# Patient Record
Sex: FEMALE | Race: WHITE
[De-identification: names, ages, dates, MRNs, and addresses within clinical notes are randomized per-mention and may not be internally consistent; named-entity substitution may affect disease eponyms.]

---

## 2018-04-12 ENCOUNTER — HOSPITAL ENCOUNTER (OUTPATIENT)
Age: 43
End: 2018-04-12
Payer: COMMERCIAL

## 2018-04-12 DIAGNOSIS — S93.491D: Primary | ICD-10-CM

## 2018-04-12 DIAGNOSIS — M25.371: ICD-10-CM

## 2018-04-12 PROCEDURE — 73721 MRI JNT OF LWR EXTRE W/O DYE: CPT

## 2018-05-21 ENCOUNTER — HOSPITAL ENCOUNTER (OUTPATIENT)
Age: 43
End: 2018-05-21
Payer: COMMERCIAL

## 2018-05-21 DIAGNOSIS — Z01.818: Primary | ICD-10-CM

## 2018-05-21 PROCEDURE — 93005 ELECTROCARDIOGRAM TRACING: CPT

## 2018-05-23 ENCOUNTER — HOSPITAL ENCOUNTER (OUTPATIENT)
Age: 43
End: 2018-05-23
Payer: COMMERCIAL

## 2018-05-23 DIAGNOSIS — Z01.818: Primary | ICD-10-CM

## 2018-05-23 LAB
ANION GAP: 3 (ref 5–15)
BUN SERPL-MCNC: 16 MG/DL (ref 7–18)
BUN/CREAT RATIO: 20.2 RATIO (ref 10–20)
CALCIUM SERPL-MCNC: 8.3 MG/DL (ref 8.5–10.1)
CARBON DIOXIDE: 30 MMOL/L (ref 21–32)
CHLORIDE: 106 MMOL/L (ref 98–107)
EST GLOM FILT RATE - AFR AMER: 103 ML/MIN (ref 60–?)
GLUCOSE: 85 MG/DL (ref 74–106)
POTASSIUM: 4 MMOL/L (ref 3.5–5.1)

## 2018-05-23 PROCEDURE — 80048 BASIC METABOLIC PNL TOTAL CA: CPT

## 2018-05-23 PROCEDURE — 36415 COLL VENOUS BLD VENIPUNCTURE: CPT

## 2018-09-20 ENCOUNTER — HOSPITAL ENCOUNTER (OUTPATIENT)
Age: 43
End: 2018-09-20
Payer: COMMERCIAL

## 2018-09-20 DIAGNOSIS — Z12.4: Primary | ICD-10-CM

## 2018-09-20 PROCEDURE — G0145 SCR C/V CYTO,THINLAYER,RESCR: HCPCS

## 2018-09-20 PROCEDURE — 88175 CYTOPATH C/V AUTO FLUID REDO: CPT

## 2018-09-24 ENCOUNTER — HOSPITAL ENCOUNTER (OUTPATIENT)
Age: 43
End: 2018-09-24
Payer: COMMERCIAL

## 2018-09-24 DIAGNOSIS — Z12.31: Primary | ICD-10-CM

## 2018-09-24 PROCEDURE — 77067 SCR MAMMO BI INCL CAD: CPT

## 2018-09-24 PROCEDURE — 77063 BREAST TOMOSYNTHESIS BI: CPT

## 2022-12-08 ENCOUNTER — HOSPITAL ENCOUNTER (OUTPATIENT)
Dept: HOSPITAL 100 - LABSPEC | Age: 47
Discharge: HOME | End: 2022-12-08
Payer: COMMERCIAL

## 2022-12-08 DIAGNOSIS — L05.01: Primary | ICD-10-CM

## 2022-12-08 PROCEDURE — 87070 CULTURE OTHR SPECIMN AEROBIC: CPT

## 2022-12-08 PROCEDURE — 87075 CULTR BACTERIA EXCEPT BLOOD: CPT

## 2022-12-08 PROCEDURE — 87205 SMEAR GRAM STAIN: CPT

## 2022-12-08 PROCEDURE — 87077 CULTURE AEROBIC IDENTIFY: CPT

## 2022-12-27 ENCOUNTER — HOSPITAL ENCOUNTER (OUTPATIENT)
Dept: HOSPITAL 100 - EN | Age: 47
Discharge: HOME | End: 2022-12-27
Payer: COMMERCIAL

## 2022-12-27 VITALS — BODY MASS INDEX: 30.3 KG/M2

## 2022-12-27 VITALS
SYSTOLIC BLOOD PRESSURE: 121 MMHG | OXYGEN SATURATION: 100 % | DIASTOLIC BLOOD PRESSURE: 76 MMHG | RESPIRATION RATE: 16 BRPM | HEART RATE: 67 BPM

## 2022-12-27 VITALS
HEART RATE: 71 BPM | DIASTOLIC BLOOD PRESSURE: 80 MMHG | RESPIRATION RATE: 16 BRPM | SYSTOLIC BLOOD PRESSURE: 121 MMHG | TEMPERATURE: 97.8 F | OXYGEN SATURATION: 100 %

## 2022-12-27 VITALS
DIASTOLIC BLOOD PRESSURE: 80 MMHG | OXYGEN SATURATION: 100 % | TEMPERATURE: 97.8 F | SYSTOLIC BLOOD PRESSURE: 121 MMHG | RESPIRATION RATE: 16 BRPM | HEART RATE: 62 BPM

## 2022-12-27 VITALS — SYSTOLIC BLOOD PRESSURE: 121 MMHG | DIASTOLIC BLOOD PRESSURE: 80 MMHG

## 2022-12-27 VITALS
RESPIRATION RATE: 16 BRPM | SYSTOLIC BLOOD PRESSURE: 104 MMHG | DIASTOLIC BLOOD PRESSURE: 67 MMHG | HEART RATE: 65 BPM | OXYGEN SATURATION: 99 %

## 2022-12-27 VITALS
DIASTOLIC BLOOD PRESSURE: 80 MMHG | RESPIRATION RATE: 16 BRPM | SYSTOLIC BLOOD PRESSURE: 121 MMHG | OXYGEN SATURATION: 100 % | TEMPERATURE: 98.3 F | HEART RATE: 69 BPM

## 2022-12-27 DIAGNOSIS — Z12.11: Primary | ICD-10-CM

## 2022-12-27 DIAGNOSIS — K63.89: ICD-10-CM

## 2022-12-27 LAB — INTERNAL QC VALIDATED?: (no result)

## 2022-12-27 PROCEDURE — 88305 TISSUE EXAM BY PATHOLOGIST: CPT

## 2022-12-27 PROCEDURE — 0DJD8ZZ INSPECTION OF LOWER INTESTINAL TRACT, VIA NATURAL OR ARTIFICIAL OPENING ENDOSCOPIC: ICD-10-PCS | Performed by: SURGERY

## 2022-12-27 PROCEDURE — 45380 COLONOSCOPY AND BIOPSY: CPT

## 2022-12-27 PROCEDURE — 81025 URINE PREGNANCY TEST: CPT

## 2022-12-27 RX ADMIN — SILVER NITRATE 1 EACH: 38.21; 12.74 STICK TOPICAL at 07:40

## 2023-01-10 ENCOUNTER — APPOINTMENT (OUTPATIENT)
Dept: URBAN - METROPOLITAN AREA CLINIC 204 | Age: 48
Setting detail: DERMATOLOGY
End: 2023-01-10

## 2023-01-10 DIAGNOSIS — L57.8 OTHER SKIN CHANGES DUE TO CHRONIC EXPOSURE TO NONIONIZING RADIATION: ICD-10-CM

## 2023-01-10 DIAGNOSIS — D18.0 HEMANGIOMA: ICD-10-CM

## 2023-01-10 DIAGNOSIS — D22 MELANOCYTIC NEVI: ICD-10-CM

## 2023-01-10 DIAGNOSIS — L21.8 OTHER SEBORRHEIC DERMATITIS: ICD-10-CM

## 2023-01-10 DIAGNOSIS — L82.1 OTHER SEBORRHEIC KERATOSIS: ICD-10-CM

## 2023-01-10 PROBLEM — D22.71 MELANOCYTIC NEVI OF RIGHT LOWER LIMB, INCLUDING HIP: Status: ACTIVE | Noted: 2023-01-10

## 2023-01-10 PROBLEM — D22.62 MELANOCYTIC NEVI OF LEFT UPPER LIMB, INCLUDING SHOULDER: Status: ACTIVE | Noted: 2023-01-10

## 2023-01-10 PROBLEM — D22.72 MELANOCYTIC NEVI OF LEFT LOWER LIMB, INCLUDING HIP: Status: ACTIVE | Noted: 2023-01-10

## 2023-01-10 PROBLEM — D18.01 HEMANGIOMA OF SKIN AND SUBCUTANEOUS TISSUE: Status: ACTIVE | Noted: 2023-01-10

## 2023-01-10 PROBLEM — D22.61 MELANOCYTIC NEVI OF RIGHT UPPER LIMB, INCLUDING SHOULDER: Status: ACTIVE | Noted: 2023-01-10

## 2023-01-10 PROBLEM — D22.39 MELANOCYTIC NEVI OF OTHER PARTS OF FACE: Status: ACTIVE | Noted: 2023-01-10

## 2023-01-10 PROBLEM — D22.5 MELANOCYTIC NEVI OF TRUNK: Status: ACTIVE | Noted: 2023-01-10

## 2023-01-10 PROCEDURE — 99203 OFFICE O/P NEW LOW 30 MIN: CPT

## 2023-01-10 PROCEDURE — OTHER PRESCRIPTION MEDICATION MANAGEMENT: OTHER

## 2023-01-10 PROCEDURE — OTHER PRESCRIPTION: OTHER

## 2023-01-10 PROCEDURE — OTHER COUNSELING: OTHER

## 2023-01-10 PROCEDURE — OTHER MIPS QUALITY: OTHER

## 2023-01-10 RX ORDER — KETOCONAZOLE 20 MG/ML
SHAMPOO, SUSPENSION TOPICAL TIW
Qty: 120 | Refills: 3 | Status: ERX | COMMUNITY
Start: 2023-01-10

## 2023-01-10 ASSESSMENT — LOCATION DETAILED DESCRIPTION DERM
LOCATION DETAILED: LEFT LATERAL EYEBROW
LOCATION DETAILED: RIGHT LATERAL EYEBROW
LOCATION DETAILED: INFERIOR THORACIC SPINE
LOCATION DETAILED: LEFT DISTAL POSTERIOR UPPER ARM
LOCATION DETAILED: SUPERIOR LUMBAR SPINE
LOCATION DETAILED: RIGHT MEDIAL FRONTAL SCALP
LOCATION DETAILED: EPIGASTRIC SKIN
LOCATION DETAILED: RIGHT DISTAL POSTERIOR UPPER ARM
LOCATION DETAILED: RIGHT ANTERIOR DISTAL THIGH
LOCATION DETAILED: LEFT RIB CAGE
LOCATION DETAILED: RIGHT LATERAL BREAST 7-8:00 REGION
LOCATION DETAILED: LEFT ANTERIOR PROXIMAL THIGH
LOCATION DETAILED: RIGHT SUPERIOR UPPER BACK

## 2023-01-10 ASSESSMENT — LOCATION SIMPLE DESCRIPTION DERM
LOCATION SIMPLE: LEFT EYEBROW
LOCATION SIMPLE: RIGHT THIGH
LOCATION SIMPLE: RIGHT UPPER BACK
LOCATION SIMPLE: RIGHT BREAST
LOCATION SIMPLE: RIGHT POSTERIOR UPPER ARM
LOCATION SIMPLE: UPPER BACK
LOCATION SIMPLE: LEFT THIGH
LOCATION SIMPLE: ABDOMEN
LOCATION SIMPLE: RIGHT SCALP
LOCATION SIMPLE: LEFT POSTERIOR UPPER ARM
LOCATION SIMPLE: LOWER BACK
LOCATION SIMPLE: RIGHT EYEBROW

## 2023-01-10 ASSESSMENT — LOCATION ZONE DERM
LOCATION ZONE: FACE
LOCATION ZONE: TRUNK
LOCATION ZONE: ARM
LOCATION ZONE: SCALP
LOCATION ZONE: LEG

## 2023-01-10 NOTE — PROCEDURE: PRESCRIPTION MEDICATION MANAGEMENT
Plan: Will f/u in 6 weeks. If doing well pt can cancel appt. Can taper down from there to prn
Render In Strict Bullet Format?: No
Detail Level: Zone
Initiate Treatment: ketoconazole 2 % shampoo TIW\\nQuantity: 120.0 ml  Days Supply: 30\\nSig: Shampoo 2-3 times weekly.

## 2023-01-10 NOTE — PROCEDURE: COUNSELING
Detail Level: Generalized
Detail Level: Simple
Detail Level: Zone
Detail Level: Detailed
Shampoo Recommendations: Alternate T/Gel with T/Sal to help soothe the scalp and gently lift the scale away.

## 2024-05-09 ENCOUNTER — HOSPITAL ENCOUNTER (OUTPATIENT)
Dept: HOSPITAL 100 - MFPLAB | Age: 49
Discharge: HOME | End: 2024-05-09
Payer: COMMERCIAL

## 2024-05-09 DIAGNOSIS — Z00.00: Primary | ICD-10-CM

## 2024-05-09 DIAGNOSIS — Z13.29: ICD-10-CM

## 2024-05-09 DIAGNOSIS — Z13.220: ICD-10-CM

## 2024-05-09 DIAGNOSIS — Z13.21: ICD-10-CM

## 2024-05-09 DIAGNOSIS — R53.83: ICD-10-CM

## 2024-05-09 DIAGNOSIS — Z13.1: ICD-10-CM

## 2024-05-09 LAB
ALANINE AMINOTRANSFER ALT/SGPT: 34 U/L (ref 13–56)
ALBUMIN SERPL-MCNC: 3.9 G/DL (ref 3.2–5)
ALKALINE PHOSPHATASE: 96 U/L (ref 45–117)
ANION GAP: 9 (ref 5–15)
AST(SGOT): 19 U/L (ref 15–37)
BUN SERPL-MCNC: 15 MG/DL (ref 7–18)
BUN/CREAT RATIO: 18.8 RATIO (ref 10–20)
CALCIUM SERPL-MCNC: 8.9 MG/DL (ref 8.5–10.1)
CARBON DIOXIDE: 23 MMOL/L (ref 21–32)
CHLORIDE: 107 MMOL/L (ref 98–107)
CHOLEST SERPL-MCNC: 271 MG/DL
CRP SERPL-MCNC: 5.66 MG/L (ref 0–3)
DEPRECATED RDW RBC: 41.2 FL (ref 35.1–43.9)
ERYTHROCYTE [DISTWIDTH] IN BLOOD: 11.9 % (ref 11.6–14.6)
EST GLOM FILT RATE - AFR AMER: 99 ML/MIN (ref 60–?)
FREE T3: 2.9 PG/ML (ref 2.18–3.98)
GLOBULIN: 3.5 G/DL (ref 2.2–4.2)
GLUCOSE: 87 MG/DL (ref 74–106)
HCT VFR BLD AUTO: 43.5 % (ref 37–47)
HGB BLD-MCNC: 14.5 G/DL (ref 12–15)
MCV RBC: 93.5 FL (ref 81–99)
MEAN CORP HGB CONC: 33.3 G/DL (ref 32–36)
MEAN PLATELET VOL.: 10.4 FL (ref 6.2–12)
PLATELET # BLD: 232 K/MM3 (ref 150–450)
POTASSIUM: 4.3 MMOL/L (ref 3.5–5.1)
RBC # BLD AUTO: 4.65 M/MM3 (ref 4.2–5.4)
TRIGLYCERIDES: 138 MG/DL
VITAMIN D,25 HYDROXY: 48.8 NG/ML
VLDLC SERPL-MCNC: 28 MG/DL (ref 5–40)
WBC # BLD AUTO: 4.3 K/MM3 (ref 4.4–11)

## 2024-05-09 PROCEDURE — 84439 ASSAY OF FREE THYROXINE: CPT

## 2024-05-09 PROCEDURE — 36415 COLL VENOUS BLD VENIPUNCTURE: CPT

## 2024-05-09 PROCEDURE — 80061 LIPID PANEL: CPT

## 2024-05-09 PROCEDURE — 86140 C-REACTIVE PROTEIN: CPT

## 2024-05-09 PROCEDURE — 80053 COMPREHEN METABOLIC PANEL: CPT

## 2024-05-09 PROCEDURE — 85027 COMPLETE CBC AUTOMATED: CPT

## 2024-05-09 PROCEDURE — 84481 FREE ASSAY (FT-3): CPT

## 2024-05-09 PROCEDURE — 83090 ASSAY OF HOMOCYSTEINE: CPT

## 2024-05-09 PROCEDURE — 82306 VITAMIN D 25 HYDROXY: CPT

## 2024-05-09 PROCEDURE — 83525 ASSAY OF INSULIN: CPT

## 2024-05-09 PROCEDURE — 84443 ASSAY THYROID STIM HORMONE: CPT

## 2024-05-11 LAB — HOMOCYSTEINE: 10.1 UMOL/L (ref 0–14.5)

## 2024-05-17 ENCOUNTER — HOSPITAL ENCOUNTER (OUTPATIENT)
Dept: HOSPITAL 100 - SDC | Age: 49
Discharge: HOME | End: 2024-05-17
Payer: COMMERCIAL

## 2024-05-17 VITALS
SYSTOLIC BLOOD PRESSURE: 111 MMHG | DIASTOLIC BLOOD PRESSURE: 78 MMHG | RESPIRATION RATE: 16 BRPM | HEART RATE: 68 BPM | TEMPERATURE: 97.1 F | OXYGEN SATURATION: 95 %

## 2024-05-17 VITALS
SYSTOLIC BLOOD PRESSURE: 122 MMHG | OXYGEN SATURATION: 99 % | RESPIRATION RATE: 16 BRPM | HEART RATE: 57 BPM | DIASTOLIC BLOOD PRESSURE: 77 MMHG | TEMPERATURE: 97.34 F

## 2024-05-17 VITALS
DIASTOLIC BLOOD PRESSURE: 78 MMHG | RESPIRATION RATE: 16 BRPM | HEART RATE: 78 BPM | OXYGEN SATURATION: 95 % | SYSTOLIC BLOOD PRESSURE: 123 MMHG

## 2024-05-17 VITALS
DIASTOLIC BLOOD PRESSURE: 78 MMHG | SYSTOLIC BLOOD PRESSURE: 108 MMHG | RESPIRATION RATE: 16 BRPM | OXYGEN SATURATION: 98 % | HEART RATE: 54 BPM

## 2024-05-17 VITALS
OXYGEN SATURATION: 97 % | HEART RATE: 49 BPM | DIASTOLIC BLOOD PRESSURE: 78 MMHG | RESPIRATION RATE: 16 BRPM | TEMPERATURE: 96.98 F | SYSTOLIC BLOOD PRESSURE: 123 MMHG

## 2024-05-17 VITALS
DIASTOLIC BLOOD PRESSURE: 78 MMHG | SYSTOLIC BLOOD PRESSURE: 123 MMHG | OXYGEN SATURATION: 100 % | TEMPERATURE: 97.6 F | RESPIRATION RATE: 18 BRPM | HEART RATE: 65 BPM

## 2024-05-17 VITALS — BODY MASS INDEX: 34 KG/M2

## 2024-05-17 DIAGNOSIS — K40.20: Primary | ICD-10-CM

## 2024-05-17 DIAGNOSIS — Z90.49: ICD-10-CM

## 2024-05-17 LAB
INTERNAL QC VALIDATED?: (no result)
RECORD KIT LOT#,URINE PREG: (no result)

## 2024-05-17 PROCEDURE — C1781 MESH (IMPLANTABLE): HCPCS

## 2024-05-17 PROCEDURE — 81025 URINE PREGNANCY TEST: CPT

## 2024-05-17 PROCEDURE — 49505 PRP I/HERN INIT REDUC >5 YR: CPT

## 2024-05-17 PROCEDURE — 00830 ANES HERNIA RPR LWR ABD NOS: CPT

## 2024-05-17 RX ADMIN — HYDROCODONE BITARTRATE AND ACETAMINOPHEN 0 TABLET: 5; 325 TABLET ORAL at 10:19

## 2024-05-17 RX ADMIN — CEFAZOLIN 150 GM: 10 INJECTION, POWDER, FOR SOLUTION INTRAVENOUS at 07:22

## 2024-08-19 ENCOUNTER — HOSPITAL ENCOUNTER (OUTPATIENT)
Dept: RADIOLOGY | Facility: HOSPITAL | Age: 49
Discharge: HOME | End: 2024-08-19
Payer: COMMERCIAL

## 2024-08-19 DIAGNOSIS — Z13.21 ENCOUNTER FOR SCREENING FOR NUTRITIONAL DISORDER: ICD-10-CM

## 2024-08-19 PROCEDURE — 75571 CT HRT W/O DYE W/CA TEST: CPT

## 2024-11-19 ENCOUNTER — HOSPITAL ENCOUNTER (OUTPATIENT)
Dept: HOSPITAL 100 - LAB | Age: 49
Discharge: HOME | End: 2024-11-19
Payer: COMMERCIAL

## 2024-11-19 DIAGNOSIS — E78.5: ICD-10-CM

## 2024-11-19 DIAGNOSIS — E55.9: ICD-10-CM

## 2024-11-19 DIAGNOSIS — E61.1: ICD-10-CM

## 2024-11-19 DIAGNOSIS — R63.5: Primary | ICD-10-CM

## 2024-11-19 DIAGNOSIS — M25.9: ICD-10-CM

## 2024-11-19 LAB
ALANINE AMINOTRANSFER ALT/SGPT: 37 U/L (ref 13–56)
ALBUMIN SERPL-MCNC: 3.7 G/DL (ref 3.2–5)
ALKALINE PHOSPHATASE: 127 U/L (ref 45–117)
ANION GAP: 7 (ref 5–15)
AST(SGOT): 15 U/L (ref 15–37)
BUN SERPL-MCNC: 18 MG/DL (ref 7–18)
BUN/CREAT RATIO: 22.3 RATIO (ref 10–20)
CALCIUM SERPL-MCNC: 8.9 MG/DL (ref 8.5–10.1)
CARBON DIOXIDE: 26 MMOL/L (ref 21–32)
CHLORIDE: 102 MMOL/L (ref 98–107)
CHOLEST SERPL-MCNC: 245 MG/DL
DEPRECATED RDW RBC: 40.4 FL (ref 35.1–43.9)
ERYTHROCYTE [DISTWIDTH] IN BLOOD: 12.3 % (ref 11.6–14.6)
EST GLOM FILT RATE - AFR AMER: 98 ML/MIN (ref 60–?)
FERRITIN SERPL-MCNC: 51 NG/ML (ref 8–252)
FOLATES, (FOLIC ACID): 7.3 NG/ML (ref 3.1–55.4)
GLOBULIN: 3.5 G/DL (ref 2.2–4.2)
GLUCOSE: 92 MG/DL (ref 74–106)
HCT VFR BLD AUTO: 42.7 % (ref 37–47)
HEMOGLOBIN: 14.6 G/DL (ref 12–15)
HGB BLD-MCNC: 14.6 G/DL (ref 12–15)
IRON BINDING CAPACITY,TOTAL: 381 UG/DL (ref 250–450)
IRON SATN MFR SERPL: 32.8 % (ref 15–55)
IRON SPEC-MCNT: 125 UG/DL (ref 50–170)
LIPASE: 24 U/L (ref 13–75)
MCV RBC: 91 FL (ref 81–99)
MEAN CORP HGB CONC: 34.2 G/DL (ref 32–36)
MEAN PLATELET VOL.: 9.5 FL (ref 6.2–12)
PLATELET # BLD: 271 K/MM3 (ref 150–450)
PLATELET COUNT: 271 K/MM3 (ref 150–450)
POTASSIUM: 4.5 MMOL/L (ref 3.5–5.1)
RBC # BLD AUTO: 4.69 M/MM3 (ref 4.2–5.4)
RBC DISTRIBUTION WIDTH CV: 12.3 % (ref 11.6–14.6)
RBC DISTRIBUTION WIDTH SD: 40.4 FL (ref 35.1–43.9)
TRIGLYCERIDES: 184 MG/DL
VITAMIN B12: 536 PG/ML (ref 211–911)
VITAMIN D,25 HYDROXY: 20.8 NG/ML
VLDLC SERPL-MCNC: 37 MG/DL (ref 5–40)
WBC # BLD AUTO: 6.7 K/MM3 (ref 4.4–11)
WHITE BLOOD COUNT: 6.7 K/MM3 (ref 4.4–11)

## 2024-11-19 PROCEDURE — 82728 ASSAY OF FERRITIN: CPT

## 2024-11-19 PROCEDURE — 83525 ASSAY OF INSULIN: CPT

## 2024-11-19 PROCEDURE — 84443 ASSAY THYROID STIM HORMONE: CPT

## 2024-11-19 PROCEDURE — 83690 ASSAY OF LIPASE: CPT

## 2024-11-19 PROCEDURE — 36415 COLL VENOUS BLD VENIPUNCTURE: CPT

## 2024-11-19 PROCEDURE — 82306 VITAMIN D 25 HYDROXY: CPT

## 2024-11-19 PROCEDURE — 80061 LIPID PANEL: CPT

## 2024-11-19 PROCEDURE — 85027 COMPLETE CBC AUTOMATED: CPT

## 2024-11-19 PROCEDURE — 80053 COMPREHEN METABOLIC PANEL: CPT

## 2024-11-19 PROCEDURE — 83540 ASSAY OF IRON: CPT

## 2024-11-19 PROCEDURE — 82746 ASSAY OF FOLIC ACID SERUM: CPT

## 2024-11-19 PROCEDURE — 82607 VITAMIN B-12: CPT

## 2024-11-19 PROCEDURE — 83036 HEMOGLOBIN GLYCOSYLATED A1C: CPT

## 2024-11-19 PROCEDURE — 83550 IRON BINDING TEST: CPT

## 2024-11-19 PROCEDURE — 84439 ASSAY OF FREE THYROXINE: CPT

## 2025-01-21 ENCOUNTER — HOSPITAL ENCOUNTER
Dept: HOSPITAL 101 - RAD | Age: 50
Discharge: HOME | End: 2025-01-21
Payer: SELF-PAY

## 2025-01-21 DIAGNOSIS — M77.32: ICD-10-CM

## 2025-01-21 DIAGNOSIS — M25.572: Primary | ICD-10-CM

## 2025-01-21 PROCEDURE — 73630 X-RAY EXAM OF FOOT: CPT

## 2025-01-21 PROCEDURE — 73610 X-RAY EXAM OF ANKLE: CPT

## 2025-01-21 NOTE — XR_ITS
[address] [address] 
     [phone] 
  
  
Patient Name: 
MAGGY CARPIO 
  
MRN: [id number]    YOB: 1975    Sex: F 
Assigned Patient Location: [facility] 
Current Patient Location:   
Accession/Order Number: [id number] 
Exam Date: 1/21/2025  15:38    Report Date: 1/22/2025  23:09 
  
At the request of: 
ANH MCLOED   
  
Procedure:  XR foot LT min 3V 
  
EXAM: XR foot LT min 3V  
  
HISTORY: Left Foot And Ankle Pain 
  
COMPARISON: None.  
  
FINDINGS/IMPRESSION:  
  
1. No acute fracture or dislocation. 
2. Mild degeneration of the interphalangeal joints. 
3. Small calcaneal Achilles and plantar enthesophytes. 
4. No ankle joint effusion. 
  
  
Electronically authenticated by: CALI CHOW   Date: 1/22/2025  23:09

## 2025-01-21 NOTE — XR_ITS
The [address] [address] 
     [phone] 
  
  
Patient Name: 
MAGGY CARPIO 
  
MRN: [id number]    YOB: 1975    Sex: F 
Assigned Patient Location: [facility] 
Current Patient Location:   
Accession/Order Number: [id number] 
Exam Date: 1/21/2025  15:38    Report Date: 1/22/2025  23:09 
  
At the request of: 
ANH MCLEOD   
  
Procedure:  XR ankle LT min 3V 
  
EXAM: XR ankle LT min 3V  
  
HISTORY: Left Foot And Ankle Pain 
  
COMPARISON: None.  
  
FINDINGS/IMPRESSION:  
  
1. No acute fracture or dislocation. 
2. Ankle mortise is maintained. 
3. Mild degeneration of the mid foot. 
4. No ankle joint effusion. 
5. Small calcaneal Achilles and plantar enthesophytes. 
  
  
Electronically authenticated by: CALI CHOW   Date: 1/22/2025  23:09

## 2025-02-03 ENCOUNTER — HOSPITAL ENCOUNTER
Dept: HOSPITAL 101 - PST | Age: 50
Discharge: HOME | End: 2025-02-03
Payer: COMMERCIAL

## 2025-02-03 VITALS
OXYGEN SATURATION: 96 % | TEMPERATURE: 97.1 F | SYSTOLIC BLOOD PRESSURE: 137 MMHG | HEART RATE: 76 BPM | DIASTOLIC BLOOD PRESSURE: 91 MMHG

## 2025-02-03 DIAGNOSIS — Z01.810: Primary | ICD-10-CM

## 2025-02-03 DIAGNOSIS — M72.2: ICD-10-CM

## 2025-02-03 DIAGNOSIS — Z01.818: ICD-10-CM

## 2025-02-03 PROCEDURE — G0463 HOSPITAL OUTPT CLINIC VISIT: HCPCS

## 2025-02-03 PROCEDURE — 93005 ELECTROCARDIOGRAM TRACING: CPT

## 2025-02-03 NOTE — XMS_ITS
Comprehensive CCD (C-CDA v2.1)  
  
                          Created on: February 3, 2025  
  
  
MAGGY CARPIO  
External Reference #: CDR,PersonID:[id number]  
: 1975  
Sex: Female  
  
Demographics  
  
  
                                        Address             [address]  [address]  
   
                                        Home Phone          [phone]  
   
                                        Work Phone          [phone]  
   
                                        Home Phone          [phone]  
   
                                        Work Phone          [phone]  
   
                                        Home Phone          [phone]  
   
                                        Preferred Language  en  
   
                                        Marital Status        
   
                                        Holiness Affiliation Unknown  
   
                                        Race                White  
   
                                        Ethnic Group        Not  or Lati  
no  
  
  
Author  
  
  
                                        Organization        [facility]  
  
  
Care Team Providers  
  
  
                                Care Team Member Name Role            Phone  
   
                                Jb Apodaca Unavailable     [phone]  
   
                                Rachelle Alexander N  Unavailable     [phone]  
   
                                Alexander, Rachelle N  Unavailable     [phone]  
   
                                Alexander Rachelle N  Unavailable     [phone]  
   
                                Alexander Rachelle N  Unavailable     [phone]  
   
                                Alexander Rachelle N  Unavailable     [phone]  
   
                                Alexander, Rachelle N  Unavailable     [phone]  
   
                                ANH MCLEOD Admitting       Unavailable  
   
                                ANH MCLEOD Attending       Unavailable  
   
                                DR SARWAT CARRILLO Consulting      Unavailable  
   
                                ANH MCLEOD Consulting      Unavailable  
   
                                Jose Sloan MD Primary Care Provider 1  
[phone]  
   
                                Jose Sloan MD Primary Care Provider 1  
[phone]  
   
                                Dr. Krystian Sloan Primary Care Provider [phone]  
[phone]  
   
                                Carolina Sunshine     Attending Provider Unavailable  
   
                                Dr. Krystian Sloan Referring Provider [phone]  
[phone]  
   
                                Dr. Faye Valero Attending Provider [phone]  
   
                                Yusra Maria Attending Provider Unavailable  
   
                                Dr. Faye Valero Other Provider  [phone]  
   
                                JOSE SLOAN MD Consulting      Unavailab  
le  
   
                                JANIE LAUREN-URGENT/OCC MED Primary Care    Unav  
ailable  
   
                                JANIE, LAUREN-URGENT/OCC MED Attending       Unav  
ailable  
   
                                JANIE LAUREN-URGENT/OCC MED Admitting       Unav  
ailable  
   
                                PROVIDER, UNKNOWN Consulting      Unavailable  
   
                                JOSE SLOAN MD Consulting      Unavailab  
SHANI Waldrop MD Primary Care    Unavailable  
   
                                SHANI SANCHEZ MD Attending       Unavailable  
   
                                SHANI SANCHEZ MD Admitting       Unavailable  
   
                                PROVIDER, UNKNOWN Consulting      Unavailable  
   
                                JOSE SLOAN MD Consulting      Unavailab  
le  
   
                                POMPEY, KAYLYN PAC Primary Care    Unavailable  
   
                                POMPEY, KAYLYN PAC Attending       Unavailable  
   
                                POMPEY, KAYLYN PAC Admitting       Unavailable  
   
                                PROVIDER, UNKNOWN Consulting      Unavailable  
   
                                JOSE SLOAN MD Consulting      Unavailab  
PRABHU Urbano DR Primary Care    Unavailable  
   
                                PRABHU SCHMIDT DR Attending       Unavailable  
   
                                PRABHU SCHMIDT DR Admitting       Unavailable  
   
                                PROVIDER, UNKNOWN Consulting      Unavailable  
   
                                JOSE SLOAN MD Consulting      Unavailab  
PRABHU [address] Primary Care    Unavailable  
   
                                PRABHU SCHMIDT DR Attending       Unavailable  
   
                                PRABHU SCHMIDT DR Admitting       Unavailable  
   
                                PROVIDER, UNKNOWN Consulting      Unavailable  
   
                                JOSE SLOAN MD Consulting      Unavailab  
PRABHU [address] Primary Care    Unavailable  
   
                                PRABHU SCHMIDT DR Attending       Unavailable  
   
                                PRABHU SCHMIDT DR Admitting       Unavailable  
   
                                PROVIDER, UNKNOWN Consulting      Unavailable  
   
                                JOSE SLOAN MD Consulting      Unavailab  
le  
   
                                POMPEY, KAYLYN PAC Primary Care    Unavailable  
   
                                POMPEY, KAYLYN PAC Attending       Unavailable  
   
                                POMPEY, KAYLYN PAC Admitting       Unavailable  
   
                                PROVIDER, UNKNOWN Consulting      Unavailable  
   
                                JOSE SLOAN MD Consulting      Unavailab  
SHANI Waldrop MD Primary Care    Unavailable  
   
                                SHANI SANCHEZ MD Attending       Unavailable  
   
                                SHANI SANCHEZ MD Admitting       Unavailable  
   
                                PROVIDER, UNKNOWN Consulting      Unavailable  
   
                                JOSE SLOAN MD Consulting      Unavailab  
SHANI Waldrop MD Primary Care    Unavailable  
   
                                JOSE SLOAN MD Referring       Unavailab  
SHANI Waldrop MD Attending       Unavailable  
   
                                SHANI SANCHEZ MD Admitting       Unavailable  
   
                                PROVIDER, UNKNOWN Consulting      Unavailable  
   
                                JOSE SLOAN MD Consulting      Unavailab  
SHANI Waldrop MD Primary Care    Unavailable  
   
                                SHANI SANCHEZ MD Attending       Unavailable  
   
                                SHANI SANCHEZ MD Admitting       Unavailable  
   
                                PROVIDER, UNKNOWN Consulting      Unavailable  
   
                                JOSE SLOAN MD Consulting      Unavailab  
SHANI Waldrop MD Primary Care    Unavailable  
   
                                SHANI SANCHEZ MD Attending       Unavailable  
   
                                SHANI SANCHEZ MD Admitting       Unavailable  
   
                                PROVIDER, UNKNOWN Consulting      Unavailable  
   
                                MARIYA Lopes Attending Provider [phone]  
[phone]  
   
                                Dr. Krystian Sloan Primary Care Provider [phone]  
[phone]  
   
                                Dr. Krystian Sloan Referring Provider [phone]  
   
                                Dr. Faye Valero Attending Provider [phone]  
   
                                Yusra Maria Attending Provider Unavailable  
   
                                Dr. Faye Valero Other Provider  [phone]  
[phone]  
   
                                MARIYA Lopes Attending Provider [phone]  
[phone]  
   
                                MARIYA Lopes Referring Provider [phone]  
[phone]  
   
                                Jose Sloan MD Primary Care Provider 1  
[phone]  
   
                                Dr. Krystian Sloan Primary Care Provider [phone]  
[phone]  
   
                                Dr. Krystian Sloan Referring Provider [phone]  
[phone]  
   
                                Dr. Faye Valero Attending Provider [phone]  
[phone]  
   
                                Dr. Krystian Sloan Primary Care Provider [phone]  
[phone]  
   
                                Dr. Krystian Sloan Referring Provider [phone]  
[phone]  
   
                                Dr. Faye Valero Attending Provider [phone]  
   
                                SHANI HOUSE Referring       Unavail  
able  
   
                                JOSE SLOAN Primary Care    Unavailabl  
e  
   
                                CEBUFAYE OMER   Referring       Unavailable  
   
                                JOSE SLOAN Primary Care    Unavailabl  
e  
   
                                CEBUFAYE OMER   Referring       Unavailable  
   
                                JOSE SLOAN Primary Care    Unavailabl  
e  
   
                                SHANI HOUSE Attending       Unavail  
able  
   
                                JOSE SLOAN Primary Care    Unavailabl  
e  
   
                                Dr. Jose Sloan Primary Care Provider [phone]  
[phone]  
   
                                Dr. Jose Sloan Referring Provider [phone]  
[phone]  
   
                                MARIYA Lopes Attending Provider [phone]  
[phone]  
   
                                RANNEY, CHRISTOPHER BEHR Referring       Unavail  
able  
   
                                RANNEY, CHRISTOPHER BEHR Primary Care    Unavail  
able  
   
                                Ranney MD, Christopher Behr Primary Care Provide  
r [phone]  
   
                                Faye Valero Attending       Unavailable  
   
                                Faye Valero Referring       Unavailable  
   
                                Jose Sloan Primary Care    Unavailable  
   
                                Jose Sloan Referring       Unavailable  
   
                                Luther, Jose Primary Care    Unavailable  
   
                                Doreen Lopes Attending       Unavailable  
   
                                Jose Sloan Referring       Unavailable  
   
                                Luther, Jose Primary Care    Unavailable  
   
                                Doreen Lopes Attending       Unavailable  
   
                                Jose Sloan Referring       Unavailable  
   
                                Luther, Jose Primary Care    Unavailable  
   
                                Faye Valero Attending       Unavailable  
   
                                Faye Valero Attending       Unavailable  
   
                                Jose Sloan Referring       Unavailable  
   
                                [facility] Primary [facility]    Unavailable  
   
                                [facility]    Unavailable  
   
                                Faye Valero Attending       Unavailable  
   
                                Jose Sloan Referring       Unavailable  
   
                                Jose Sloan Referring       Unavailable  
   
                                [facility]    Unavailable  
   
                                Faye Valero Attending       Unavailable  
   
                                SHEYLA BEAUCHAMP   Attending       Unavailable  
   
                                QUYNH JUNNA   Referring       Unavailable  
   
                                Jose Sloan Primary Care    Unavailable  
   
                                Jose Sloan Primary Care    Unavailable  
   
                                Jose Sloan Attending       Unavailable  
  
  
  
Allergies  
  
  
                                                    Allergy   
Classification                          Reported   
Allergen(s)               Allergy Type              Date of   
Onset                     Reaction(s)               Facility  
   
                                                      
(1 source)                              ALLERGIES NOT ON   
FILE;   
Translations:   
[ALLERGIES NOT   
ON FILE]                                Propensity to   
adverse   
reactions   
(disorder)                                                  [facility]   
Repository  
  
  
  
Medications  
Current Medications  
  
  
  
                      Medication Drug Class(es) Dates      Sig (Normalized) Sig   
(Original)  
   
                                                    B-Complex With   
Vitamin C  
(10 sources)                                        Start:   
2017                                          B-Complex With   
Vitamin C Active 1   
EACH PO DAILY May   
5th, 2017 11:02am  
  
  
  
                                                    Start: 2017  
End: 2024                                     B-Complex With Vitamin C Dis  
continued 1 EACH PO DAILY May 5th,   
2017 12:00am 2024 4:15pm  
   
                                                    Start: 2017  
End: 2024                                     B-Complex With Vitamin C Dis  
continued 1 EACH PO DAILY May 4th,   
2017 11:00pm 2024 3:15pm  
   
                                Start: 2017                 B-Complex With  
 Vitamin C Active 1 EACH PO DAILY May 5th, 2017  
  
12:00am  
   
                                Start: 2017                 B-Complex With  
 Vitamin C Active 1 EACH PO DAILY May 4th, 2017  
  
11:00pm  
  
  
  
                                                    cholecalciferol 0.125   
mg oral capsule  
(20 sources)        Vitamin D           Start: 2022   take 125 ug   
by mouth   
once daily                              Cholecalciferol   
(Vitamin D3) Active   
125 MCG PO DAILY   
2022   
1:00am  
  
  
  
                                                take 1 tablet by mouth once tomy  
y cholecalciferol (VITAMIN D-3) 5,000 unit tab   
Take   
5,000 Units by mouth once daily. 0 Active  
  
  
  
                                        Comment on above:   Take 5,000 Units by   
mouth once daily.   
   
                                                    docusate sodium 100   
mg oral capsule  
(3 sources)                                         Start:   
2017                              take 1 capsule by   
mouth twice daily   
as needed                               Docusate Sodium   
(Colace) 100 MG   
capsule Active 100   
MG PO TWICE DAILY   
AS NEEDED 10 May   
8th, 2017 11:00pm  
   
                                                    magnesium glycinate   
100 mg oral tablet  
(3 sources)                                         Start:   
2024                              take 100 mg by   
mouth once daily                        Magnesium Glycinate   
Active 100 MG PO   
DAILY 2024 1:00am  
   
                                                    melatonin 5 mg /   
vitamin b6 1 mg oral   
tablet  
(3 sources)                                         Start:   
2017                                          Melatonin-Pyridoxin  
e (Vit B6)   
(Melatonin 5 Mg   
Tablet) 1 EACH   
tablet Active 1   
EACH PO AS NEEDED   
May 4th, 2017   
11:00pm  
   
                                                    Mthf  
(10 sources)                                        Start:   
2017                              take 5 mg by mouth   
once daily                              Mthf Active 5 MG PO   
DAILY May 5th, 2017   
11:02am  
  
  
  
                                                    Start: 2017  
End: 2022           take 5 mg by mouth once daily Mthf Discontinued 5 MG P  
O DAILY   
May   
5th, 2017 12:00am 2022   
9:57am  
   
                                                    Start: 2017  
End: 2022           take 5 mg by mouth once daily Mthf Discontinued 5 MG P  
O DAILY   
May   
4th, 2017 11:00pm 2022   
8:57am  
  
  
  
                                                    Omega-3 Fatty Acids-Fish Oil  
   
(Fish Oil 1,000 Mg Capsule) 1   
EACH capsule  
(10 sources)                    Start: 2017                 Omega-3 Fatty   
Acids-Fish Oil   
(Fish Oil 1,000 Mg Capsule) 1   
EACH capsule Active 4 EACH PO   
DAILY May 5th, 2017 11:02am  
  
  
  
                                                    Start: 2017  
End: 2024                                     Omega-3 Fatty Acids-Fish Oil  
 (Fish Oil 1,000 Mg Capsule) 1 EACH  
  
capsule Discontinued 2 EACH PO DAILY May 5th, 2017 12:00am   
2024 4:15pm  
   
                                                    Start: 2017  
End: 2024                                     Omega-3 Fatty Acids-Fish Oil  
 (Fish Oil 1,000 Mg Capsule) 1 EACH  
  
capsule Discontinued 2 EACH PO DAILY May 4th, 2017 11:00pm   
2024 3:15pm  
   
                                Start: 2017                 Omega-3 Fatty   
Acids-Fish Oil (Fish Oil 1,000 Mg Capsule) 1   
EACH   
capsule Active 2 EACH PO DAILY May 5th, 2017 12:00am  
   
                                Start: 2017                 Omega-3 Fatty   
Acids-Fish Oil (Fish Oil 1,000 Mg Capsule) 1   
EACH   
capsule Active 2 EACH PO DAILY May 4th, 2017 11:00pm  
   
                                Start: 2017                 Omega-3 Fatty   
Acids-Fish Oil (Fish Oil 1,000 Mg Capsule) 1   
EACH   
capsule Active 4 EACH PO DAILY May 4th, 2017 11:00pm  
  
  
  
                                                    promethazine   
hydrochloride 25 mg   
oral tablet  
(3 sources)               Phenothiazine             Start:   
2017                              take 25 mg   
by mouth   
every four   
hours as   
needed                                  Promethazine   
Active 25 MG PO   
EVERY 4 HOURS AS   
NEEDED 10 May 8th,   
2017 11:00pm  
   
                                                    rizatriptan 5 mg   
oral tablet  
(20 sources)                            Serotonin-1b and   
Serotonin-1d   
Receptor Agonist                        Start:   
2017                                          Rizatriptan   
(Maxalt) 5 MG   
tablet Active 5 MG   
PO AS NEEDED May   
4th, 2017 11:00pm  
  
  
  
                                                            take 1 tablet by bert  
th every two hours   
as needed                               rizatriptan (MAXALT MLT) 5 mg disintegra  
ting   
tablet Take 5 mg by mouth as needed. May   
repeat in 2 hours if needed 0 Active  
   
                                                                MAXALT 10 MG TAB  
S as needed for headaches   
RIZATRIPTAN BENZOATE [id number] Josefina Narayanan LPN  
  
  
  
                                        Comment on above:   Take 5 mg by mouth a  
s needed. May repeat in 2 hours if needed   
  
  
  
Completed/Discontinued Medications  
  
  
  
                      Medication Drug Class(es) Dates      Sig (Normalized) Sig   
(Original)  
   
                                                    acetaminophen 325   
mg / HYDROcodone   
bitartrate 5 mg   
oral tablet  
(10 sources)              Opioid Agonist            Start:   
2017  
End:   
2021                              take 1 tablet by   
mouth every six   
hours as needed                         Hydrocodone-Acetam  
inophen   
Discontinued 1 - 2   
TABLET PO EVERY 6   
HOURS AS NEEDED 60   
May 9th, 2017   
12:00am 2021 12:39pm  
   
                                                    amoxicillin 500 mg   
oral tablet  
(13 sources)                            Penicillin-class   
Antibacterial                           Start:   
2023  
End:   
2023                              take 1000 mg by   
mouth twice daily                       Amoxicillin   
Discontinued 1000   
MG PO TWICE A DAY   
 1:00am 2023 3:46pm  
  
  
  
                                                    Start: 2022  
End: 2022                         take 1000 mg by mouth twice   
daily                                   Amoxicillin Discontinued 1000 MG PO   
TWICE A DAY    
1:00am 2022 1:04am  
  
  
  
                                                    amoxicillin 875   
mg / clavulanate   
125 mg oral   
tablet  
(9 sources)                             Penicillin-class   
Antibacterial                           Start:   
2022  
End: 2022                         take 1   
tablet by   
mouth twice   
daily                                   Amoxicillin-Pot   
Clavulanate   
Discontinued 1   
TABLET PO TWICE A   
DAY 10 5 2022 1:00am   
2022   
1:03am  
   
                                                    ascorbic acid 500   
mg oral tablet  
(13 sources)    Vitamin C                                       ascorbic acid,   
vitamin C, (VITAMIN   
C) 500 mg tablet   
Take 600 mg by mouth   
three times daily. 0   
Active  
   
                                        Comment on above:   Take 600 mg by mouth  
 three times daily.   
   
                                                    Ashwagandha Root   
Extract  
(9 sources)                                         Start:   
2022  
End: 2024                         take 300 mg   
by mouth   
once daily                              Ashwagandha Root   
Extract Discontinued   
300 MG PO DAILY   
2022   
1:00am 2024 4:14pm  
  
  
  
                                                    Start: 2022  
End: 2024                         take 300 mg by mouth once   
daily                                   Ashwagandha Root Extract Discontinued   
300 MG PO DAILY 2022   
12:00am 2024 3:14pm  
   
                                        Start: 2022   take 300 mg by mouth  
 once   
daily                                   Ashwagandha Root Extract Active 300   
MG PO DAILY 2022   
1:00am  
   
                                        Start: 2022   take 300 mg by mouth  
 once   
daily                                   Ashwagandha Root Extract Active 300   
MG PO DAILY 2022   
12:00am  
   
                                Start: 2022                 Ashwagandha Ro  
ot Extract Active MG PO   
2022 12:00am  
  
  
  
                                                    ASHWAGANDHA ROOT   
EXTRACT ORAL  
(13 sources)                                                    ASHWAGANDHA ROOT  
   
EXTRACT ORAL  
   
                                                    calcium ascorbate   
500 mg oral tablet  
(9 sources)                                         Start:   
  
End:   
                                       take 500 mg by   
mouth once   
daily                                   Ascorbate Calcium   
(Vitamin C)   
Discontinued 500 MG   
PO DAILY 2022 1:00am   
2024   
4:15pm  
   
                                                    cephalexin 500 mg   
oral tablet  
(11 sources)                            Cephalosporin   
Antibacterial                           Start:   
  
5                                       take 1 tablet   
by mouth three   
times daily                             KEFLEX 500 MG CAPS   
One tablet by mouth   
three times daily   
   
CEPHALEXIN   
[id number] Crow Godfrey MD  
  
  
  
                                        Start: 2015   take 1 tablet by bert  
 three   
times daily                             KEFLEX 500 MG CAPS One tablet by   
mouth three times daily    
CEPHALEXIN [id number] Crow Godfrey MD  
  
  
  
                                                    desogestrel /   
ethinyl estradiol  
(20 sources)                            Progestin,   
Estrogen                                Start:   
2012                              take 1   
tablet by   
mouth once   
daily                                   MIRCETTE TABS One tablet   
by mouth daily   
   
DESOGESTREL-ETHINYL   
ESTRADIOL TABS   
[id number] Irsi Goncalves  
  
  
  
                                                    Start: 2012  
End: 2015                         take 1 tablet by mouth once   
daily                                   MIRCETTE TABS One tablet by mouth daily   
 DESOGESTREL-ETHINYL ESTRADIOL   
TABS [id number] Josefina AAMIR Dcon LPN  
  
  
  
                                                    fish oil  
(20 sources)                            Start: 2012   take 1 tablet by bert  
th   
once daily                              FISH OIL CAPS One tablet by   
mouth daily    
OMEGA-3 FATTY ACIDS CAPS   
[id number] Iris Goncalves  
  
  
  
                                                    Start: 2012  
End: 2015                         take 1 tablet by mouth once   
daily                                   FISH OIL CAPS One tablet by mouth   
daily  OMEGA-3 FATTY ACIDS   
CAPS [id number] Josefina AAMIR Narayanan LPN  
  
  
  
                                                    magnesium oxide 400 mg   
oral capsule  
(20 sources)                                        Start: 2022  
End: 2024                         take 400 mg by   
mouth once daily                        Magnesium Oxide   
Discontinued 400 MG PO   
DAILY 2022   
1:00am 2024 4:15pm  
  
  
  
                                                                Magnesium Oxide   
250 mg magnesium tab Take 240 mg by mouth. 0 Active  
  
  
  
                                        Comment on above:   Take 240 mg by mouth  
.   
   
                                                    mecobalamin  
(13 sources)                                                    mecobalamin (B12  
   
ACTIVE ORAL)  
   
                                                    MEDICATION,   
NON-DATABASE  
(13 sources)                                                    MEDICATION,   
NON-DATABASE once each   
week. Allergy   
Injections 0 Active  
   
                                        Comment on above:   once each week. Javad  
rgy Injections   
   
                                                    MULTIPLE VITAMIN  
(10 sources)                                        Start:   
20  
12                                      take 1 tablet   
by mouth once   
daily                                   MULTIVITAMINS TABS One   
tablet by mouth daily   
 MULTIPLE   
VITAMIN [id number]   
Iris Goncalves  
   
                                                    MULTIPLE VITAMIN  
(1 source)                                          Start:   
20  
12                                      take 1 tablet   
by mouth once   
daily                                   MULTIVITAMINS TABS One   
tablet by mouth daily   
 MULTIPLE   
VITAMIN [id number]   
Iris Goncalves  
   
                                                    mupirocin 0.02 mg/mg   
topical ointment  
(10 sources)                            RNA Synthetase   
Inhibitor   
Antibacterial                           Start:   
20  
18  
End:   
20  
24                                                  Mupirocin Discontinued   
1 APPLIC TOPICAL   
.QDAILY 2 2018 12:00am 2024 4:15pm  
   
                                                    Ubidecar/Fish   
Oil/Omega-3/Bryon   
(OMEGA-3-DHA-EPA-FIS  
H OIL-COQ10 ORAL)  
(13 sources)                                                    Ubidecar/Fish   
Oil/Omega-3/Bryon   
(OMEGA-3-DHA-EPA-FISH   
OIL-COQ10 ORAL) Take   
by mouth. 0 Active  
   
                                        Comment on above:   Take by mouth.   
   
                                                    Vitamin B Complex  
(13 sources)                                                    vitamin B comple  
x (B   
COMPLEX 1 ORAL)  
   
                                                    vitamin b12 0.5 mg   
oral tablet  
(10 sources)              Vitamin B12               Start:   
20  
17  
End:   
20  
24                                                  Cyanocobalamin   
(Vitamin B-12)   
(Vitamin B-12) 500 MCG   
tablet Discontinued   
800 MCG PO DAILY May   
5th, 2017 12:00am   
2024   
4:15pm  
   
                                                    zinc carnosine   
(ZINLORI) 75 mg   
tablet  
(13 sources)                                                    zinc carnosine   
(ZINLORI) 75 mg tablet  
   
                                                    zinc gluconate 30 mg   
oral tablet  
(9 sources)                                         Start:   
20  
End:   
20  
24                                      take 30 mg by   
mouth once   
daily                                   Zinc Gluconate   
Discontinued 30 MG PO   
DAILY 2022 1:00am 2024 4:15pm  
   
                                                    Zinc Sulfate  
(13 sources)                                                take 16 mg by   
mouth three   
times daily                             zinc sulfate (ZINC-15   
ORAL) Take 16 mg by   
mouth three times   
daily. 0 Active  
   
                                        Comment on above:   Take 16 mg by mouth   
three times daily.   
   
                                                    ZOLMitriptan  
(13 sources)                            Serotonin-1b and   
Serotonin-1d Receptor   
Agonist                                                     zolmitriptan (ZOMIG   
ORAL) Take by mouth. 0   
Active  
   
                                        Comment on above:   Take by mouth.   
  
  
  
Problems  
Active Problems  
  
  
                      Problem Classification Problem    Date       Documented Da  
te Episodic/Chronic  
   
                                                    Abdominal pain  
(17 sources)                            Abdominal pain;   
Translations:   
[Unspecified   
abdominal pain]                         Onset:   
2013                Episodic  
   
                                                    Other circulatory   
disease  
(13 sources)                            Acrocyanosis;   
Translations:   
[Other specified   
peripheral vascular   
diseases]                               Onset:   
2010                Chronic  
   
                                                    Other circulatory   
disease  
(13 sources)                            Raynaud's disease;   
Translations:   
[Raynaud's syndrome   
without gangrene]                       Onset:   
2016                Chronic  
   
                                                    Other connective tissue   
disease  
(4 sources)                             Pain in right foot;   
Translations: [PAIN   
IN RIGHT FOOT]                          Onset:   
2022                                          Episodic  
   
                                                    Other female genital   
disorders  
(3 sources)                             Abnormal uterine   
bleeding;   
Translations:   
[Abnormal uterine   
and vaginal   
bleeding,   
unspecified]                                                Chronic  
   
                                                    Other female genital   
disorders  
(13 sources)                            Premenstrual   
tension syndrome;   
Translations:   
[Premenstrual   
tension syndrome]                       Onset:   
2016                Chronic  
   
                                                    Other gastrointestinal   
disorders  
(13 sources)                            Irritable bowel   
syndrome with   
diarrhea;   
Translations:   
[Irritable bowel   
syndrome with   
diarrhea]                               Onset:   
2016                Chronic  
   
                                                    Other non-traumatic   
joint disorders  
(1 source)                              Pain in right ankle   
and joints of right   
foot; Translations:   
[PAIN IN RIGHT   
ANKLE]                                  Onset:   
2022                                          Episodic  
   
                                                    Other nutritional;   
endocrine; and   
metabolic disorders  
(13 sources)                            Intolerance to   
lactose;   
Translations:   
[Lactose   
intolerance,   
unspecified]                            2013          Chronic  
   
                                                    Other nutritional;   
endocrine; and   
metabolic disorders  
(13 sources)                            Iron overload;   
Translations:   
[Other disorders of   
iron metabolism]                        Onset:   
2016                Chronic  
   
                                                    Other nutritional;   
endocrine; and   
metabolic disorders  
(1 source)                              Abnormal weight   
gain; Translations:   
[Abnormal weight   
gain]                                   Onset:   
2024                                          Episodic  
   
                                                    Other screening for   
suspected conditions   
(not mental disorders   
or infectious disease)  
(20 sources)                            Cancer cervix   
screening status;   
Translations:   
[Encounter for   
screening for   
malignant neoplasm   
of cervix]                              Onset:   
10-                                          Episodic  
   
                                                    Other upper respiratory   
disease  
(13 sources)                            Allergic   
disposition;   
Translations:   
[Other allergic   
rhinitis]                               Onset:   
2016                Chronic  
   
                                                    Unclassified  
(7 sources)                             Aftercare ;   
Translations:   
[Encounter for   
other orthopedic   
aftercare]                              Onset:   
2017                  
   
                                                    Viral infection  
(9 sources)                             Enteroviral   
vesicular   
stomatitis with   
exanthem;   
Translations:   
[Enteroviral   
vesicular   
stomatitis with   
exanthem]                               2022          Episodic  
  
  
Past or Other Problems  
  
  
                                                    Problem   
Classification      Problem             Date                Documented   
Date                                    Episodic/Chronic  
   
                                                    Abdominal hernia  
(8 sources)                             Left inguinal hernia ;   
Translations: [Unilateral   
inguinal hernia, without   
obstruction or gangrene, not   
specified as recurrent]                 Onset:   
  
024                       2024                Episodic  
   
                                                    Immunizations and   
screening for   
infectious disease  
(20 sources)                            Patient encounter status;   
Translations: [Encounter for   
screening for human   
papillomavirus (HPV)]                                         Episodic  
   
                                                    Joint disorders and   
dislocations;   
trauma-related  
(20 sources)                            Acute meniscal tear, medial;   
Translations: [Other tear of   
medial meniscus, current   
injury, left knee, initial   
encounter]                              Onset:   
  
017                       2017                Episodic  
   
                                                    Malaise and fatigue  
(13 sources)                            Fatigue; Translations:   
[Other fatigue]                         Onset:   
  
016                       2016                Episodic  
   
                                                    Neoplasms of   
unspecified nature   
or uncertain   
behavior  
(20 sources)                            Neoplasm of unspecified   
behavior of bone, soft   
tissue, and skin;   
Translations: [Neoplasm of   
unspecified behavior of   
bone, soft tissue, and skin]            Onset:   
  
015                       2015                Episodic  
   
                                                    Nutritional   
deficiencies  
(20 sources)                            Folic acid deficiency;   
Translations: [Deficiency of   
other specified B group   
vitamins]                               Onset:   
  
016                       2016                Episodic  
   
                                                    Other aftercare  
(20 sources)                            Encounter for other   
specified surgical   
aftercare; Translations:   
[Other tear of medial   
meniscus, current injury,   
left knee, subsequent   
encounter]                              Onset:   
  
015                       2015                Episodic  
   
                                                    Other connective   
tissue disease  
(20 sources)                            Pain in calf; Translations:   
[Patellar tendonitis]                   Onset:   
  
017                       2017                Episodic  
   
                                                    Other connective   
tissue disease  
(1 source)                              Patellar tendonitis;   
Translations: [Patellar   
tendinitis, unspecified   
knee]                                   2014          Episodic  
   
                                                    Other connective   
tissue disease  
(1 source)                              Iliotibial band friction   
syndrome; Translations:   
[Iliotibial band syndrome,   
left leg]                               2012          Episodic  
   
                                                    Other   
gastrointestinal   
disorders  
(13 sources)                            Abdominal bloating;   
Translations: [Abdominal   
distension (gaseous)]                   Onset:   
  
016                       2016                Episodic  
   
                                                    Other   
gastrointestinal   
disorders  
(13 sources)                            Small bowel bacterial   
overgrowth syndrome;   
Translations: [Other   
specified diseases of   
intestine]                              Onset:   
  
016                       2016                Episodic  
   
                                                    Other non-traumatic   
joint disorders  
(20 sources)                            Pain in left knee;   
Translations:   
[Patellofemoral stress   
syndrome]                               Onset:   
  
017                       2017                Episodic  
   
                                                    Other non-traumatic   
joint disorders  
(1 source)                              Effusion, left knee;   
Translations: [Effusion,   
left knee]                              Onset:   
  
017                       2017                Episodic  
   
                                                    Other skin disorders  
(13 sources)                            Skin lesion; Translations:   
[Disorder of the skin and   
subcutaneous tissue,   
unspecified]                            Onset:   
  
014                       2014                Episodic  
   
                                                    Residual codes;   
unclassified  
(13 sources)                            Heterozygous   
methylenetetrahydrofolate   
reductase mutation;   
Translations: [Genetic   
susceptibility to other   
disease]                                Onset:   
  
016                       2016                Episodic  
   
                                                    Skin and   
subcutaneous tissue   
infections  
(20 sources)                            Pilonidal abscess of    
cleft; Translations:   
[Pilonidal cyst with   
abscess]                                Onset:   
  
024                                                 Episodic  
  
  
  
Results  
  
  
                          Test Name    Value        Interpretation Reference   
Range                                   Facility  
   
                                                    Insulin Levelon 2024   
   
                      INSULIN,FASTING 12.4 uIU/mL Normal     2.6-24.9   [facility]  
   
                                        Comment on above:   Result Comment: Perf  
ormed at: [facility] [place] [id number]  
: Jerry Bobby PhD, Phone: [phone]   
   
                                                            Performed By: #### L  
[id number] ####  
[facility]  
[address] [place], [address] [phone]   
   
                                                    CBC-Complete Blood Cnt No Di  
ffon 2024   
   
                                                    Erythrocyte   
distribution width   
(RBC) [Ratio]   12.3 %          Normal          11.6-14.6       [facility]  
   
                                        Comment on above:   Performed By: #### L  
500.4050, L503.6030, L500.4100,   
.3500,   
L503.0105, L506.0400, L503.6550, L501.9985, L506.1000, L506.0250,   
L100.0500, L501.2450, L501.9520 ####  
[facility]  
[address] [place], [address] [phone]   
   
                                                    Hematocrit (Bld)   
[Volume fraction] 42.7 %          Normal          37-47           [facility]  
   
                                        Comment on above:   Performed By: #### L  
500.4050, L503.6030, L500.4100,   
.3500,   
L503.0105, L506.0400, L503.6550, L501.9985, L506.1000, L506.0250,   
L100.0500, L501.2450, L501.9520 ####  
[facility]  
[address] [place], [address]  
[phone]   
   
                                                    Hemoglobin (Bld)   
[Mass/Vol]      14.6 g/dL       Normal          12.0-15.0       [facility]  
   
                                        Comment on above:   Performed By: #### L  
500.4050, L503.6030, L500.4100,   
.3500,   
L503.0105, L506.0400, L503.6550, L501.9985, L506.1000, L506.0250,   
L100.0500, L501.2450, L501.9520 ####  
[facility]  
[address] [place], [address]  
[phone]   
   
                                                    MCH (RBC) [Entitic   
mass]           31.1 pg         Normal          27.0-32.0       [facility]  
   
                                        Comment on above:   Performed By: #### L  
500.4050, L503.6030, L500.4100,   
.3500,   
L503.0105, L506.0400, L503.6550, L501.9985, L506.1000, L506.0250,   
L100.0500, L501.2450, L501.9520 ####  
[facility]  
[address] [place], [address]  
[phone]   
   
                                                    MCHC (RBC)   
[Mass/Vol]      34.2 g/dL       Normal          32-36           [facility]  
   
                                        Comment on above:   Performed By: #### L  
500.4050, L503.6030, L500.4100,   
.3500,   
L503.0105, L506.0400, L503.6550, L501.9985, L506.1000, L506.0250,   
L100.0500, L501.2450, L501.9520 ####  
[facility]  
[address] [place], [address]  
[phone]   
   
                                                    MCV (RBC) [Entitic   
vol]            91.0 fL         Normal          81-99           [facility]  
   
                                        Comment on above:   Performed By: #### L  
500.4050, L503.6030, L500.4100,   
.3500,   
L503.0105, L506.0400, L503.6550, L501.9985, L506.1000, L506.0250,   
L100.0500, L501.2450, L501.9520 ####  
[facility]  
[address] [place], [address]  
[phone]   
   
                                                    Platelet mean   
volume (Bld)   
[Entitic vol]   9.5 fL          Normal          6.2-12.0        [facility]  
   
                                        Comment on above:   Performed By: #### L  
500.4050, L503.6030, L500.4100,   
.3500,   
L503.0105, L506.0400, L503.6550, L501.9985, L506.1000, L506.0250,   
L100.0500, L501.2450, L501.9520 ####  
[facility]  
[address] [place], [address]  
[phone]   
   
                                                    Platelets (Bld)   
[#/Vol]         271 10*3/uL     Normal          150-450         [facility]  
   
                                        Comment on above:   Performed By: #### L  
500.4050, L503.6030, L500.4100,   
.3500,   
L503.0105, L506.0400, L503.6550, L501.9985, L506.1000, L506.0250,   
L100.0500, L501.2450, L501.9520 ####  
[facility]  
[address] [place], [address]  
[phone]   
   
                      RBC (Bld) [#/Vol] 4.69 10*6/uL Normal     4.2-5.4    [facility]  
   
                                        Comment on above:   Performed By: #### L  
500.4050, L503.6030, L500.4100,   
.3500,   
L503.0105, L506.0400, L503.6550, L501.9985, L506.1000, L506.0250,   
L100.0500, L501.2450, L501.9520 ####  
[facility]  
[address] [place], [place], [address] [phone]   
   
                      RDW SD     40.4 fl    Normal     35.1-43.9  [facility]  
   
                                        Comment on above:   Performed By: #### L  
500.4050, L503.6030, L500.4100,   
.3500,   
L503.0105, L506.0400, L503.6550, L501.9985, L506.1000, L506.0250,   
L100.0500, L501.2450, L501.9520 ####  
[facility]  
[address] [place], [place], [address] [phone]   
   
                      WBC (Bld) [#/Vol] 6.7 10*3/uL Normal     4.4-11.0   [facility]  
   
                                        Comment on above:   Performed By: #### L  
500.4050, L503.6030, L500.4100,   
.3500,   
L503.0105, L506.0400, L503.6550, L501.9985, L506.1000, L506.0250,   
L100.0500, L501.2450, L501.9520 ####  
[facility]  
[address] [place], [place], [address] [phone]   
   
                                                    Comprehensive Metabolic Prof  
[facility] 2024   
   
                      Albumin [Mass/Vol] 3.7 g/dL   Normal     3.2-5.0    [facility]  
   
                                        Comment on above:   Order Comment: UNK  
N   
   
                                                            Performed By: #### L  
500.4050, L503.6030, L500.4100, .3500,   
L503.0105, L506.0400, L503.6550, L501.9985, L506.1000, L506.0250,   
L100.0500, L501.2450, L501.9520 ####  
[facility]  
[address] [place], [place], [address] [phone]   
   
                                                    Albumin/Globulin   
[Mass ratio]    1.1 {ratio}     Normal          0.9-2.4         [facility]  
   
                                        Comment on above:   Order Comment: UNK  
N   
   
                                                            Performed By: #### L  
500.4050, L503.6030, L500.4100, .3500,   
L503.0105, L506.0400, L503.6550, L501.9985, L506.1000, L506.0250,   
L100.0500, L501.2450, L501.9520 ####  
[facility]  
[address] [place], [address]  
[phone]   
   
                      ALK P      127 U/L    High            [facility]  
   
                                        Comment on above:   Order Comment: UNK  
N   
   
                                                            Performed By: #### L  
500.4050, L503.6030, L500.4100, .3500,   
L503.0105, L506.0400, L503.6550, L501.9985, L506.1000, L506.0250,   
L100.0500, L501.2450, L501.9520 ####  
[facility]  
[address] [place], [place], [address] [phone]   
   
                                                    ALT [Catalytic   
activity/Vol]   37 U/L          Normal          13-56           [facility]  
   
                                        Comment on above:   Order Comment: UNK  
N   
   
                                                            Performed By: #### L  
500.4050, L503.6030, L500.4100, .3500,   
L503.0105, L506.0400, L503.6550, L501.9985, L506.1000, L506.0250,   
L100.0500, L501.2450, L501.9520 ####  
[facility]  
[address] [place], [address]  
[phone]   
   
                                                    AST [Catalytic   
activity/Vol]   15 U/L          Normal          15-37           [facility]  
   
                                        Comment on above:   Order Comment: UNK  
N   
   
                                                            Performed By: #### L  
500.4050, L503.6030, L500.4100, .3500,   
L503.0105, L506.0400, L503.6550, L501.9985, L506.1000, L506.0250,   
L100.0500, L501.2450, L501.9520 ####  
[facility]  
[address] [place], [place], [address] [phone]   
   
                                                    Bilirubin   
[Mass/Vol]      0.40 mg/dL      Normal          0.20-1.00       [facility]  
   
                                        Comment on above:   Order Comment: UNK  
N   
   
                                                            Result Comment: For   
patients on eltrombopag therapy, use of  
[employer] [place] TBIL is not recommended.   
   
                                                            Performed By: #### L  
500.4050, L503.6030, L500.4100, .3500,   
L503.0105, L506.0400, L503.6550, L501.9985, L506.1000, L506.0250,   
L100.0500, L501.2450, L501.9520 ####  
[facility]  
[address] [place], [address] [phone]   
   
                      BUN/CRE    22.3 RATIO High       10-20      [facility]  
   
                                        Comment on above:   Order Comment: UNK  
N   
   
                                                            Performed By: #### L  
500.4050, L503.6030, L500.4100, .3500,   
L503.0105, L506.0400, L503.6550, L501.9985, L506.1000, L506.0250,   
L100.0500, L501.2450, L501.9520 ####  
[facility]  
[address] [place], [place], [address] [phone]   
   
                      CA,Total   8.9 mg/dL  Normal     8.5-10.1   [facility]  
   
                                        Comment on above:   Order Comment: UNK  
N   
   
                                                            Performed By: #### L  
500.4050, L503.6030, L500.4100, .3500,   
L503.0105, L506.0400, L503.6550, L501.9985, L506.1000, L506.0250,   
L100.0500, L501.2450, L501.9520 ####  
[facility]  
[address] [place], [address]  
[phone]   
   
                                                    Chloride   
[Moles/Vol]     102 mmol/L      Normal                    [facility]  
   
                                        Comment on above:   Order Comment: UNK  
N   
   
                                                            Performed By: #### L  
500.4050, L503.6030, L500.4100, .3500,   
L503.0105, L506.0400, L503.6550, L501.9985, L506.1000, L506.0250,   
L100.0500, L501.2450, L501.9520 ####  
[facility]  
[address] [place], [address]  
[phone]   
   
                      CO2 [Moles/Vol] 26.0 mmol/L Normal     21.0-32.0  [facility]  
   
                                        Comment on above:   Order Comment: UNK  
N   
   
                                                            Performed By: #### L  
500.4050, L503.6030, L500.4100, .3500,   
L503.0105, L506.0400, L503.6550, L501.9985, L506.1000, L506.0250,   
L100.0500, L501.2450, L501.9520 ####  
[facility]  
[address] [place], [address]  
[phone]   
   
                                                    Creatinine   
[Mass/Vol]      0.81 mg/dL      Normal          0.55-1.02       [facility]  
   
                                        Comment on above:   Order Comment: UNK  
N   
   
                                                            Result Comment: The   
validity of the calculated GFR GFRAA in   
patients over  
70 years has not been determined. Clinical correlation is  
essential.   
   
                                                            Performed By: #### L  
500.4050, L503.6030, L500.4100, .3500,   
L503.0105, L506.0400, L503.6550, L501.9985, L506.1000, L506.0250,   
L100.0500, L501.2450, L501.9520 ####  
[facility]  
[address] [place], [address]  
[phone]   
   
                      EST GFR - AA 98 mL/min  Normal     >60        [facility]  
   
                                        Comment on above:   Order Comment: UNK  
N   
   
                                                            Result Comment: Afri  
can American GFR Calc   
   
                                                            Performed By: #### L  
500.4050, L503.6030, L500.4100, .3500,   
L503.0105, L506.0400, L503.6550, L501.9985, L506.1000, L506.0250,   
L100.0500, L501.2450, L501.9520 ####  
[facility]  
[address] [place], [place], [address] [phone]   
   
                      GAP        7          Normal     5-15       [facility]  
   
                                        Comment on above:   Order Comment: UNK  
N   
   
                                                            Performed By: #### L  
500.4050, L503.6030, L500.4100, .3500,   
L503.0105, L506.0400, L503.6550, L501.9985, L506.1000, L506.0250,   
L100.0500, L501.2450, L501.9520 ####  
[facility]  
[address] [place], [address] [phone]   
   
                                                    GFR/1.73 sq   
M.predicted among   
non-blacks MDRD   
(S/P/Bld) [Vol   
rate/Area]      81 mL/min/{1.73_m2} Normal          >60             [facility]  
   
                                        Comment on above:   Order Comment: UNK  
N   
   
                                                            Result Comment: Non-  
 GFR Calc   
   
                                                            Performed By: #### L  
500.4050, L503.6030, L500.4100, .3500,   
L503.0105, L506.0400, L503.6550, L501.9985, L506.1000, L506.0250,   
L100.0500, L501.2450, L501.9520 ####  
[facility]  
[address] [place], [place], [address] [phone]   
   
                                                    Globulin (S)   
[Mass/Vol]      3.5 g/dL        Normal          2.2-4.2         [facility]  
   
                                        Comment on above:   Order Comment: UNK  
N   
   
                                                            Performed By: #### L  
500.4050, L503.6030, L500.4100, .3500,   
L503.0105, L506.0400, L503.6550, L501.9985, L506.1000, L506.0250,   
L100.0500, L501.2450, L501.9520 ####  
[facility]  
[address] [place], [address]  
[phone]   
   
                      Glucose [Mass/Vol] 92 mg/dL   Normal          [facility]  
   
                                        Comment on above:   Order Comment: UNK  
N   
   
                                                            Performed By: #### L  
500.4050, L503.6030, L500.4100, .3500,   
L503.0105, L506.0400, L503.6550, L501.9985, L506.1000, L506.0250,   
L100.0500, L501.2450, L501.9520 ####  
[facility]  
[address] [place], [address]  
[phone]   
   
                                                    Potassium   
[Moles/Vol]     4.5 mmol/L      Normal          3.5-5.1         [facility]  
   
                                        Comment on above:   Order Comment: UNK  
N   
   
                                                            Performed By: #### L  
500.4050, L503.6030, L500.4100, .3500,   
L503.0105, L506.0400, L503.6550, L501.9985, L506.1000, L506.0250,   
L100.0500, L501.2450, L501.9520 ####  
[facility]  
[address] [place], [address]  
[phone]   
   
                      Sodium [Moles/Vol] 135 mmol/L Low        136-145    [facility]  
   
                                        Comment on above:   Order Comment: UNK  
N   
   
                                                            Performed By: #### L  
500.4050, L503.6030, L500.4100, .3500,   
L503.0105, L506.0400, L503.6550, L501.9985, L506.1000, L506.0250,   
L100.0500, L501.2450, L501.9520 ####  
[facility]  
[address] [place], [place], [address] [phone]   
   
                      T PROT     7.2 g/dL   Normal     6.4-8.2    [facility]  
   
                                        Comment on above:   Order Comment: UNK  
N   
   
                                                            Performed By: #### L  
500.4050, L503.6030, L500.4100, .3500,   
L503.0105, L506.0400, L503.6550, L501.9985, L506.1000, L506.0250,   
L100.0500, L501.2450, L501.9520 ####  
[facility]  
[address] [place], [place], [address] [phone]   
   
                                                    Urea nitrogen   
[Mass/Vol]      18 mg/dL        Normal          7-18            [facility]  
   
                                        Comment on above:   Order Comment: UNK  
N   
   
                                                            Performed By: #### L  
500.4050, L503.6030, L500.4100, .3500,   
L503.0105, L506.0400, L503.6550, L501.9985, L506.1000, L506.0250,   
L100.0500, L501.2450, L501.9520 ####  
[facility]  
[address] [place], [place], [address] [phone]   
   
                                                    Ferritinon 2024   
   
                                                    Ferritin   
[Mass/Vol]      51 ng/mL        Normal          8-252           [facility]  
   
                                        Comment on above:   Order Comment: UNK  
N   
   
                                                            Performed By: #### L  
500.4050, L503.6030, L500.4100, .3500,   
L503.0105, L506.0400, L503.6550, L501.9985, L506.1000, L506.0250,   
L100.0500, L501.2450, L501.9520 ####  
[facility]  
[address] [place], [place], [address] [phone]   
   
                                                    Folates, (Folic Acid)on    
   
                      FOLATES    7.30 ng/mL Normal     3.1-55.4   [facility]  
   
                                        Comment on above:   Order Comment: UNK  
N   
   
                                                            Performed By: #### L  
500.4050, L503.6030, L500.4100, .3500,   
L503.0105, L506.0400, L503.6550, L501.9985, L506.1000, L506.0250,   
L100.0500, L501.2450, L501.9520 ####  
[facility]  
[address] [place], [address]  
[phone]   
   
                                                    Hemoglobin A1con 2024   
   
                                                    HbA1c (Bld) [Mass   
fraction]       5.7 %           High            3.8-5.6         [facility]  
   
                                        Comment on above:   Result Comment: Norm  
al < 5.7 %  
Prediabetic 5.7 - 6.4 %  
Diabetic >or= 6.5 %  
Please note range changes.   
   
                                                            Performed By: #### L  
3300.3500 ####  
[facility]  
[address] [place], [place], [address] [phone]   
   
                                                    Iron+Iron Binding Capacityon  
 2024   
   
                      Iron [Mass/Vol] 125 ug/dL  Normal          [facility]  
   
                                        Comment on above:   Order Comment: UNK  
N   
   
                                                            Performed By: #### L  
500.4050, L503.6030, L500.4100, .3500,   
L503.0105, L506.0400, L503.6550, L501.9985, L506.1000, L506.0250,   
L100.0500, L501.2450, L501.9520 ####  
[facility]  
[address] [place], [address]  
[phone]   
   
                      IRON SATURATION 32.8       Normal     15.0-55.0  [facility]  
   
                                        Comment on above:   Order Comment: UNK  
N   
   
                                                            Performed By: #### L  
500.4050, L503.6030, L500.4100, .3500,   
L503.0105, L506.0400, L503.6550, L501.9985, L506.1000, L506.0250,   
L100.0500, L501.2450, L501.9520 ####  
[facility]  
[address] [place], [place], [address] [phone]   
   
                      TIBC       381 ug/dL  Normal     250-450    [facility]  
   
                                        Comment on above:   Order Comment: UNK  
N   
   
                                                            Performed By: #### L  
500.4050, L503.6030, L500.4100, .3500,   
L503.0105, L506.0400, L503.6550, L501.9985, L506.1000, L506.0250,   
L100.0500, L501.2450, L501.9520 ####  
[facility]  
[address] [place], [address] [phone]   
   
                                                    Lipaseon 2024   
   
                                                    Lipase [Catalytic   
activity/Vol]   24 U/L          Normal          13-75           [facility]  
   
                                        Comment on above:   Order Comment: UNK  
N   
   
                                                            Result Comment: Harrison kahn note:  
LIPASE revised reference range effective 23.  
New Lipase methodology. Expected to produce lower values  
than the previous assay method.  
NEW Reference Range: 13 - 75 U/L   
   
                                                            Performed By: #### L  
500.4050, L503.6030, L500.4100, .3500,   
L503.0105, L506.0400, L503.6550, L501.9985, L506.1000, L506.0250,   
L100.0500, L501.2450, L501.9520 ####  
[facility]  
[address] [place], [place], [address] [phone]   
   
                                                    Lipid Profileon 2024   
   
                                                    Cholesterol   
[Mass/Vol]      245 mg/dL       High            200             [facility]  
   
                                        Comment on above:   Order Comment: UNK  
N   
   
                                                            Result Comment: <200  
 mg/dL Desirable  
200-240 mg/dL Borderline  
>240 mg/dL High Risk   
   
                                                            Performed By: #### L  
500.4050, L503.6030, L500.4100, .3500,   
L503.0105, L506.0400, L503.6550, L501.9985, L506.1000, L506.0250,   
L100.0500, L501.2450, L501.9520 ####  
[facility]  
[address] [place], [address]  
[phone]   
   
                                                    Cholesterol in HDL   
[Mass/Vol]      60 mg/dL        Normal                          [facility]  
   
                                        Comment on above:   Order Comment: UNK  
N   
   
                                                            Result Comment: The   
drugs N-Acetylcysteine and Metamizole may   
falsely  
depress this assay.  
Reference Range  
HDL <40 mg/dL Low HDL Cholesterol  
HDL >or= 60 mg/dL High HDL Cholesterol   
   
                                                            Performed By: #### L  
500.4050, L503.6030, L500.4100, .3500,   
L503.0105, L506.0400, L503.6550, L501.9985, L506.1000, L506.0250,   
L100.0500, L501.2450, L501.9520 ####  
[facility]  
[address] [place], [address]  
[phone]   
   
                                                    Cholesterol in LDL   
[Mass/Vol]      148 mg/dL       High            0-130           [facility]  
   
                                        Comment on above:   Order Comment: UNK  
N   
   
                                                            Performed By: #### L  
500.4050, L503.6030, L500.4100, .3500,   
L503.0105, L506.0400, L503.6550, L501.9985, L506.1000, L506.0250,   
L100.0500, L501.2450, L501.9520 ####  
[facility]  
[address] [place], [address]  
[phone]   
   
                                                    Cholesterol in   
VLDL [Mass/Vol] 37 mg/dL        Normal          5-40            [facility]  
   
                                        Comment on above:   Order Comment: UNK  
N   
   
                                                            Performed By: #### L  
500.4050, L503.6030, L500.4100, .3500,   
L503.0105, L506.0400, L503.6550, L501.9985, L506.1000, L506.0250,   
L100.0500, L501.2450, L501.9520 ####  
[facility]  
[address] [place], [address]  
[phone]   
   
                                                    Triglyceride   
[Mass/Vol]      184 mg/dL       Normal                          [facility]  
   
                                        Comment on above:   Order Comment: UNK  
N   
   
                                                            Result Comment: The   
drugs N-Acetylcysteine and Metamizole may   
falsely  
depress this assay.  
Serum Triglycerides Reference Interval  
Normal <150 mg/dL  
Borderline high 150 - 199 mg/dL  
High 200 - 499 mg/dL  
Very High > or = 500 mg/dL   
   
                                                            Performed By: #### L  
500.4050, L503.6030, L500.4100, .3500,   
L503.0105, L506.0400, L503.6550, L501.9985, L506.1000, L506.0250,   
L100.0500, L501.2450, L501.9520 ####  
[facility]  
[address] [place], [address]  
[phone]   
   
                                                    T4 Free Directon 2024   
   
                      T4 FREE DIRECT 0.82 ng/dL Normal     0.76-1.46  [facility]  
   
                                        Comment on above:   Order Comment: GURMEET MORENO ADD ON TO BLOOD IN LAB, THANKS   
   
                                                            Performed By: #### L  
3300.3500 ####  
[facility]  
[address] [place], [address]  
[phone]   
   
                                                    Thyroid Stim Hormone (TSH)on  
 2024   
   
                          TSH          2.020 uIU/mL Normal       0.358-3.74  
0                                       [facility]  
   
                                        Comment on above:   Order Comment: UNK  
N   
   
                                                            Performed By: #### L  
500.4050, L503.6030, L500.4100, .3500,   
L503.0105, L506.0400, L503.6550, L501.9985, L506.1000, L506.0250,   
L100.0500, L501.2450, L501.9520 ####  
[facility]  
[address] [place], [address]  
[phone]   
   
                                                    Vitamin B12on 2024   
   
                                                    Cobalamin (Vitamin   
B12) [Mass/Vol] 536 pg/mL       Normal          211-911         [facility]  
   
                                        Comment on above:   Performed By: #### L  
3300.3500 ####  
[facility]  
[address] [place], [place], [address] [phone]   
   
                                                    Vitamin D,25 Hydroxyon    
   
                      Vitamin D 25-OH 20.8 ng/mL Normal                [facility]  
   
                                        Comment on above:   Result Comment: Krystal  
min D 25(OH) Status Range  
Deficiency <20 ng/mL (50nmol/L)  
Insufficiency 20 - 30 ng/mL (50 - 75 nmol/L)  
Sufficiency 30 - 100 ng/mL (75 - 250 nmol/L)  
Toxicity >100 ng/mL (>250 nmol/L)   
   
                                                            Performed By: #### L  
3300.3500 ####  
[facility]  
[address] [place], [place], [address] [phone]   
   
                                                    CT CARDIAC SCORING WO IV CON  
TRASTon 2024   
   
                                                    CT CARDIAC SCORING   
WO IV CONTRAST                          Interpreted By: Jaron Banks,  
STUDY:  
CT CARDIAC SCORING WO IV   
CONTRAST; 2024 9:12 am  
  
INDICATION:  
Signs/Symptoms:SCREENING.  
  
COMPARISON:  
None.  
  
ACCESSION NUMBER(S):  
[id number]  
  
ORDERING CLINICIAN:  
JOSE SLOAN  
  
TECHNIQUE:  
Using prospective ECG   
gating, limited CT scan of   
the chest for  
evaluation of coronary   
arteries was performed   
without intravenous  
contrast. Coronary calcium   
scoring was performed   
according to the  
method of Agatston.  
  
FINDINGS:  
The score and distribution   
of calcium in the coronary   
arteries is as  
follows:  
  
LM: 0.  
LAD: 0.  
LCx: 0.  
RCA: 0.  
  
Total: 0.  
  
The visualized segments of   
the lungs are normally   
expanded. Mild  
patchy   
infiltrates/atelectasis   
posteromedial right lower   
lobe.  
  
The visualized mid/lower   
ascending thoracic aorta   
measures 3 cm in  
diameter.  
  
The heart is normal in size.   
No significant pericardial   
effusion is  
present.  
  
Mildly prominent nonspecific   
distal paraesophageal nodes   
up to 6 mm  
short axis.  
  
Small hiatal hernia. Some   
fluid in the distal   
esophagus may be  
sequela reflux. Visualized   
stomach distended with   
ingested contents.  
8 mm low-density left   
hepatic lobe lesion probably   
representing a  
cyst although incompletely   
characterized.  
  
IMPRESSION:  
1. Coronary artery calcium   
score of 0*.  
2. Mild patchy   
infiltrate/atelectasis   
posteromedial right lower   
lobe.  
3. Additional findings as   
above.  
  
*Coronary artery calcium   
scoring may be helpful in   
predicting the  
risk for future coronary   
heart disease events.   
According to the  
American College of   
Cardiology Foundation   
Clinical Expert Consensus  
Task Force, such testing   
provides important   
prognostic information in  
patients with more than one   
coronary heart disease risk   
factor. The  
coronary artery calcium   
score correlates with the   
annual risk of a  
non-fatal myocardial   
infarction or coronary heart   
disease death.  
  
Coronary artery score Annual   
Risk  
  
0-99 0.4%  
100-399 1.3%  
>400 2.4%  
  
These three  breakpoints    
correspond to lower,   
intermediate and high  
risk states for future   
coronary events. Such   
information should be  
used, along with appropriate   
clinical judgment, to make   
decisions  
regarding the intensity of   
risk factor management   
strategies to treat  
blood lipids and to modify   
other non-lipid coronary   
risk factors.  
  
Reference: Moses P et   
al. Circulation. 2007;   
115:402-426  
  
MACRO:  
None  
  
Signed by: Jaron Banks   
2024 6:32 PM  
Dictation workstation:   
[id number]        [facility]  
   
                                                    Surgery Visit Reporton    
   
                                                    Surgery Visit   
Report                                  [facility]  
[address] [place] [address]. [address]  
[place] [address]  
[phone]  
  
OFFICE VISIT  
Date of Service: 24  
  
MR#: [id number] Acct:   
[id number]  
  
Name: MAGGY CARPIO Rep   
#: [id number]  
8  
: 1975 Provider:   
Dr. Faye lindsey MD  
Age/Sex: 48/F Location:   
[facility]  
  
Status: Signed  
  
Intake  
Vital Signs  
  
  
24  
06:33 24  
07:38  
  
Height 5 ft 10 in 5 ft 10 in  
  
  
Intake  
Visit Reasons: check   
incision/red  
Chief Complaint: check   
inscision/red  
Is patient in pain?: No  
Allergies  
  
No Known Allergies Allergy   
(Verified 24 12:00)  
  
  
  
Medications  
  
  
  
???Medication   
???Instructions ???Recorded   
???Confirmed ???Type  
  
cholecalciferol (vitamin D3)   
125 125 mcg PO DAILY   
22 History  
  
mcg (5,000 unit) capsule  
  
magnesium glycinate 100 mg   
(as 100 mg PO DAILY 24 History  
  
glycinate) tablet  
  
acetaminophen 500 mg capsule   
500 mg PO Q6H PRN pain   
24 History  
  
  
  
  
PFSH  
Medical History (Reviewed   
24 @ 13:01 by Kate Potts)  
  
Wears glasses  
History of steroid therapy  
History of pain when walking  
Anxiety  
Migraine headache  
Heartburn  
History of IBS  
Non-smoker  
History of drainage of   
abscess ( 2022)  
Pilonidal abscess of    
cleft  
Hand, foot and mouth disease  
  
  
Surgical History (Reviewed   
24 @ 13:01 by Kate Potts)  
  
S/P inguinal hernia repair  
History of incision and   
drainage  
History of   
esophagogastroduodenoscopy   
(EGD)  
History of arthroscopy of   
left knee  
History of surgical removal   
of ganglion cyst  
History of ankle surgery  
History of umbilical hernia   
repair  
History of laparoscopic   
cholecystectomy  
History of colonoscopy  
  
  
Family History (Reviewed   
24 @ 13:01 by Kate Potts)  
Father Colon polyps  
Diabetes  
AAA (abdominal aortic   
aneurysm)  
Hypertension  
Mother Colon polyps  
Neuropathy  
  
  
Social History (Reviewed   
24 @ 13:01 by Kate Potts)  
Smoking Status: Never smoker  
  
  
  
HPI  
HPI  
HPI:  
48-year-old female status   
post bilateral laparoscopic   
Hernia pair with mesh May   
17,024.  
  
ROS  
General  
General: No weight change,   
appetite, fatigue, colon   
cancer, breast cancer or   
weakness  
HEENT  
HEENT: No difficulty   
swallowing, eye injury, eye   
surgery, swollen glands or   
hoarseness  
Endo  
Endocrine: No thyroid   
disease, diabetes mellitus,   
thyroid cancer, Hair loss,   
heat intolerance or  
cold intolerance  
Skin  
Skin: No rash or changing   
moles  
Breast  
Breast: No left breast lump,   
right breast lump, nipple   
discharge, breast pain,   
abnormal mammogram,  
abnormal US or breast   
enlargement  
Musc  
Musculoskeletal: No back   
problems, arthritis,   
rheumatoid arthritis, gout   
or joint pain  
Cardio  
Cardiovascular: No murmur,   
pacemaker, heart disease,   
atrial fibrillation, high   
blood pressure, heart  
attack, heart stent,   
palpitations, shortness of   
breat with exertion or chest   
pain  
Psych  
Psychiatric: No depression,   
anxiety or hearing voices  
Resp  
Respiratory: No shortness of   
breath, No sleep apnea, No   
cough, No COPD, No asthma,   
No emphysema and  
No wheezing  
Gastro  
Gastrointestinal: No   
abdominal pain, No nausea or   
vomiting, No diarrhea, No   
constipation, No blood  
in stool, No acid reflux, No   
hemorrhoids, No ulcers, No   
gallbladder problem and No   
black,tarry  
stools  
Hema  
Hematologic: No blood   
thinners, No blood   
disorders, No bleeding, No   
anemia and No blood clots  
Neuro  
Neurologic: No system   
reviewed and no additional   
complaints, except as   
documented, No as per HPI,   
No  
abnormal gait, No abnormal   
hearing, No abnormal   
movements, No abnormal   
speech, No behavioral  
changes, No burning   
sensations, No confusion, No   
convulsions, No   
disequilibrium, No   
dizziness, No  
localized weakness, No   
frequent falls, No   
headache(s), No lack of   
coordination, No loss of   
vision,  
No memory loss, No numbness,   
No other visual   
disturbances, No radicular   
pain, No restless legs, No  
sensory deficit, No syncope,   
No tingling, No tremor(s),   
No weakness and No other  
  
Exam  
GI  
Other:  
Right lateral transverse   
infraumbilical incision has   
a 3 x 2 mm area of erythema.  
  
Assessment and Plan  
Assessment and Plan  
(1) Suture reaction:  
Status: Acute  
Qualifiers:  
Encounter type: initial   
encounter Qualified Code(s):   
T81.89XA - Other   
complications of  
procedures, not elsewhere   
classified, initial   
encounter  
  
Plan  
I explored the right lateral   
aspect of her infraumbilical   
incision. Clearly a small   
suture abscess.  
Simply use forceps to help   
debride the area. Using   
silver nitrate. Applied a   
Band-Aid. I anti  
cipate spontaneous   
resolution. The patient's   
had an opportunity ask and   
have questions answered.  
Further office appointment   
can be as needed.  
  
Copy: Dr. Jose Valero M.D.,   
F.A.C.S.  
  
Coding  
Level of Care Code  
Global Post Op  
  
Diagnoses  
Suture reaction, initial   
encounter T81.89XA  
Encounter type: initial   
encounter  
  
  
(more content not   
included)...        Normal                                  [facility]  
   
                                                    Surgery Visit Reporton    
   
                                                    Surgery Visit   
Report                                  [facility]  
[address] [place] [address]. [address]  
[place] [address]  
[phone]  
  
OFFICE VISIT  
Date of Service: 24  
  
MR#: [id number] Acct:   
[id number]  
  
Name: MAGGY CARPIO Rep   
#: [id number]  
2  
: 1975 Provider:   
MARIYA bueno  
Age/Sex: 48/F Location:   
[facility]  
  
Status: Signed  
  
Intake  
Vital Signs  
  
  
24  
15:34 24  
06:33  
  
Height 5 ft 10 in 5 ft 10 in  
  
  
Intake  
Visit Reasons: HERNIA DOS   
  
Chief Complaint: hernia f/u  
 Required: No  
Is patient in pain?: No  
Allergies  
  
No Known Allergies Allergy   
(Verified 24 13:00)  
  
  
  
Medications  
  
  
  
???Medication   
???Instructions ???Recorded   
???Confirmed ???Type  
  
cholecalciferol (vitamin D3)   
125 125 mcg PO DAILY   
22 History  
  
mcg (5,000 unit) capsule  
  
magnesium glycinate 100 mg   
tablet 100 mg PO DAILY   
24 History  
  
acetaminophen 500 mg capsule   
500 mg PO Q6H PRN pain   
24 History  
  
  
  
  
Subjective  
Details:  
Patient is a 47 y/o F I am   
following s/p laparoscopic   
bilateral inguinal hernia   
repairs with mesh by  
Dr. Valero on 24.   
Patient tolerated the   
procedure well. Patient   
notes intermittent sharp   
pain in  
the right groin which lasts   
1-2 seconds and then   
resolves. She notes   
otherwise her recovery has   
been  
well. She denies having   
nausea, vomiting, fever   
since the procedure. She   
notes appetite and bowel  
habits have returned to her   
normal.  
  
Objective  
Details:  
Abdomen- incisions c/d/i. No   
erythema or infection noted.   
No recurrent hernia noted.  
  
Coding  
Level of Care Code  
Global Post Op  
  
Diagnoses  
S/P bilateral inguinal   
hernia repair Z98.890;   
Z87.19  
  
[facility]  
Medical History (Reviewed   
24 @ 13:01 by Kate Potts)  
  
Wears glasses  
History of steroid therapy  
History of pain when walking  
Anxiety  
Migraine headache  
Heartburn  
History of IBS  
Non-smoker  
History of drainage of   
abscess ( 2022)  
Pilonidal abscess of    
cleft  
Hand, foot and mouth disease  
  
  
Surgical History (Reviewed   
24 @ 13:01 by Kate Potts)  
  
S/P inguinal hernia repair  
History of incision and   
drainage  
History of   
esophagogastroduodenoscopy   
(EGD)  
History of arthroscopy of   
left knee  
History of surgical removal   
of ganglion cyst  
History of ankle surgery  
History of umbilical hernia   
repair  
History of laparoscopic   
cholecystectomy  
History of colonoscopy  
  
  
Family History (Reviewed   
24 @ 13:01 by Kate Potts)  
Father Colon polyps  
Diabetes  
AAA (abdominal aortic   
aneurysm)  
Hypertension  
Mother Colon polyps  
Neuropathy  
  
  
Social History (Reviewed   
24 @ 13:01 by Kate Potts)  
Smoking Status: Never smoker  
  
  
  
Assessment and Plan (No   
Qualifiers)  
Assessment and Plan  
(1) S/P bilateral inguinal   
hernia repair:  
Status: Acute  
Plan:  
Recommend no lifting greater   
than 20 pounds for 6   
additional weeks  
RTW letter was provided  
Follow-up as needed  
  
  
24 0728  
  
Date ___________   
____________________________  
_______  
  
Doreen Robison Signature: Date   
___________   
____________________________  
_______  
  
(if applicable)  
  
CC:                 Normal                                  [facility]  
   
                                                    Discharge Instructionon    
   
                                                    Discharge   
Instruction                             [facility]  
Medical Records Department  
[address] [place] [address]  
  
Instructions for   
Home/Discharge Instructions  
24  
MR#: [id number] Acct:   
[id number]  
Name: MAGGY CARPIO Rep   
#: [id number]  
: 1975 48 From:   
Faye Valero MD  
PCP: Dr. Jose Sloan MD Status:REG [facility]  
  
  
Discharge Instructions  
Procedure  
General Surgery  
Diet  
Discharge Diet: Light diet -   
advance as tolerated (if you   
have questions about your   
diet  
instructions, please talk to   
you doctor.)  
Activity  
Discharge Activity: May Not   
Drive (for 3-5 days or while   
taking narcotic pain   
medicine.)  
May shower in (days): 1  
Lifting Restrictions: 10   
pounds  
Dressing / Incision  
Call your doctor if your   
incision/area has:   
Continuous Slow Oozing,   
Sudden Increased Bleeding,  
Increased Pain/ Swelling,   
Increased Redness and Foul   
Smelling Discharge  
Call your doctor if you   
observe: Fever of 101 or   
Higher  
Suture Line Care: Avoid   
Pulling/Pushing and Avoid   
Pinching/Bending  
Additional Dressing/Incision   
Instructions:: Change or   
remove dressing in 4 days.   
Leave steri-strips  
in place for 1 week.  
Follow Up Care  
Please Follow Up With:   
Faye Valero MD  
When: Call [phone] to   
make an appointment to be   
seen in about 10 days.  
Test Results:  
Test results from this visit   
will be discussed in further   
detail at your follow-up   
appointment, if  
applicable.  
  
  
Discharge Plan  
Admission  
Attending Provider:   
Faye Valero  
  
Primary Care Provider:   
Jose Sloan  
  
Instructions  
Print Language: English  
  
Discharge   
Orders/Prescriptions  
Prescriptions:  
No Action  
cholecalciferol (vitamin D3)   
125 mcg (5,000 unit) capsule  
125 mcg PO DAILY  
magnesium glycinate 100 mg   
tablet  
100 mg PO DAILY  
  
Referrals / Follow Up:  
Jose Sloan MD   
[Primary Care Provider] -  
  
Disposition  
Disposition (needs filled in   
before D/C Order can be   
placed): Home, Self Care  
  
  
  
  
  
24 0944 Faye Valero MD  
  
  
  
CC: Dr. Jose Sloan MD  
Signed              Normal                                  [facility]  
   
                                                    Operative Reporton   
4   
   
                                        Operative Report    [facility]  
Medical Records Department  
[address] [place] [address]  
  
Operative Report  
24 0903  
MR#: [id number] Acct:   
[id number]  
Name: MAGGY CARPIO Rep   
#: [id number]  
: 1975 48 From:   
Faye Valero MD  
PCP: Dr. Jose Sloan MD Status:REG [facility]  
  
Location: [place] [address]  
  
  
  
  
Report of Operation  
Date of Procedure: 24  
Pre-Operative Diagnosis:   
Indirect right inguinal   
hernia possible left   
inguinal hernia  
Post-Operative Diagnosis:   
Bilateral indirect inguinal   
hernias with moderately   
large cord lipoma on  
the right  
Surgery/Procedure   
Performed:: Laparoscopic   
bilateral inguinal   
herniorrhaphy  
Extra-large Bard 3D max mesh   
right Lot number [id number],   
reference [id number], expiry   
date 2027  
Large Bard 3D max left mesh   
lot number [id number],   
reference [id number], expiry   
date 2028  
Secure strap Lot [id number],   
expiry date 2025  
Description of Surgical   
Findings::  
Timeout informed consent was   
obtained. 48-year-old female   
was taken to the op room   
placed on the  
table underwent general   
tracheal ovation esthesia   
Ancef 2 g given   
intravenously the abdomen  
sterilely prepped and draped   
patient has a previous   
curvilinear incision in the   
inferior portion of  
the umbilicus the skin scar   
was elliptically excised   
sharp dissection carried   
down through subcu  
tissue and fascia was   
identified holding sutures   
of 0 Vicryl placed the   
fascia was elevated small  
incision was created became   
evident that there was a   
piece of mesh slipped just   
beneath the lower  
edge use that couple more 0   
Vicryl sutures to elevate   
the posterior fascia   
directly made an opening  
directly was able to   
visualize and placed a   
trocar. No evidence of any   
trocar injuries the abdomen  
was insufflated with CO2 to   
a pressure of 10 mmHg   
pressure. Under direct   
visualization bilateral  
ilioinguinal nerve blocks   
were performed with 0.5%   
Marcaine. Throughout the   
procedure a total of 30  
cc was used. 5 mm trocars   
were placed on the right and   
left lower quadrant. There   
was evidence of  
an indirect inguinal hernia   
on the right and smaller   
defect on the left. The   
peritoneum superior  
lateral to the internal ring   
on the right was incised   
carried medially the   
peritoneum was completely  
dissected free of marginal   
large cord lipoma on the   
right was identified this   
had to be dissected  
free and throughout that   
hemostasis attained with   
hemoclips and   
electrocautery. Because of   
dense  
adherence of the peritoneum   
to the right round ligament   
I put Hem-o-matt clips on the   
round ligament  
and transected it. This   
allowed for full exposure to   
the direct indirect and   
femoral area on the  
right. I then incised the   
peritoneum superior lateral   
to the internal ring on the   
left. It  
medially dissected the   
peritoneum completely free a   
much smaller defect was   
noted on the left  
indirect. The peritoneum   
could be to completely   
dissected free this time   
from the round ligament  
leaving the round ligament   
intact. The dissection from   
the right was visualized   
pubic tubercle  
identified.  
  
Extra-large Bard 3D max was   
placed on the right it was   
secured laterally superiorly   
and medially  
there was secure strap the   
edge of the mesh was secured   
to the pubic tubercle and   
nicely folded  
beneath the tubercle   
tubercle to keep the direct   
indirect and femoral areas   
nicely covered. I  
placed a large mesh on the   
left the to overlap slightly   
in the middle secured   
laterally superiorly  
and medially with secure   
strap and also secured it to   
the pubic tubercle. I felt   
that I had  
excellent coverage   
bilaterally. The cord lipoma   
on the right was placed back   
underneath the  
peritoneum and the   
peritoneum was approximated   
to itself using combination   
of secure strap and  
Hem-o-matt clips. Complete   
obliteration to the mesh was   
achieved.  
  
The abdomen is allowed to   
deflate of the CO2. The   
fascial defect was   
meticulously approximated   
with  
simple sutures of 0 Nurolon.   
Good closure was felt to   
have been achieved. Wound   
edges were  
approximated with   
interrupted a running   
subicular 4-0 Monocryl.   
Steri-Strips Telfa OpSite   
dressings  
applied. Sponge and   
instrument and needle counts   
were reported to the surgeon   
to be correct.  
  
Specimens none. Drains none.   
Blood loss 20 cc.  
  
The patient was taken to the   
recovery room in satisfied   
condition without apparent   
complication  
  
aFye Valero M.D.,   
F.A.C.S.  
Surgeon: Faye Valero  
Type of Anesthesia: General   
and Local  
Anesthesiologist:   
Meghan Menezes  
  
  
  
24 0944  
  
  
Cosigner Signature (if   
applicable):  
  
  
  
CC: Dr. Jose Sloan MD; Dr. Faye Valero MD  
  
Signed              Normal                                  [facility]  
   
                                                    Pregnancy,Urineon 2024  
   
   
                                                    Beta HCG   
(pregnancy test)   
Ql (U)          Negative        Normal                          [facility]  
   
                                        Comment on above:   Result Comment: Very  
 dilute urine specimens, as indicated by a  
low   
specific  
gravity, may not contain representative levels of hCG.  
If pregnancy is still suspected, a first morning urine  
specimen should be collected 48 hours later and tested.   
   
                                                            Performed By: #### L  
500.4050, L503.6030, L500.4100, .3500,   
L503.0105, L506.0400, L503.6550, L501.9985, L506.1000, L506.0250,   
L100.0500, L501.2450, L501.9520 ####  
[facility]  
[address] [facility] [place], [address] [phone]   
   
                                                    Insulin Levelon 2024   
   
                      INSULIN,FASTING 8.5 uIU/mL Normal     2.6-24.9   [facility]  
   
                                        Comment on above:   Order Comment: AARONAS  
E ADD ON TO BLOOD IN LAB, THANKS   
   
                                                            Result Comment: Perf  
ormed at: [facility] [employer] [id number]  
: Jerry Bobby PhD, Phone: [phone]   
   
                                                            Performed By: #### L  
3300.3500 ####  
[facility]  
[address] [place], [address] [phone]   
   
                                                    L803.0600on 2024   
   
                      HOMOCYSTEINE 10.1 umol/L Normal     0.0-14.5   [facility]  
   
                                        Comment on above:   Result Comment: Perf  
ormed at: [facility] [employer] [id number]  
: Jerry Bobby PhD, Phone: [phone]   
   
                                                            Performed By: #### L  
500.4050, L503.6030, L500.4100, .3500,   
L503.0105, L506.0400, L503.6550, L501.9985, L506.1000, L506.0250,   
L100.0500, L501.2450, L501.9520 ####  
[facility]  
[address] [place], [address]  
[phone]   
   
                                                    CBC-Complete Blood Cnt No Di  
ffon 2024   
   
                                                    Erythrocyte   
distribution width   
(RBC) [Ratio]   11.9 %          Normal          11.6-14.6       [facility]  
   
                                        Comment on above:   Order Comment: PLEAS  
E ADD ON TO BLOOD IN LAB, THANKS   
   
                                                            Performed By: #### L  
3300.3500 ####  
[facility]  
[address] [place], [address]  
[phone]   
   
                                                    Hematocrit (Bld)   
[Volume fraction] 43.5 %          Normal          37-47           [facility]  
   
                                        Comment on above:   Order Comment: PLEAS  
E ADD ON TO BLOOD IN LAB, THANKS   
   
                                                            Performed By: #### L  
3300.3500 ####  
[facility]  
[address] [place], [address]  
[phone]   
   
                                                    Hemoglobin (Bld)   
[Mass/Vol]      14.5 g/dL       Normal          12.0-15.0       [facility]  
   
                                        Comment on above:   Order Comment: PLEAS  
E ADD ON TO BLOOD IN LAB, THANKS   
   
                                                            Performed By: #### L  
3300.3500 ####  
[facility]  
[address] [place], [address]  
[phone]   
   
                                                    MCH (RBC) [Entitic   
mass]           31.2 pg         Normal          27.0-32.0       [facility]  
   
                                        Comment on above:   Order Comment: PLEAS  
E ADD ON TO BLOOD IN LAB, THANKS   
   
                                                            Performed By: #### L  
3300.3500 ####  
[facility]  
[address] [place], [address]  
[phone]   
   
                                                    MCHC (RBC)   
[Mass/Vol]      33.3 g/dL       Normal          32-36           [facility]  
   
                                        Comment on above:   Order Comment: PLEAS  
E ADD ON TO BLOOD IN LAB, THANKS   
   
                                                            Performed By: #### L  
3300.3500 ####  
[facility]  
[address] [place], [address]  
[phone]   
   
                                                    MCV (RBC) [Entitic   
vol]            93.5 fL         Normal          81-99           [facility]  
   
                                        Comment on above:   Order Comment: PLEAS  
E ADD ON TO BLOOD IN LAB, THANKS   
   
                                                            Performed By: #### L  
3300.3500 ####  
[facility]  
[address] [place], [address]  
[phone]   
   
                                                    Platelet mean   
volume (Bld)   
[Entitic vol]   10.4 fL         Normal          6.2-12.0        [facility]  
   
                                        Comment on above:   Order Comment: PLEAS  
E ADD ON TO BLOOD IN LAB, THANKS   
   
                                                            Performed By: #### L  
3300.3500 ####  
[facility]  
[address] [place], [address]  
[phone]   
   
                                                    Platelets (Bld)   
[#/Vol]         232 10*3/uL     Normal          150-450         [facility]  
   
                                        Comment on above:   Order Comment: PLEAS  
E ADD ON TO BLOOD IN LAB, THANKS   
   
                                                            Performed By: #### L  
3300.3500 ####  
[facility]  
[address] [place], [address]  
[phone]   
   
                      RBC (Bld) [#/Vol] 4.65 10*6/uL Normal     4.2-5.4    [facility]  
   
                                        Comment on above:   Order Comment: PLEAS  
E ADD ON TO BLOOD IN LAB, THANKS   
   
                                                            Performed By: #### L  
3300.3500 ####  
[facility]  
[address] [place], [address]  
[phone]   
   
                      RDW SD     41.2 fl    Normal     35.1-43.9  [facility]  
   
                                        Comment on above:   Order Comment: PLEAS  
E ADD ON TO BLOOD IN LAB, THANKS   
   
                                                            Performed By: #### L  
3300.3500 ####  
[facility]  
[address] [place], [address]  
[phone]   
   
                      WBC (Bld) [#/Vol] 4.3 10*3/uL Low        4.4-11.0   [facility]  
   
                                        Comment on above:   Order Comment: PLEAS  
E ADD ON TO BLOOD IN LAB, THANKS   
   
                                                            Performed By: #### L  
3300.3500 ####  
[facility]  
[address] [place], [address]  
[phone]   
   
                                                    CRPon 2024   
   
                      C-REACTIVE PROT 5.66 mg/L  High       0.0-3.0    [facility]  
   
                                        Comment on above:   Order Comment: PLEAS  
E ADD ON TO BLOOD IN LAB, THANKS   
   
                                                            Result Comment: C-Re  
active Protein (CRP) provides useful   
information for the  
diagnosis, therapy and monitoring of inflammatory processes  
and associated diseases. For the evaluation of Relative Risk  
for Cardiovascular Disease, a High Sensitivity CRP (HSCRP)  
should be ordered.   
   
                                                            Performed By: #### L  
3300.3500 ####  
[facility]  
[address] [place], [address]  
[phone]   
   
                                                    Comprehensive Metabolic Prof  
ilon 2024   
   
                      Albumin [Mass/Vol] 3.9 g/dL   Normal     3.2-5.0    [facility] [place]  
   
                                        Comment on above:   Order Comment: PLEAS  
E ADD ON TO BLOOD IN LAB, THANKS   
   
                                                            Performed By: #### L  
3300.3500 ####  
[facility]  
[address] [place], [address]  
[phone]   
   
                                                    Albumin/Globulin   
[Mass ratio]    1.1 {ratio}     Normal          0.9-2.4         [facility]  
   
                                        Comment on above:   Order Comment: PLEAS  
E ADD ON TO BLOOD IN LAB, THANKS   
   
                                                            Performed By: #### L  
3300.3500 ####  
[facility]  
[address] [place], [address]  
[phone]   
   
                      ALK P      96 U/L     Normal          [facility]  
   
                                        Comment on above:   Order Comment: PLEAS  
E ADD ON TO BLOOD IN LAB, THANKS   
   
                                                            Performed By: #### L  
3300.3500 ####  
[facility]  
[address] [place], [address]  
[phone]   
   
                                                    ALT [Catalytic   
activity/Vol]   34 U/L          Normal          13-56           [facility]  
   
                                        Comment on above:   Order Comment: PLEAS  
E ADD ON TO BLOOD IN LAB, THANKS   
   
                                                            Performed By: #### L  
3300.3500 ####  
[facility]  
[address] [place], [address]  
[phone]   
   
                                                    AST [Catalytic   
activity/Vol]   19 U/L          Normal          15-37           [facility]  
   
                                        Comment on above:   Order Comment: PLEAS  
E ADD ON TO BLOOD IN LAB, THANKS   
   
                                                            Performed By: #### L  
3300.3500 ####  
[facility]  
[address] [place], [address]  
[phone]   
   
                                                    Bilirubin   
[Mass/Vol]      0.50 mg/dL      Normal          0.20-1.00       [facility]  
   
                                        Comment on above:   Order Comment: PLEAS  
E ADD ON TO BLOOD IN LAB, THANKS   
   
                                                            Result Comment: For   
patients on eltrombopag therapy, use of  
Dimension [place] TBIL is not recommended.   
   
                                                            Performed By: #### L  
3300.3500 ####  
[facility]  
[address] [place], [address]  
[phone]   
   
                      BUN/CRE    18.8 RATIO Normal     10-20      [facility]  
   
                                        Comment on above:   Order Comment: PLEAS  
E ADD ON TO BLOOD IN LAB, THANKS   
   
                                                            Performed By: #### L  
3300.3500 ####  
[facility]  
[address] [place], [address]  
[phone]   
   
                      CA,Total   8.9 mg/dL  Normal     8.5-10.1   [facility]  
   
                                        Comment on above:   Order Comment: PLEAS  
E ADD ON TO BLOOD IN LAB, THANKS   
   
                                                            Performed By: #### L  
3300.3500 ####  
[facility]  
[address] [place], [address]  
[phone]   
   
                                                    Chloride   
[Moles/Vol]     107 mmol/L      Normal                    [facility]  
   
                                        Comment on above:   Order Comment: PLEAS  
E ADD ON TO BLOOD IN LAB, THANKS   
   
                                                            Performed By: #### L  
3300.3500 ####  
[facility]  
[address] [place], [address]  
[phone]   
   
                      CO2 [Moles/Vol] 23.0 mmol/L Normal     21.0-32.0  [facility]  
   
                                        Comment on above:   Order Comment: PLEAS  
E ADD ON TO BLOOD IN LAB, THANKS   
   
                                                            Performed By: #### L  
3300.3500 ####  
[facility]  
[address] [place], [address]  
[phone]   
   
                                                    Creatinine   
[Mass/Vol]      0.80 mg/dL      Normal          0.55-1.02       [facility]  
   
                                        Comment on above:   Order Comment: PLEAS  
E ADD ON TO BLOOD IN LAB, THANKS   
   
                                                            Result Comment: The   
validity of the calculated GFR GFRAA in   
patients over  
70 years has not been determined. Clinical correlation is  
essential.   
   
                                                            Performed By: #### L  
3300.3500 ####  
[facility]  
[address] [place], [address]  
[phone]   
   
                      EST GFR - AA 99 mL/min  Normal     >60        [facility]  
   
                                        Comment on above:   Order Comment: PLEAS  
E ADD ON TO BLOOD IN LAB, THANKS   
   
                                                            Result Comment: Afri  
can American GFR Calc   
   
                                                            Performed By: #### L  
3300.3500 ####  
[facility]  
[address] [place], [address]  
[phone]   
   
                      GAP        9          Normal     5-15       [facility]  
   
                                        Comment on above:   Order Comment: PLEAS  
E ADD ON TO BLOOD IN LAB, THANKS   
   
                                                            Performed By: #### L  
3300.3500 ####  
[facility]  
[address] [place], [address]  
[phone]   
   
                                                    GFR/1.73 sq   
M.predicted among   
non-blacks MDRD   
(S/P/Bld) [Vol   
rate/Area]      82 mL/min/{1.73_m2} Normal          >60             [facility]  
   
                                        Comment on above:   Order Comment: PLEAS  
E ADD ON TO BLOOD IN LAB, THANKS   
   
                                                            Result Comment: Non-  
 GFR Calc   
   
                                                            Performed By: #### L  
3300.3500 ####  
[facility]  
[address] [place], [address]  
[phone]   
   
                                                    Globulin (S)   
[Mass/Vol]      3.5 g/dL        Normal          2.2-4.2         [facility]  
   
                                        Comment on above:   Order Comment: PLEAS  
E ADD ON TO BLOOD IN LAB, THANKS   
   
                                                            Performed By: #### L  
3300.3500 ####  
[facility]  
[address] [place], [address]  
[phone]   
   
                      Glucose [Mass/Vol] 87 mg/dL   Normal          [facility] [place]  
   
                                        Comment on above:   Order Comment: PLEAS  
E ADD ON TO BLOOD IN LAB, THANKS   
   
                                                            Performed By: #### L  
3300.3500 ####  
[facility]  
[address] [place], [address]  
[phone]   
   
                                                    Potassium   
[Moles/Vol]     4.3 mmol/L      Normal          3.5-5.1         [facility]  
   
                                        Comment on above:   Order Comment: PLEAS  
E ADD ON TO BLOOD IN LAB, THANKS   
   
                                                            Performed By: #### L  
3300.3500 ####  
[facility]  
[address] [place], [address]  
[phone]   
   
                      Sodium [Moles/Vol] 139 mmol/L Normal     136-145    [facility] [place]  
   
                                        Comment on above:   Order Comment: PLEAS  
E ADD ON TO BLOOD IN LAB, THANKS   
   
                                                            Performed By: #### L  
3300.3500 ####  
[facility]  
[address] [place], [address]  
[phone]   
   
                      T PROT     7.4 g/dL   Normal     6.4-8.2    [facility]  
   
                                        Comment on above:   Order Comment: PLEAS  
E ADD ON TO BLOOD IN LAB, THANKS   
   
                                                            Performed By: #### L  
3300.3500 ####  
[facility]  
[address] [place], [address]  
[phone]   
   
                                                    Urea nitrogen   
[Mass/Vol]      15 mg/dL        Normal          7-18            [facility]  
   
                                        Comment on above:   Order Comment: PLEAS  
E ADD ON TO BLOOD IN LAB, THANKS   
   
                                                            Performed By: #### L  
3300.3500 ####  
[facility]  
[address] [place], [address]  
[phone]   
   
                                                    Free T3on 2024   
   
                      Free T3 [Mass/Vol] 2.9 pg/mL  Normal     2.18-3.98  [facility] [place]  
   
                                        Comment on above:   Order Comment: Order  
 Date: 24Order Info: 0786-1 -   
CMPOrder   
Info: 83917-4 - LIPIDOrder Info: [id number] - CRPOrder Info: 3051-0 -   
X4REpgyi Info: 3016-3 - TSHOrder Info: 3024-7 - [id number]   
   
                                                            Performed By: #### L  
500.4050, L503.6030, L500.4100, .3500,   
L503.0105, L506.0400, L503.6550, L501.9985, L506.1000, L506.0250,   
L100.0500, L501.2450, L501.9520 ####  
[facility]  
[address] [place], [address]  
[phone]   
   
                                                    Lipid Profileon 2024   
   
                                                    Cholesterol   
[Mass/Vol]      271 mg/dL       High            200             [facility]  
   
                                        Comment on above:   Order Comment: PLEAS  
E ADD ON TO BLOOD IN LAB, THANKS   
   
                                                            Result Comment: <200  
 mg/dL Desirable  
200-240 mg/dL Borderline  
>240 mg/dL High Risk   
   
                                                            Performed By: #### L  
3300.3500 ####  
[facility]  
[address] [place], [address]  
[phone]   
   
                                                    Cholesterol in HDL   
[Mass/Vol]      62 mg/dL        Normal                          [facility]  
   
                                        Comment on above:   Order Comment: PLEAS  
E ADD ON TO BLOOD IN LAB, THANKS   
   
                                                            Result Comment: The   
drugs N-Acetylcysteine and Metamizole may   
falsely  
depress this assay.  
Reference Range  
HDL <40 mg/dL Low HDL Cholesterol  
HDL >or= 60 mg/dL High HDL Cholesterol   
   
                                                            Performed By: #### L  
3300.3500 ####  
[facility]  
[address] [place], [address]  
[phone]   
   
                                                    Cholesterol in LDL   
[Mass/Vol]      181 mg/dL       High            0-130           [facility]  
   
                                        Comment on above:   Order Comment: PLEAS  
E ADD ON TO BLOOD IN LAB, THANKS   
   
                                                            Performed By: #### L  
3300.3500 ####  
[facility]  
[address] [place], [address]  
[phone]   
   
                                                    Cholesterol in   
VLDL [Mass/Vol] 28 mg/dL        Normal          5-40            [facility]  
   
                                        Comment on above:   Order Comment: PLEAS  
E ADD ON TO BLOOD IN LAB, THANKS   
   
                                                            Performed By: #### L  
3300.3500 ####  
[facility]  
[address] [place], [place], [address] [phone]   
   
                                                    Triglyceride   
[Mass/Vol]      138 mg/dL       Normal                          [facility]  
   
                                        Comment on above:   Order Comment: PLEAS  
E ADD ON TO BLOOD IN LAB, THANKS   
   
                                                            Result Comment: The   
drugs N-Acetylcysteine and Metamizole may   
falsely  
depress this assay.  
Serum Triglycerides Reference Interval  
Normal <150 mg/dL  
Borderline high 150 - 199 mg/dL  
High 200 - 499 mg/dL  
Very High > or = 500 mg/dL   
   
                                                            Performed By: #### L  
3300.3500 ####  
[facility]  
[address] [place], [address]  
[phone]   
   
                                                    T4 Free Directon 2024   
   
                      T4 FREE DIRECT 0.89 ng/dL Normal     0.76-1.46  [facility]  
   
                                        Comment on above:   Order Comment: Order  
 Date: 24Order Info: 0786-1 -   
CMPOrder   
Info: 47089-9 - LIPIDOrder Info: [id number] - CRPOrder Info: 3051-0 -   
G1PUaxzj Info: 3016-3 - TSHOrder Info: 3024-7 - L4Qyfxypfildbfr   
   
                                                            Performed By: #### L  
500.4050, L503.6030, L500.4100, .3500,   
L503.0105, L506.0400, L503.6550, L501.9985, L506.1000, L506.0250,   
L100.0500, L501.2450, L501.9520 ####  
[facility]  
[address] [place], [address]  
[phone]   
   
                                                    Thyroid Stim Hormone (TSH)on  
 2024   
   
                      TSH        1.54 uIU/mL Normal     0.358-3.74 [facility]  
   
                                        Comment on above:   Order Comment: Order  
 Date: 24Order Info: 0786-1 -   
CMPOrder   
Info: 31142-6 - LIPIDOrder Info: 23744-9 - CRPOrder Info: 3051-0 -   
N5SZyleb Info: 3016-3 - TSHOrder Info: 3024-7 - S7Pqjdmvmapjpkl   
   
                                                            Performed By: #### L  
500.4050, L503.6030, L500.4100, .3500,   
L503.0105, L506.0400, L503.6550, L501.9985, L506.1000, L506.0250,   
L100.0500, L501.2450, L501.9520 ####  
[facility]  
[address] [place], [place], [address] [phone]   
   
                                                    Vitamin D,25 Hydroxyon    
   
                      Vitamin D 25-OH 48.8 ng/mL Normal                [facility]  
   
                                        Comment on above:   Order Comment: GURMEET MORENO ADD ON TO BLOOD IN LAB, THANKS   
   
                                                            Result Comment: Krystal  
min D 25(OH) Status Range  
Deficiency <20 ng/mL (50nmol/L)  
Insufficiency 20 - 30 ng/mL (50 - 75 nmol/L)  
Sufficiency 30 - 100 ng/mL (75 - 250 nmol/L)  
Toxicity >100 ng/mL (>250 nmol/L)   
   
                                                            Performed By: #### L  
3300.3500 ####  
[facility]  
[address] [place], [place], [address] [phone]   
   
                                                    Surgery Visit Reporton    
   
                                                    Surgery Visit   
Report                                  [facility]  
[address]  
[place] [address]  
[phone]  
  
OFFICE VISIT  
Date of Service: 24  
  
MR#: [id number] Acct:   
[id number]  
  
Name: MAGGY CARPIO JOHN Rep   
#: [id number]  
5  
: 1975 Provider:   
MARIYA bueno  
Age/Sex: 48/F Location:   
[facility]  
  
Status: Signed  
  
Intake  
Vital Signs  
  
  
24  
15:12 24  
15:34  
  
Height 5 ft 10 in 5 ft 10 in  
  
Weight: 235 lb  
  
BMI 33.7  
  
/84 H 127/85 H  
  
Blood Pressure Location Rt   
brachial Rt brachial  
  
Position Sitting Sitting  
  
Respiration 16 18  
  
  
Intake  
Visit Reasons: UPDATE H P  
Chief Complaint: update H P  
 Required: No  
Is patient in pain?: No  
Allergies  
  
No Known Allergies Allergy   
(Verified 24 15:35)  
  
  
  
Medications  
  
cholecalciferol (vitamin D3)   
125 mcg (5,000 unit) capsule   
125 mcg PO DAILY 22   
[History  
Confirmed 24]  
magnesium glycinate 100 mg   
tablet 100 mg PO DAILY   
24 [History Confirmed   
24]  
  
  
  
PFS  
Medical History (Reviewed   
24 @ 15:34 by Kate Potts)  
  
Anxiety  
Hand, foot and mouth disease  
Heartburn  
History of drainage of   
abscess ( 2022)  
History of IBS  
History of pain when walking  
History of steroid therapy  
Migraine headache  
Non-smoker  
Pilonidal abscess of rubi   
cleft  
Wears glasses  
  
  
Surgical History (Reviewed   
24 @ 15:34 by Kate Potts)  
  
History of ankle surgery  
History of arthroscopy of   
left knee  
History of colonoscopy  
History of   
esophagogastroduodenoscopy   
(EGD)  
History of incision and   
drainage  
History of laparoscopic   
cholecystectomy  
History of surgical removal   
of ganglion cyst  
History of umbilical hernia   
repair  
  
  
Family History (Reviewed   
24 @ 15:34 by Kate Potts)  
Father Colon polyps  
Diabetes  
AAA (abdominal aortic   
aneurysm)  
Hypertension  
Mother Colon polyps  
Neuropathy  
  
  
Social History (Reviewed   
24 @ 15:34 by Kate Potts)  
Smoking Status: Never smoker  
  
  
  
HPI  
HPI  
HPI:  
Patient is a 47 y/o F I am   
seeing for an update history   
and physical for an upcoming   
elective right  
possible bilateral inguinal   
hernia repair with mesh.   
Patient denies any recent   
hospitalization or  
illnesses. Patient denies   
denies any medication   
changes. She denies any   
compilations or side effects   
  
previously from anesthesia.   
Patient notes a previous   
history of umbilical hernia   
repair. Patient  
denies any cardiac or   
pulmonary concerns. She does   
not follow with a   
cardiologist. She is not  
currently on any blood   
thinners.  
  
Patient's previous history   
per Dr. Valero:  
48-year-old female. I have   
previously assisted her with   
a pilonidal abscess which   
had quite a  
delayed healing and required   
multiple office visits.She   
now presents with concerns   
regarding a  
possible hernia. The patient   
states that but tickly when   
she is standing that she is   
noted which  
she is suggesting some fatty   
fullness in the right medial   
groin area. She does not   
correlate this  
with any particular injury   
or accident. She likes   
running and is scheduled to   
do a running event in  
May. She is always able to   
get the area to reduce and   
actually can feel the area   
reduced when she  
lies supine.  
  
She has had a previous   
laparoscopic   
cholecystectomy. She has had   
a remote umbilical   
herniorrhaphy  
in childhood. No report of   
mesh in that location.  
  
She has been having some   
right lower quadrant   
discomfort which she always   
attributed to her right  
ovary.  
  
Upon self-examination she is   
wondering whether she has   
weakness on the left as well  
  
  
ROS  
General  
General: No weight change,   
appetite, fatigue, colon   
cancer, breast cancer or   
weakness  
HEENT  
HEENT: No difficulty   
swallowing, eye injury, eye   
surgery, swollen glands or   
hoarseness  
Endo  
Endocrine: No thyroid   
disease, diabetes mellitus,   
thyroid cancer, Hair loss,   
heat intolerance or  
cold intolerance  
Skin  
Skin: No rash or changing   
moles  
Breast  
Breast: No left breast lump,   
right breast lump, nipple   
discharge, breast pain,   
abnormal mammogram,  
abnormal US or breast   
enlargement  
Musc  
Musculoskeletal: No back   
problems, arthritis,   
rheumatoid arthritis, gout   
or joint pain  
Cardio  
Cardiovascular: No murmur,   
pacemaker, heart disease,   
atrial fibrillation, high   
blood pressure, heart  
attack, heart stent,   
palpitations, shortness of   
breat with exertion or chest   
pain  
Psych  
Psychiatric: No depression,   
anxiety or hearing voices  
Resp  
Respiratory: No shortness of   
breath, No sleep apnea, No   
cough, No COPD, No asthma,   
No emphysema and  
No wheezing  
Gastro  
Gastrointestinal: No   
abdominal pain, No nausea or   
vomiting, No diarrhea, No   
constipation, No blood  
in stool, No acid reflux, No   
hemorrhoids, No ulcers, No   
gallbladder problem and No   
black,tarry  
stools  
Hema  
Hematologic: No blood   
thinners, No blood   
disorders, No bleeding, No   
anemia and No blood clots  
Neuro  
Neurologic: No system   
reviewed and no additional   
complaints, except as (more   
content not included)... Normal                                  [facility] 2024   
   
                                        [facility]                HNO ID: [id number]  
Author: COORDINATOR,   
MAMMOGRAPHY, ?  
Service: ?  
Author Type: Physician  
Type: Letter  
Filed: 2024 08:44  
Note Text:  
2024  
PID:  
[id number] Maggy Carpio  
[address]  
[place] [address]  
Dear Ms. Carpio,  
We are pleased to inform you   
that the results of your   
recent breast imaging exam  
on 2024 are normal.  
Your mammogram demonstrates   
that you have dense breast   
tissue, which could hide  
abnormalities. Dense breast   
tissue, in and of itself, is   
a relatively common  
condition. Therefore, this   
information is not provided   
to cause undue concern;  
rather, it is to raise your   
awareness and promote   
discussion with your health  
care provider regarding the   
presence of dense breast   
tissue in addition to  
other risk factors.  
Early detection of cancer is   
very important. We also   
understand recommendations  
regarding breast cancer   
screening are controversial.   
Please discuss with your  
primary care provider which   
strategy is best for you and   
whether a mammogram is  
right for you.  
Your imaging studies and   
report will be kept on file   
at [facility] as part  
of your permanent medical   
record and are available for   
your continuing care.  
Thank you for allowing us to   
help in meeting your health   
care needs.  
Sincerely,  
Dr. Schmidt  
Interpreting Radiologist  
[facility]  
(Normal over 40)    Normal                                  [facility] SCREENING W TOMOon    
   
                                                    [facility] SCREENING W   
CLAUDETTE                                    * * *Final Report* * *  
DATE OF EXAM: 2024   
8:29AM  
[facility] 0582 - [facility] SCREENING W   
CLAUDETTE / ACCESSION # [id number]  
PROCEDURE REASON: Encounter   
for screening mammogram for   
breast cancer  
  
* * * * Physician   
Interpretation * * * *  
RESULT: #[id number] - EMILY   
SCREENING W CLAUDETTE  
BILATERAL DIGITAL SCREENING   
MAMMOGRAM TOMOSYNTHESIS WITH   
CAD: 2024  
HISTORY: Encounter For   
Screening Mammogram For   
Breast Cancer /Screening  
Mammogram with CLAUDETTE -   
patient reports NO breast   
symptoms/most recent  
mammogram done at [facility] /Patient   
has signed release for  
outside images that is   
scanned into [employer].  
RESULT:  
TECHNIQUE: The study was   
acquired using full field   
digital technology and  
interpreted from soft copy.  
Digital Breast Tomosynthesis   
(DBT) images were obtained   
and used to  
assist in the interpretation   
of this examination.  
Current study was also   
evaluated with a Computer   
Aided Detection (CAD).  
Comparison is made to exams   
dated: 2021 mammogram -   
[facility], 2018   
mammogram, and 2017   
mammogram. The  
breasts are heterogeneously   
dense, which may obscure   
small masses.  
No significant masses,   
calcifications, or other   
findings are seen in  
either breast.  
There has been no   
significant interval change.  
IMPRESSION: NEGATIVE  
There is no mammographic   
evidence of malignancy. A 1   
year screening  
mammogram is recommended.  
Dipika Schmidt M.D., lm/ronny:2024 08:44:18  
Imaging Technologist(s):   
RT Rohit(R)(M),   
[facility]  
letter sent: Normal over 40  
Mammogram BI-RADS: 1   
Negative  
Multiple national specialty   
organizations have released   
breast cancer  
screening guidelines for   
women at average risk for   
developing breast  
cancer - guidelines that are   
based on both evidence and   
opinion, yet  
differ on when to start and   
how often to screen for   
breast cancer. With  
representation from Breast   
Imaging, Internal Medicine,   
Women's Health,  
Family Medicine, and   
Medical/Surgical Oncology,   
the [facility] has  
carefully reviewed the data   
and reached the following   
consensus:  
1) All women should engage   
in shared decision-making   
with their providers  
to decide when to start and   
how often to screen;  
2) All women should have the   
opportunity to start   
screening mammography  
at age 40;  
3) For women ages 45-55, we   
recommend annual screening   
mammograms;  
4) For women ages 55 and   
over, we support both the   
transition from an  
annual to a biennial   
interval if this aligns more   
with patient's values  
and preferences, or   
continuation with annual   
screening;  
5) All women should discuss   
with their providers when to   
stop screening  
mammograms.  
: Ronny  
Transcribe Date/Time: 2024 8:06A  
Dictated by: DIPIKA SCHMIDT MD  
This examination was   
interpreted and the report   
reviewed and  
electronically signed by:  
DIPIKA SCHMIDT MD on 2024 8:44AM EST  
[id number] Normal                                  [facility]  
   
                                                    Surgery Visit Reporton    
   
                                                    Surgery Visit   
Report                                  [facility]  
[address]  
[place] [address]  
[phone]  
  
OFFICE VISIT  
Date of Service: 24  
  
MR#: [id number] Acct:   
[id number]  
  
Name: BALAJIMAGGY JOHN Rep   
#: [id number]  
8  
: 1975 Provider:   
Dr. Faye lindsey MD  
Age/Sex: 48/F Location:   
[facility]  
  
Status: Signed  
  
Intake  
Vital Signs  
  
  
24  
15:12  
  
Height 5 ft 10 in  
  
/84 H  
  
Blood Pressure Location Rt   
brachial  
  
Position Sitting  
  
Respiration 16  
  
  
Intake  
Visit Reasons: I D of   
pilonidal cyst  
Chief Complaint: inflamed   
pilonidal cyst  
 Required: No  
Accompanied by:   
Is patient in pain?: No  
Allergies  
  
No Known Allergies Allergy   
(Verified 24 12:24)  
  
  
  
Medications  
  
cholecalciferol (vitamin D3)   
125 mcg (5,000 unit) capsule   
125 mcg PO DAILY 22   
[History  
Confirmed 24]  
magnesium glycinate 100 mg   
tablet 100 mg PO DAILY   
24 [History Confirmed   
24]  
  
  
  
PFSH  
Medical History (Reviewed   
24 @ 12:23 by April Aguilar)  
  
Abscess  
Anxiety  
Hand, foot and mouth disease  
Heartburn  
History of drainage of   
abscess ( 2022)  
History of IBS  
Migraine headache  
Non-smoker  
Pilonidal abscess of rubi   
cleft  
  
  
Surgical History (Reviewed   
24 @ 12:23 by April Aguilar)  
  
History of ankle surgery  
History of arthroscopy of   
left knee  
History of colonoscopy  
History of   
esophagogastroduodenoscopy   
(EGD)  
History of incision and   
drainage  
History of laparoscopic   
cholecystectomy  
History of surgical removal   
of ganglion cyst  
History of umbilical hernia   
repair  
  
  
Family History (Reviewed   
24 @ 12:23 by April Aguilar)  
Father Colon polyps  
Diabetes  
AAA (abdominal aortic   
aneurysm)  
Hypertension  
Mother Colon polyps  
Neuropathy  
  
  
Social History (Reviewed   
24 @ 12:23 by April Aguilar)  
Smoking Status: Never smoker  
  
  
  
HPI  
HPI  
HPI:  
Patient is 1 year out status   
post resolving what appeared   
to be a pilonidal abscess.   
She has just a  
small amount of redness in   
the even more smaller amount   
of little pustule  
  
Exam  
Skin  
Other:  
With nursing present the   
sacrococcygeal area was   
inspected. 3 mm to the right   
of the midline there  
is minimal erythema and a 1   
mm whitish discoloration.  
  
Office Procedures  
Procedure Time Out  
Time Out  
Informed consent given: Yes  
Consent signed: Yes  
Time out checklist: patient,   
procedure, site   
marked/identified,   
positioning of patient,   
supplies  
available, allergies   
confirmed and team agrees on   
procedure  
Time out staff in room: Yes  
Time out verified: Yes  
Time out date: 24  
Time out time: 12:25  
  
  
Assessment and Plan  
Assessment and Plan  
(1) Pilonidal abscess of   
 cleft:  
Status: Acute  
Plan:  
The area of concern was very   
diminutive. I had to use a   
bright light to inspect it.   
We did cleanse  
it with some Betadine use   
some local and I used   
splinter forceps to inspect   
the area a extraordinary  
minimal amount of pus was   
encountered that went from   
the right to the midline. I   
unroofed it for 3  
mm. Did not appear to have a   
depth more than 3 mm. No   
further tracking. Treated   
with silver  
nitrate. I expect it to   
resolve. She will notify me   
if she has any difficulties   
and they may use  
silver nitrate as needed at   
home.  
  
Faye Valero M.D.,   
F.A.C.S.  
  
Coding  
Level of Care Code  
Off vis,est,level 2  
  
Diagnoses  
Pilonidal abscess of rubi   
cleft L05.24 1250  
  
Date ___________   
____________________________  
_______  
  
Faye Valero MD  
  
  
  
  
  
[facility] Signature: Date   
___________   
____________________________  
_______  
  
(if applicable)  
  
CC:                 [facility]                                  [facility]   
   
                                        CN                Office Visit (OBGYWM  
)  
----------------------------  
----------------------------  
------------------------  
MAGGY CARPIO ([id number])   
1975 F  
Date Time Provider   
Department  
3/18/24 9:00 AM SHANI HOUSE OBGYWM  
During your visit today, we   
recorded the following   
information about you:  
Blood pressure Weight Height   
Last Period  
122/78 110.2 kg 1.778 m   
24  
Shani House MD   
3/18/2024 9:54 AM Signed  
Chaperone offered: Patient   
keily Smart is a 48 year old   
 who presents for an   
annual gynecologic exam with  
complaints, irregular   
bleeding and perimenopausal   
symptoms. Not sleeping as  
well- more hot flashes/night   
sweats. More anxiety .   
Working at surgery center.  
Menses: Irregular- sometimes   
Skips 2 months now- 4-5   
flow.  
Contraception: vasectomy  
HPV vaccine: No  
Last Pap: 2022 normal  
HPV: 11/3/2022 negative  
History of abnormal pap: No  
Last mammogram: normal  
Sexually active: Yes  
History of STDS: None  
Patient concerns for STD   
exposure: No.  
Pain with intercourse: No  
Postcoital bleeding: No  
Exercise: Personal training   
with Stephanie Dunnman 2 x week  
Diet: balanced  
OB History  
 T0  L0  
SAB0 IAB0 Ectopic0 Multiple0   
Live Births0  
Gyn History  
LMP: 2024, Having   
periods  
Age at Menarche:  
Age at First Pregnancy:  
Age at Menopause:  
Gyn History Comments:  
Sexual Activity: Yes; Male  
Contraception: Vasectomy  
PAST MEDICAL HISTORY  
Diagnosis Date  
Lactose intolerance  
Raynaud disease  
PAST SURGICAL HISTORY  
Procedure Laterality Date  
ANKLE ARTHROSCOPY Right   
2018  
COLONOSCOPY W/BIOPSY   
SINGLE/MULTIPLE 13  
EGD TRANSORAL BIOPSY   
SINGLE/MULTIPLE 13  
KNEE ARTHROSCOPY Left   
2017  
LAP MARVA W EXPLORATION OF   
CD 2012  
PAST SURGICAL HISTORY OF  
ganglion cyst on wrist x3  
REPAIR UMBILICAL HERNIA 1982  
SKIN BX, 1 LESION 14  
Punch bx right gluteal fold   
skin lesion  
FAMILY HISTORY  
Problem Relation Age of   
Onset  
Diabetes Father  
other (myasthenia gravis)   
Father  
SOCIAL HISTORY  
Social History  
Tobacco Use  
Smoking status: Never  
Smokeless tobacco: Never  
Vaping Use  
Vaping Use: Never used  
Substance Use Topics  
Alcohol use: Yes  
Comment: 3x monthly  
Drug use: Never  
REVIEW OF SYSTEMS  
Abdomen: No abdominal pain,   
nausea, vomiting, diarrhea,   
or constipation. No  
bloating, early satiety,   
indigestion, or increased   
flatulence.  
Bladder: No dysuria, gross   
hematuria, urinary   
frequency, urinary urgency,   
or  
incontinence.  
Breast: No breast lumps,   
nipple d/c, overlying skin   
changes, redness or skin  
retraction.  
Allergies and current   
medication updated:Yes  
EXAM: /78   Ht 5' 10    
(1.78m)   Wt 243 lb   
(110.2kg)   LMP 2024    
BMI 34.87 kg/(m2).  
GENERAL: pleasant,    
female in no apparent   
distress  
HEENT: Normocephalic,   
atraumatic, mucus membranes   
moist, and no lesions  
NECK: Supple, full range of   
motion, no adenopathy, and   
thyroid normal  
DERMATOLOGY: Normal, without   
lesions, non-icteric, and   
non-hirsute  
BREAST: soft, non-tender,   
symmetric, no dominant mass,   
normal nipple-areolar  
complex, no lymphadenopathy,   
and no nipple discharge  
ABDOMEN: soft, non-tender,   
and no masses  
PELVIC: external genitalia   
normal, normal Bartholin's   
glands, urethra, Elizabeth's  
glands, no vulvar lesions,   
no cervical lesions, good   
vaginal support,  
physiologic discharge   
present, normal appearing   
perineal body and perianal  
region  
BIMANUAL: uterus normal   
size, shape and consistency,   
no adnexal masses, and  
non-tender  
RECTOVAGINAL: deferred.  
NEURO: alert and oriented   
x3,exam grossly non-focal  
EXTREMITIES: normal  
ASSESSMENT/PLAN:  
1) Health maintenance:  
Pap/HPV up to date.  
Mammogram ordered.  
Nutrition, exercise and   
routine health maintenance   
exams reviewed.  
2) Contraception: vasectomy.   
Contraceptive options   
reviewed and information  
provided.  
3) STD screening: Declined   
STD check.  
4) Follow up one year or   
sooner as needed  
5) reviewed options for   
perimenopausal symptoms   
hormonal vs non hormonal.  
Shani Walsh MD  
Allergies As of Date:   
2024  
(No Known Allergies)  
Date Reviewed: 2024  
Reviewed by: Shweta Paz MA - Fully Assessed  
Reason for Visit:  
Yearly Exam [187]  
Primary Visit   
Diagnosis:Encounter for   
gynecological examination   
(general)  
(routine) without abnormal   
findings [Z01.419]  
Other Visit   
Diagnosis:Encounter for   
screening mammogram for   
breast cancer  
[Z12.31]  
Order(s):[facility] SCREENING W   
CLAUDETTE [[id number]] Order #:   
[id number] FUTURE  
Prescriptions as of   
2024  
- mecobalamin (B12 ACTIVE   
ORAL)  
- ASHWAGANDHA ROOT EXTRACT   
ORAL  
- vitamin B complex (B   
COMPLEX 1 ORAL)  
- zinc carnosine (ZINLORI)   
75 mg tablet  
- cholecalciferol (VITAMIN   
D-3) 5,000 unit tab  
Take 5,000 Units by mouth   
once daily.  
- Magnesium Oxide 250 mg   
magnesium tab  
Take 240 mg by mouth.  
- zinc sulfate (ZINC-15   
ORAL)  
Take 16 mg by mouth three   
times daily.  
- ascorbic acid, vitamin C,   
(VITAMIN C) 500 mg tablet  
Take 600 mg by (more content   
not included)...    Normal                                  [facility]  
   
                                                    CT PELVIS WO IVCONon   
024   
   
                                        CT PELVIS WO IVCON  * * *Final Report* *  
 *  
DATE OF EXAM: Mar 7 2024   
2:54PM  
[facility] 0556 - CT PELVIS WO   
IVCON / ACCESSION #   
[id number]  
PROCEDURE REASON: pelvis w/o  
  
* * * * Physician   
Interpretation * * * *  
EXAMINATION: CT PELVIS WITH   
IV CONTRAST  
CLINICAL HISTORY: History of   
pilonidal cyst, bilateral   
inguinal hernia  
repair, umbilical hernia   
repair  
TECHNIQUE: CT of the pelvis   
was performed using standard   
technique,  
scanning from just above the   
iliac crests to the   
symphysis pubis.  
Contrast:  
CT Radiation dose:   
Integrated Dose-length   
product (DLP) for this visit   
=  
655 mGy*cm.  
CT Dose Reduction Employed:   
Automated exposure   
control(AEC) and iterative  
recon  
COMPARISON: None.  
RESULT:  
Pelvis: No pelvic ascites or   
mass. 4 cm left adnexal   
simple cyst.  
GI tract: The visualized   
small bowel is unremarkable   
without evidence of  
dilatation or wall   
thickening. The visualized   
colon is unremarkable.  
Normal appendix.  
Lymph nodes: No abdominal or   
pelvic lymphadenopathy.  
Mesentery/Peritoneum: No   
ascites or mass.  
Retroperitoneum: No mass.  
Vasculature: No abdominal   
aortic or iliac artery   
aneurysm.  
Bones/Soft Tissues: Small   
right fat-containing   
inguinal hernia. No soft  
tissue abnormalities along   
the gluteal cleft suggest   
pilonidal cyst.  
IMPRESSION:  
No acute findings in the   
pelvis.  
Small right fat-containing   
inguinal hernia.  
: ALICJA  
Transcribe Date/Time: Mar 9   
2024 3:27P  
Dictated by : TUNDE SIMS MD  
This examination was   
interpreted and the report   
reviewed and  
electronically signed by:  
TUNDE SIMS MD on Mar 9   
2024 3:32PM EST  
[id number] Normal                                  [facility]  
   
                                                    Surgery Visit Reporton    
   
                                                    Surgery Visit   
Report                                  [facility]  
[address] [place] [address]. [address]  
[place], [place] [address] [phone]  
  
OFFICE VISIT  
Date of Service: 24  
  
MR#: [id number] Acct:   
[id number]  
  
Name: MAGGY CARPIO Rep   
#: [id number]  
9  
: 1975 Provider:   
Dr. Faye lindsey MD  
Age/Sex: 48/F Location:   
[facility]  
  
Status: Signed  
  
Intake  
Vital Signs  
  
  
22  
07:10 24  
15:12  
  
Height 5 ft 10 in 5 ft 10 in  
  
/84 H  
  
Blood Pressure Location Rt   
brachial  
  
Position Sitting  
  
Respiration 16  
  
  
Intake  
Visit Reasons: Possible   
hernia  
Chief Complaint: possible   
[facility]  
 Required: No  
Is patient in pain?: Yes   
(Right groin )  
Allergies  
  
No Known Allergies Allergy   
(Verified 24 15:14)  
  
  
  
Medications  
  
cholecalciferol (vitamin D3)   
125 mcg (5,000 unit) capsule   
125 mcg PO DAILY 22   
[History  
Confirmed 24]  
magnesium glycinate 100 mg   
tablet 100 mg PO DAILY   
24 [History Confirmed   
24]  
  
  
  
[facility]  
Medical History (Reviewed   
24 @ 15:12 by Kate Potts)  
  
Abscess  
Anxiety  
Hand, foot and mouth disease  
Heartburn  
History of drainage of   
abscess ( 2022)  
History of IBS  
Migraine headache  
Non-smoker  
Pilonidal abscess of rubi   
cleft  
  
  
Surgical History (Reviewed   
24 @ 15:12 by Kate Potts)  
  
History of ankle surgery  
History of arthroscopy of   
left knee  
History of colonoscopy  
History of   
esophagogastroduodenoscopy   
(EGD)  
History of incision and   
drainage  
History of laparoscopic   
cholecystectomy  
History of surgical removal   
of ganglion cyst  
History of umbilical hernia   
repair  
  
  
Family History (Reviewed   
24 @ 15:12 by Kate Potts)  
Father Colon polyps  
Diabetes  
AAA (abdominal aortic   
aneurysm)  
Hypertension  
Mother Colon polyps  
Neuropathy  
  
  
Social History (Reviewed   
24 @ 15:12 by Kate Potts)  
Smoking Status: Never smoker  
  
  
  
HPI  
HPI  
HPI:  
48-year-old female. I have   
previously assisted her with   
a pilonidal abscess which   
had quite a  
delayed healing and required   
multiple office visits.She   
now presents with concerns   
regarding a  
possible hernia. The patient   
states that but tickly when   
she is standing that she is   
noted which  
she is suggesting some fatty   
fullness in the right medial   
groin area. She does not   
correlate this  
with any particular injury   
or accident. She likes   
running and is scheduled to   
do a running event in  
May. She is always able to   
get the area to reduce and   
actually can feel the area   
reduced when she  
lies supine.  
  
She has had a previous   
laparoscopic   
cholecystectomy. She has had   
a remote umbilical   
herniorrhaphy  
in childhood. No report of   
mesh in that location.  
  
She has been having some   
right lower quadrant   
discomfort which she always   
attributed to her right  
ovary.  
  
Upon self-examination she is   
wondering whether she has   
weakness on the left as well  
  
ROS  
General  
General: No weight change,   
appetite, fatigue, colon   
cancer, breast cancer or   
weakness  
HEENT  
HEENT: No difficulty   
swallowing, eye injury, eye   
surgery, swollen glands or   
hoarseness  
Endo  
Endocrine: No thyroid   
disease, diabetes mellitus,   
thyroid cancer, Hair loss,   
heat intolerance or  
cold intolerance  
Skin  
Skin: No rash or changing   
moles  
Breast  
Breast: No left breast lump,   
right breast lump, nipple   
discharge, breast pain,   
abnormal mammogram,  
abnormal US or breast   
enlargement  
Musc  
Musculoskeletal: No back   
problems, arthritis,   
rheumatoid arthritis, gout   
or joint pain  
Cardio  
Cardiovascular: No murmur,   
pacemaker, heart disease,   
atrial fibrillation, high   
blood pressure, heart  
attack, heart stent,   
palpitations, shortness of   
breat with exertion or chest   
pain  
Psych  
Psychiatric: No depression,   
anxiety or hearing voices  
Resp  
Respiratory: No shortness of   
breath, No sleep apnea, No   
cough, No COPD, No asthma,   
No emphysema and  
No wheezing  
Gastro  
Gastrointestinal: No   
abdominal pain, No nausea or   
vomiting, No diarrhea, No   
constipation, No blood  
in stool, No acid reflux, No   
hemorrhoids, No ulcers, No   
gallbladder problem and No   
black,tarry  
stools  
Hema  
Hematologic: No blood   
thinners, No blood   
disorders, No bleeding, No   
anemia and No blood clots  
Neuro  
Neurologic: No system   
reviewed and no additional   
complaints, except as   
documented, No as per HPI,   
No   
abnormal gait, No abnormal   
hearing, No abnormal   
movements, No abnormal   
speech, No behavioral  
changes, No burning   
sensations, No confusion, No   
convulsions, No   
disequilibrium, No   
dizziness, No  
localized weakness, No   
frequent falls, No   
headache(s), No lack of   
coordination, No loss of   
vision,  
No memory loss, No numbness,   
No other visual   
disturbances, No radicular   
pain, No restless legs, No  
sensory deficit, No syncope,   
No tingling, No tremor(s),   
No weakness and No other  
  
Exam  
Const  
General: cooperative,   
healthy appearing,   
comfortable and no acute   
distress  
[facility]  
Head: normal to inspection  
Eyes  
General: appearance normal,   
both eyes (more content not   
included)...        Normal                                  [facility]  
   
                                                    T3 REVERSE (CCL)on   
3   
   
                      Reverse T3 10.0 ng/dL Normal     9.0-27.0   [facility]  
   
                                        Comment on above:   Result Comment: INTE  
RPRETIVE INFORMATION: Triiodothyronine,   
Reverse   
- LC-MS/MS  
This test was developed and its performance characteristics  
determined by [facility]. It has not been cleared or  
approved by the US Food and Drug Administration. This test was  
performed in a CLIA certified laboratory and is intended for  
clinical purposes.  
Performed By: [facility]  
[address] [place] [address]  
: Mando Disla MD, PhD  
[place] [id number]  
Domingo Mcrae III, M.D.  
[id number]  
[phone]   
   
                                                            Performed By: #### 2  
[id number] ####  
[address]   
[id number]   
   
                                                    T3, FREE [CCL]on 2023   
   
                      Free T3 [Mass/Vol] 3.2 pg/mL  Normal     2.3-4.1    [facility]  
   
                                        Comment on above:   Result Comment: [place] [id number]  
Domingo Mcrae III, M.D.  
[id number]  
[phone]   
   
                                                            Performed By: #### 2  
[id number] ####  
[address]   
[id number]   
   
                                                    THYROID PEROXIDASE AB [CCL]o  
n 2023   
   
                      TPO Antibody <3.0       Normal     <5.6       [facility]  
   
                                        Comment on above:   Result Comment: Thyr  
oid Peroxidase Antibody test is used as an  
 aid   
in diagnosis of  
autoimmune thyroid disease. Clinical correlation is required.  
[address] [address]  
Domingo Mcrae III, M.D.  
[id number]  
[phone]   
   
                                                            Performed By: #### 2  
[id number] ####  
[address]   
[id number]   
   
                                                    CBC + DIFFon 02-   
   
                          Baso #       0.00 x10EE3/UL Normal       0.00 -   
0.10                                    [facility]  
   
                                        Comment on above:   Performed By: #### 2  
[id number] ####  
[facility],[address] [place] [address]   
[id number]   
   
                                                    Basophils/100 WBC   
(Bld)           0.5 %           Normal          0.0 - 2.0       [facility]  
   
                                        Comment on above:   Performed By: #### 2  
[id number] ####  
[facility],[address] [place], [place] [id number]   
   
                      CBC + DIFF            Normal                [facility]  
   
                                        Comment on above:   Result Comment: CBC-  
COMPLETE BLOOD COUNT   
   
                                                            Performed By: #### 2  
[id number] ####  
[facility],[address] [place] [address]   
   
                          EO #         0.10 x10EE3/UL Normal       0.00 -   
0.50                                    [facility]  
   
                                        Comment on above:   Performed By: #### 2  
[id number] ####  
[address]   
[id number]   
   
                                                    Eosinophils/100   
WBC (Bld)       2.5 %           Normal          0.0 - 7.0       [facility]  
   
                                        Comment on above:   Performed By: #### 2  
[id number] ####  
[facility],[address] [place] [address]   
   
                                                    Erythrocyte   
distribution width   
(RBC) [Ratio]       13.2 %              Normal              12.0 -   
15.6                                    [facility]  
   
                                        Comment on above:   Performed By: #### 2  
[id number] ####  
[facility],[address] [place] [address]   
   
                                                    Hematocrit (Bld)   
[Volume fraction]   42.7 %              Normal              34.0 -   
46.0                                    [facility]  
   
                                        Comment on above:   Performed By: #### 2  
[id number] ####  
[facility],[address] [place] [address]   
[id number]   
   
                                                    Hemoglobin (Bld)   
[Mass/Vol]          14.4 g/dL           Normal              12.0 -   
16.0                                    [facility]  
   
                                        Comment on above:   Performed By: #### 2  
45746 ####  
[facility],[address] [place], [place] [id number]   
   
                          Lymph #      2.10 x10EE3/UL Normal       0.80 -   
2.80                                    [facility]  
   
                                        Comment on above:   Performed By: #### 2  
79957 ####  
[facility],[address] [place] [address]   
   
                                                    Lymphocytes/100   
WBC (Bld)           34.9 %              Normal              20.0 -   
45.0                                    [facility]  
   
                                        Comment on above:   Performed By: #### 2  
46920 ####  
[facility],[address] [place] [address]   
   
                      MANUAL DIFF N/A        Normal                [facility]  
   
                                        Comment on above:   Performed By: #### 2  
28660 ####  
[facility],[address] [place] [address]   
   
                                                    MCH (RBC) [Entitic   
mass]           32 pg           Normal          27 - 33         [facility]  
   
                                        Comment on above:   Performed By: #### 2  
61840 ####  
[place] [address]   
   
                      MCHC       34 X10 3   Normal     32 - 36    [facility]  
   
                                        Comment on above:   Performed By: #### 2  
31853 ####  
[facility],[address] [place] [address]   
   
                                                    MCV (RBC) [Entitic   
vol]            95 fL           Normal          80 - 99         [facility]  
   
                                        Comment on above:   Performed By: #### 2  
48895 ####  
[facility],[address] [place] [address]   
   
                          Mono #       0.50 x10EE3/UL Normal       0.20 -   
1.00                                    [facility]  
   
                                        Comment on above:   Performed By: #### 2  
89525 ####  
[place] [id number]   
   
                      MONOS %    8.8 %      Normal     0.0 - 10.0 [facility]  
   
                                        Comment on above:   Performed By: #### 2  
59932 ####  
[facility],[address] [place], [place] [id number]   
   
                                                    Morphology Jean-Claude   
(Bld) [Interp]  N/A             Normal                          [facility]  
   
                                        Comment on above:   Result Comment: {CD]  
   
   
                                                            Performed By: #### 2  
54978 ####  
[facility],[address] [place] [address]   
[address]   
   
                          Neut #       3.10 x10EE3/UL Normal       1.50 -   
7.10                                    [facility]  
   
                                        Comment on above:   Performed By: #### 2  
23729 ####  
[facility],[address] [place] [address]   
[address]   
   
                                                    Neutrophils/100   
WBC (Bld)           53.3 %              Normal              46.0 -   
76.0                                    [facility]  
   
                                        Comment on above:   Performed By: #### 2  
13324 ####  
[facility],[address] [place] [address]   
[address]   
   
                      PLATELET   263 x10EE3/UL Normal     150 - 450  [facility]  
   
                                        Comment on above:   Performed By: #### 2  
98508 ####  
[address]   
[address]   
   
                                                    Platelet mean   
volume (Bld)   
[Entitic vol]   8.8 fL          Normal          6.6 - 10.5      [facility]  
   
                                        Comment on above:   Result Comment: AUTO  
MATED DIFFERENTIAL   
   
                                                            Performed By: #### 2  
87705 ####  
[facility],[address] [place] [address]   
[address]   
   
                          RBC          4.51 x 10EE6/UL Normal       4.10 -   
5.30                                    [facility]  
   
                                        Comment on above:   Performed By: #### 2  
38697 ####  
[facility],[address] [place] [address]   
[address]   
   
                      WBC        5.9 x 10EE3/UL Normal     4.5 - 10.8 [facility]  
   
                                        Comment on above:   Performed By: #### 2  
57355 ####  
[facility],[address] [place] [address]   
[address]   
   
                                                    CMP with eGFRon 02-   
   
                      AGE        47 years   Normal                [facility]  
   
                                        Comment on above:   Performed By: #### 2  
42337 ####  
[facility],[address] [place] [address]   
[address]   
   
                      Albumin [Mass/Vol] 3.9 g/dL   Normal     3.4 - 5.0  [facility]  
   
                                        Comment on above:   Performed By: #### 2  
01820 ####  
[facility],[address] [place] [address]   
[address]   
   
                                                    Albumin/Globulin   
[Mass ratio]    1.1 {ratio}     Normal          0.9 - 1.6       [facility]  
   
                                        Comment on above:   Performed By: #### 2  
18952 ####  
[facility],[address] [place] [address]   
[address]   
   
                      ALK PHOS   96 U/L     Normal     46 - 116   [facility]  
   
                                        Comment on above:   Performed By: #### 2  
29404 ####  
[facility],[address] [place] [address]   
[address]   
   
                                                    ALT [Catalytic   
activity/Vol]   31 U/L          Normal          14 - 59         [facility]  
   
                                        Comment on above:   Performed By: #### 2  
67303 ####  
[facility],[address] [place] [address]   
[address]   
   
                                                    Anion gap   
[Moles/Vol]     14 mmol/L       Normal          10 - 20         [facility]  
   
                                        Comment on above:   Performed By: #### 2  
72239 ####  
[facility],[address] [place] [address]   
[address]   
   
                                                    AST [Catalytic   
activity/Vol]   23 U/L          Normal          13 - 39         [facility]  
   
                                        Comment on above:   Performed By: #### 2  
70979 ####  
[facility],[address] [place] [address]   
[address]   
   
                      B/C RATIO  24 ratio   Normal     0 - 30     [facility]  
   
                                        Comment on above:   Performed By: #### 2  
42326 ####  
[facility],[address] [place] [address]   
[address]   
   
                                                    Bilirubin   
[Mass/Vol]      0.3 mg/dL       Normal          0.2 - 1.0       [facility]  
   
                                        Comment on above:   Performed By: #### 2  
74317 ####  
[facility],[address] [place] [address]   
[address]   
   
                      Calcium [Mass/Vol] 9.2 mg/dL  Normal     8.5 - 10.1 [facility]  
   
                                        Comment on above:   Performed By: #### 2  
62614 ####  
[facility],[address] [place], [place] [id number]   
   
                                                    Chloride   
[Moles/Vol]     103 mmol/L      Normal          98 - 107        [facility]  
   
                                        Comment on above:   Performed By: #### 2  
20205 ####  
[facility],[address] [place] [address]   
[address]   
   
                      CMP with eGFR            Normal                [facility]  
   
                                        Comment on above:   Result Comment: COMP  
REHENSIVE METABOLIC PANEL   
   
                                                            Performed By: #### 2  
02919 ####  
[facility],[address] [place] [address]   
   
                          CO2 [Moles/Vol] 28.6 mmol/L  Normal       21.0 -   
32.0                                    [facility]  
   
                                        Comment on above:   Performed By: #### 2  
17338 ####  
[facility],[address] [place], [place] [id number]   
   
                                                    Creatinine   
[Mass/Vol]          0.75 mg/dL          Normal              0.55 -   
1.02                                    [facility]  
   
                                        Comment on above:   Performed By: #### 2  
65707 ####  
[facility],[address] [place] [address]   
[address]   
   
                                                    GFR/1.73 sq   
M.predicted among   
non-blacks MDRD   
(S/P/Bld) [Vol   
rate/Area]      mL/min/{1.73_m2} Normal          60 - 999        [facility]  
   
                                        Comment on above:   Performed By: #### 2  
61388 ####  
[facility],[address] [place], [place] [id number]   
   
                                                            Result Comment: ACCO  
RDING TO THE NATIONAL KIDNEY DISEASE EDUCATION   
PROGRAM(NKDE), A NORMAL eGFR  
IS A VALUE GREATER THAN OR EQUAL TO 60 ML/MIN/1.73 SQ METERS.  
CHRONIC KIDNEY DISEASE: <60mL/MIN/1.73 SQ METERS  
KIDNEY FAILURE: <15mL/MIN/1.73 SQ METERS  
THIS TEST SHOULD ONLY BE USED FOR PATIENTS 18 YEARS OF AGE AND   
OLDER.   
   
                                                    Globulin (S)   
[Mass/Vol]      3.5 g/dL        Normal          1.5 - 3.8       [facility]  
   
                                        Comment on above:   Performed By: #### 2  
76992 ####  
[facility],[address] [place] [address]   
[address]   
   
                      Glucose [Mass/Vol] 81 mg/dL   Normal     74 - 106   [facility]  
   
                                        Comment on above:   Performed By: #### 2  
25634 ####  
[facility],[address] [place] [address]   
[address]   
   
                                                    Potassium   
[Moles/Vol]     4.2 mmol/L      Normal          3.5 - 5.1       [facility]  
   
                                        Comment on above:   Performed By: #### 2  
81760 ####  
[facility],[address] [place] [address]   
[address]   
   
                      Protein [Mass/Vol] 7.4 g/dL   Normal     6.4 - 8.2  [facility]  
   
                                        Comment on above:   Performed By: #### 2  
48418 ####  
[facility],[address] [place] [address]   
[address]   
   
                      Sodium [Moles/Vol] 141 mmol/L Normal     136 - 145  [facility]  
   
                                        Comment on above:   Performed By: #### 2  
21238 ####  
[facility],[address] [place] [address]   
[address]   
   
                                                    Urea nitrogen   
[Mass/Vol]      18 mg/dL        Normal          7 - 18          [facility]  
   
                                        Comment on above:   Performed By: #### 2  
55471 ####  
[facility],[address] [place] [address]   
[address]   
   
                                                    T4-FREE (FREE THYROXINE)on 0  
2-   
   
                          Free T4 [Mass/Vol] 0.79 ng/dL   Normal       0.76 -   
1.46                                    [facility]  
   
                                        Comment on above:   Result Comment: ***P  
otential of falsely elevated results when   
biotin concentrations are > 10  
ng/mL.***   
   
                                                            Performed By: #### 2  
06832 ####  
[facility],[address] [place] [address]   
[address]   
   
                                                    TSHon 02-   
   
                          TSH Qn       2.07 m[IU]/L Normal       0.35 -   
3.74                                    [facility]  
   
                                        Comment on above:   Performed By: #### 2  
42510 ####  
[facility],[address] [place] [address]   
[address]   
   
                                                    3D MAMM DIGITAL LT SPOT VIEW  
Son 2023   
   
                                                    3D MAMM DIGITAL LT   
SPOT VIEWS                              [address] [address]  
Ph: [phone] Fax:   
[phone]  
Patient: MAGGY CARPIO  
Phone#: [phone] :   
1975 Age: 47 Gender: F  
MRN: [id number] Pt. Type: Out  
Account: [id number] Location:  
Ordering: SHANI SANCHEZ   
Exam Date: 2023/16:06  
Family Phys: Charge Code:   
[id number]  
Physician: Hinds Order #:   
[id number]  
Dose#:  
  
  
  
PROCEDURE: LEFT BREAST   
SPOT/MAG TOMOSYNTHESIS   
MAMMOGRAM WITH CAD  
  
COMPARISON: [facility], 3D BILAT   
SCREEN, 2023, 14:57.  
  
INDICATIONS: Abnormal   
mammogram.  
  
BREAST COMPOSITION:   
Heterogeneously dense, which   
may obscure small masses.  
  
FINDINGS:  
DIAGNOSTIC CATEGORY   
0--INCOMPLETE ASSESSMENT:   
NEED ADDITIONAL IMAGING  
EVALUATION.  
  
LEFT BREAST: FOCAL ASYMMETRY   
(finding without convex   
borders usually visible on   
two  
orthogonal views),   
characterized by vaguely   
marginated slight increase   
in density  
corresponding to the   
previously identified focus   
at the inferior medial left   
breast. Further  
evaluation by ultrasound   
will be performed.  
  
RECOMMENDATIONS:  
ULTRASOUND: LEFT BREAST   
--same day ultrasound and   
provide an additional   
report.  
  
PLEASE NOTE: A NORMAL   
MAMMOGRAM DOES NOT EXCLUDE   
THE POSSIBILITY OF BREAST   
CANCER.  
A CLINICALLY SUSPICIOUS   
PALPABLE LUMP SHOULD BE   
BIOPSIED.  
  
THIS FACILITY UTILIZES A   
REMINDER SYSTEM TO ENSURE   
THAT ALL PATIENTS RECEIVE   
REMINDER  
LETTERS FOR APPOINTMENTS.   
THIS INCLUDES REMINDERS FOR   
ROUTINE MAMMOGRAMS,  
DIAGNOSITC MAMMOGRAMS, OR   
OTHER BREAST IMAGING   
INTERVENTIONS WHEN   
APPROPRIATE.  
THIS PATIENT WILL BE PLACED   
IN THE APPROPRIATE REMINDER   
SYSTEM.  
  
  
Dictated by: Peg Kc MD on 2023 at 17:32  
Approved by: Peg Kc MD on 2023 at 17:36 Normal                                  [facility]  
   
                                                    US BREAST LT UNILATERAL COMP  
LETEon 2023   
   
                                                    US BREAST LT   
UNILATERAL   
COMPLETE                                [address] [address]  
Ph: [phone] Fax:   
[phone]  
Patient: MAGGY CARPIO  
Phone#: [phone] :   
1975 Age: 47 Gender: F  
MRN: [id number] Pt. Type: Out  
Account: [id number] Location:  
Ordering: [employer]   
Exam Date: 2023/16:22  
Family Phys: Charge Code:   
[id number]  
Physician: Hinds Order #:   
[id number]  
Dose#:  
  
  
  
PROCEDURE: ULTRASOUND BREAST   
LT  
  
COMPARISON: None.  
  
INDICATIONS: Abnormal   
mammogram.  
  
TECHNIQUE: Breast ultrasound   
was performed, with   
evaluation focusing on all   
four quadrants.  
  
FINDINGS:  
DIAGNOSTIC CATEGORY   
3--PROBABLY BENIGN FINDING.   
THE FOLLOWING FINDING(S) HAS  
A HIGH PROBABILITY OF A   
BENIGN ETIOLOGY:  
  
LEFT BREAST: Complicated,   
likely benign cyst,   
hypoechoic echotexture,   
mid-breast depth, 8  
o'clock position, and 6 x 7   
x 3 mm size. Short-term   
follow-up evaluation by  
ultrasound is   
recommended.The finding   
correlates with the   
mammogram  
finding.  
  
RECOMMENDATIONS:  
SHORT TERM FOLLOW-UP   
ULTRASOUND LEFT BREAST IN 6   
MONTHS.  
  
PLEASE NOTE: A NORMAL   
MAMMOGRAM DOES NOT EXCLUDE   
THE POSSIBILITY OF BREAST   
CANCER.  
A CLINICALLY SUSPICIOUS   
PALPABLE LUMP SHOULD BE   
BIOPSIED.  
  
  
Dictated by: Peg Kc MD on 2023 at 17:36  
Approved by: Peg Kc MD on 2023 at 17:39 Normal                                  [facility]  
   
                                                    3D MAMM BILAT SCREENon    
   
                                                    3D MAMM BILAT   
SCREEN                                  [address] [address]  
Ph: [phone] Fax:   
[phone]  
Patient: MAGGY CARPIO  
Phone#: [phone] :   
1975 Age: 47 Gender: F  
MRN: [id number] Pt. Type: Out  
Account: [id number] Location:  
Ordering: [employer]. SANCHEZ   
Exam Date: 2023/14:57  
Family Phys: JOSE SLOAN Charge Code: [id number]  
Physician: Hinds Order #:   
[id number]  
Dose#:  
  
  
  
PROCEDURE: BILATERAL   
SCREENING BREAST   
TOMOSYNTHESIS MAMMOGRAM WITH   
CAD  
  
COMPARISON: Outside   
institution 10/7/2014,   
2017 and 2018.  
  
INDICATIONS: Screening.  
  
BREAST COMPOSITION:   
Heterogeneously dense, which   
may obscure small masses.  
  
FINDINGS:  
DIAGNOSTIC CATEGORY   
0--INCOMPLETE ASSESSMENT:   
NEED ADDITIONAL IMAGING  
EVALUATION.  
  
RIGHT BREAST: No significant   
suspicious finding. No   
significant change has   
occurred.  
  
LEFT BREAST: ASYMMETRY   
visible on only the oblique   
view, located in the   
inferior breast, at  
the mid-breast depth, with   
size of approximately 10x11   
mm. This may correspond to   
an  
asymmetry in the medial   
breast measuring 0.6 cm.  
  
RECOMMENDATIONS:  
ADDITIONAL MAMMOGRAPHIC   
VIEWS REQUIRED: LEFT BREAST   
--We will call the patient  
back for additional views   
and issue an additional   
report.  
  
PLEASE NOTE: A NORMAL   
MAMMOGRAM DOES NOT EXCLUDE   
THE POSSIBILITY OF BREAST   
CANCER.  
A CLINICALLY SUSPICIOUS   
PALPABLE LUMP SHOULD BE   
BIOPSIED.  
  
THIS FACILITY UTILIZES A   
REMINDER SYSTEM TO ENSURE   
THAT ALL PATIENTS RECEIVE   
REMINDER  
LETTERS FOR APPOINTMENTS.   
THIS INCLUDES REMINDERS FOR   
ROUTINE MAMMOGRAMS,  
DIAGNOSITC MAMMOGRAMS, OR   
OTHER BREAST IMAGING   
INTERVENTIONS WHEN   
APPROPRIATE.  
THIS PATIENT WILL BE PLACED   
IN THE APPROPRIATE REMINDER   
SYSTEM.  
  
  
Dictated by: Nicci Owens MD   
on 2023 at 14:33  
Approved by: Nicci Owens MD   
on 2023 at 14:40 Normal                                  [facility]  
   
                                                    Laboratory - Chemistry and C  
hemistry - challengeOrdered By: Dr. Joiner on   
2022   
   
                                                    HCG (pregnancy   
test) Ql (U)    Negative                                        [facility]  
   
                                        Comment on above:   Very dilute urine sp  
ecimens, as indicated by a low   
specificgravity,   
may not contain representative levels of hCG. If pregnancy is still   
suspected, a first morning urinespecimen should be collected 48   
hours later and tested.   
   
                                                    Bacteria identified Anaer cx  
 Nom (Unsp spec)Ordered By: Dr. Valero on 2022   
   
                      Anaerobic Culture Actinomyces species                       
  [facility]  
   
                                                    Bacteria identified Cx Nom (  
Wound)Ordered By: Dr. Valero on 2022   
   
                      Wound Culture Actinomyces canis                       [facility]  
   
                                                    Gram stain for investigation  
 of transfusion reactionOrdered By: Dr. Valero on   
2022   
   
                                                    Microscopic   
observation Gram   
stain Nom (Unsp   
spec)                                                           [facility]  
   
                                                    PELVIC US WHIon 2022   
   
                                                                  [facility] with eGFRon 2022   
   
                      AGE        47 years   Normal                [facility]  
   
                                        Comment on above:   Performed By: #### 2  
[id number] ####  
[facility],[address] [place] [address]   
[address]   
   
                                                    Anion gap   
[Moles/Vol]     8 mmol/L        Low             10 - 20         [facility]  
   
                                        Comment on above:   Performed By: #### 2  
27463 ####  
[facility],[address] [place] [address]   
[address]   
   
                      BMP with eGFR            Normal                [facility]  
   
                                        Comment on above:   Result Comment: BASI  
C METABOLIC PANEL   
   
                                                            Performed By: #### 2  
[id number] ####  
[facility],[address] [place] [address]   
[id number]   
   
                      Calcium [Mass/Vol] 8.9 mg/dL  Normal     8.5 - 10.1 [facility]  
   
                                        Comment on above:   Performed By: #### 2  
83188 ####  
[facility],[address] [place] [address]   
[address]   
   
                                                    Chloride   
[Moles/Vol]     101 mmol/L      Normal          98 - 107        [facility]  
   
                                        Comment on above:   Performed By: #### 2  
06833 ####  
[facility],[address] [place] [address]   
[address]   
   
                          CO2 [Moles/Vol] 29.7 mmol/L  Normal       21.0 -   
32.0                                    [facility]  
   
                                        Comment on above:   Performed By: #### 2  
24624 ####  
[facility],[address] [place] [address]   
[address]   
   
                                                    Creatinine   
[Mass/Vol]          0.85 mg/dL          Normal              0.55 -   
1.02                                    [facility]  
   
                                        Comment on above:   Performed By: #### 2  
[id number] ####  
[facility],[address] [place] [address]   
[address]   
   
                                                    GFR/1.73 sq   
M.predicted among   
non-blacks MDRD   
(S/P/Bld) [Vol   
rate/Area]      mL/min/{1.73_m2} Normal          60 - 999        [facility]  
   
                                        Comment on above:   Performed By: #### 2  
95453 ####  
[facility],[address] [place] [address]   
   
                                                            Result Comment: ACCO  
RDING TO THE NATIONAL KIDNEY DISEASE EDUCATION   
PROGRAM(NKDE), A NORMAL eGFR  
IS A VALUE GREATER THAN OR EQUAL TO 60 ML/MIN/1.73 SQ METERS.  
CHRONIC KIDNEY DISEASE: <60mL/MIN/1.73 SQ METERS  
KIDNEY FAILURE: <15mL/MIN/1.73 SQ METERS  
THIS TEST SHOULD ONLY BE USED FOR PATIENTS 18 YEARS OF AGE AND   
OLDER.   
   
                      Glucose [Mass/Vol] 81 mg/dL   Normal     74 - 106   [facility]  
   
                                        Comment on above:   Performed By: #### 2  
[id number] ####  
[place] [address]   
   
                                                    Potassium   
[Moles/Vol]     4.1 mmol/L      Normal          3.5 - 5.1       [facility]  
   
                                        Comment on above:   Performed By: #### 2  
58724 ####  
[place] [address]   
   
                      Sodium [Moles/Vol] 135 mmol/L Low        136 - 145  [facility]  
   
                                        Comment on above:   Performed By: #### 2  
71491 ####  
[place] [address]   
   
                                                    Urea nitrogen   
[Mass/Vol]      17 mg/dL        Normal          7 - 18          [facility]  
   
                                        Comment on above:   Performed By: #### 2  
75117 ####  
[place] [id number]   
   
                                                    CBC + DIFFon 2022   
   
                          Baso #       0.00 x10EE3/UL Normal       0.00 -   
0.10                                    [facility]  
   
                                        Comment on above:   Performed By: #### 2  
70636 ####  
[place] [id number]   
   
                                                    Basophils/100 WBC   
(Bld)           0.5 %           Normal          0.0 - 2.0       [facility]  
   
                                        Comment on above:   Performed By: #### 2  
70336 ####  
[place] [address]   
   
                      CBC + DIFF            Normal                [facility]  
   
                                        Comment on above:   Result Comment: CBC-  
COMPLETE BLOOD COUNT   
   
                                                            Performed By: #### 2  
63232 ####  
[facility],[address] [place] [address]   
[id number]   
   
                          EO #         0.20 x10EE3/UL Normal       0.00 -   
0.50                                    [facility]  
   
                                        Comment on above:   Performed By: #### 2  
77720 ####  
[facility],[address] [place], [place] [id number]   
   
                                                    Eosinophils/100   
WBC (Bld)       2.7 %           Normal          0.0 - 7.0       [facility]  
   
                                        Comment on above:   Performed By: #### 2  
25446 ####  
[facility],[address] [place], [place] [id number]   
   
                                                    Erythrocyte   
distribution width   
(RBC) [Ratio]       12.9 %              Normal              12.0 -   
15.6                                    [facility]  
   
                                        Comment on above:   Performed By: #### 2  
44502 ####  
[facility],[address] [place], [place] [id number]   
   
                                                    Hematocrit (Bld)   
[Volume fraction]   41.9 %              Normal              34.0 -   
46.0                                    [facility]  
   
                                        Comment on above:   Performed By: #### 2  
74762 ####  
[facility],[address] [place] [address]   
[id number]   
   
                                                    Hemoglobin (Bld)   
[Mass/Vol]          14.1 g/dL           Normal              12.0 -   
16.0                                    [facility]  
   
                                        Comment on above:   Performed By: #### 2  
51230 ####  
[facility],[address] [place] [address]   
[address]   
   
                          Lymph #      2.90 x10EE3/UL High         0.80 -   
2.80                                    [facility]  
   
                                        Comment on above:   Performed By: #### 2  
29545 ####  
[facility],[address] [place] [address]   
[address]   
   
                                                    Lymphocytes/100   
WBC (Bld)           34.3 %              Normal              20.0 -   
45.0                                    [facility]  
   
                                        Comment on above:   Performed By: #### 2  
35063 ####  
[facility],[address] [place] [address]   
   
                      MANUAL DIFF N/A        Normal                [facility]  
   
                                        Comment on above:   Performed By: #### 2  
42646 ####  
[facility],[address] [place] [address]   
   
                                                    MCH (RBC) [Entitic   
mass]           32 pg           Normal          27 - 33         [facility]  
   
                                        Comment on above:   Performed By: #### 2  
58229 ####  
[facility],[address] [place] [address]   
   
                      MCHC       34 X10 3   Normal     32 - 36    [facility]  
   
                                        Comment on above:   Performed By: #### 2  
65973 ####  
[facility],[address] [place] [address]   
   
                                                    MCV (RBC) [Entitic   
vol]            94 fL           Normal          80 - 99         [facility]  
   
                                        Comment on above:   Performed By: #### 2  
71493 ####  
[facility],[address] [place] [address]   
   
                          Mono #       0.80 x10EE3/UL Normal       0.20 -   
1.00                                    [facility]  
   
                                        Comment on above:   Performed By: #### 2  
39858 ####  
[facility],[address] [place] [address]   
   
                      MONOS %    9.3 %      Normal     0.0 - 10.0 [facility]  
   
                                        Comment on above:   Performed By: #### 2  
08451 ####  
[facility],[address] [place] [address]   
   
                                                    Morphology Jean-Claude   
(Bld) [Interp]  N/A             Normal                          [facility]  
   
                                        Comment on above:   Result Comment: {CD]  
   
   
                                                            Performed By: #### 2  
22437 ####  
[facility],[address] [place] [address]   
   
                          Neut #       4.40 x10EE3/UL Normal       1.50 -   
7.10                                    [facility]  
   
                                        Comment on above:   Performed By: #### 2  
43404 ####  
[facility],[address] [place] [address]   
[address]   
   
                                                    Neutrophils/100   
WBC (Bld)           53.2 %              Normal              46.0 -   
76.0                                    [facility]  
   
                                        Comment on above:   Performed By: #### 2  
14068 ####  
[facility],[address] [place] [address]   
[address]   
   
                      PLATELET   245 x10EE3/UL Normal     150 - 450  [facility]  
   
                                        Comment on above:   Performed By: #### 2  
42397 ####  
[facility],[address] [place] [address]   
[address]   
   
                                                    Platelet mean   
volume (Bld)   
[Entitic vol]   8.7 fL          Normal          6.6 - 10.5      [facility]  
   
                                        Comment on above:   Result Comment: AUTO  
MATED DIFFERENTIAL   
   
                                                            Performed By: #### 2  
18985 ####  
[facility],[address] [place] [address]   
[address]   
   
                          RBC          4.47 x 10EE6/UL Normal       4.10 -   
5.30                                    [facility]  
   
                                        Comment on above:   Performed By: #### 2  
71640 ####  
[facility],[address] [place] [address]   
[address]   
   
                      WBC        8.4 x 10EE3/UL Normal     4.5 - 10.8 [facility]  
   
                                        Comment on above:   Performed By: #### 2  
28344 ####  
[facility],[address] [place] [address]   
[address]   
   
                                                    LIPID PROFILEon 2022   
   
                                                    Cholesterol   
[Mass/Vol]      200 mg/dL       Normal          0 - 240         [facility]  
   
                                        Comment on above:   Performed By: #### 2  
12024 ####  
[facility],[address] [place] [address]   
[address]   
   
                                                    Cholesterol in HDL   
[Mass/Vol]      61 mg/dL        High            40 - 60         [facility]  
   
                                        Comment on above:   Performed By: #### 2  
11313 ####  
[facility],[address] [place] [address]   
[address]   
   
                                                    Cholesterol in LDL   
[Mass/Vol]      126 mg/dL       Normal          0 - 129         [facility]  
   
                                        Comment on above:   Performed By: #### 2  
76348 ####  
[facility],[address] [place] [address]   
[address]   
   
                                                    Cholesterol.total/  
Cholesterol in HDL   
[Mass ratio]    3.3 {ratio}     Normal          0.0 - 5.0       [facility]  
   
                                        Comment on above:   Performed By: #### 2  
95847 ####  
[facility],[address] [place] [address]   
[address]   
   
                      Lipid 1996 panel            Normal                [facility]  
   
                                        Comment on above:   Result Comment: LIPI  
D PROFILE   
   
                                                            Performed By: #### 2  
54990 ####  
[facility],[address] [place] [address]   
[address]   
   
                                                    Triglyceride   
[Mass/Vol]      66 mg/dL        Normal          0 - 150         [facility]  
   
                                        Comment on above:   Performed By: #### 2  
65835 ####  
[address]   
[address]   
   
                                                    TSHon 2022   
   
                          TSH Qn       2.28 m[IU]/L Normal       0.35 -   
3.74                                    [facility]  
   
                                        Comment on above:   Performed By: #### 2  
58058 ####  
[facility],[address] [place] [address]   
[address]   
   
                                                    VITAMIN D, 25 HYDROXYon -   
   
                          VitD         33.30 ng/mL  Normal       30.00 -   
100                                     [facility]  
   
                                        Comment on above:   Result Comment: 25-O  
HD3 indicates both endogenous production   
and   
supplementation. 25-OHD2 is an  
indicator of exogenous sources, such as diet or supplementation.   
Therapy is  
based on measurement of Total 25-OHD, with levels <20 ng/mL   
indicative of  
Vitamin D deficiency, while levels between 20 ng/mL and 30 ng/mL   
suggest  
insufficiency. Optimal levels are >=30ng/mL.  
Vitamin D, 25-OH D3 Not Established  
Vitamin D, 25-OH D2 Not Established   
   
                                                            Performed By: #### 2  
65324 ####  
[facility],[address] [place] [address]   
[address]   
   
                                                    MR FOOT W/O LTon 11-   
   
                                        MR FOOT W/O LT      [address] [place] [address]  
Ph: [phone] Fax:   
[phone]  
  
Patient: MAGGY CARPIO  
Phone#: [phone] :   
1975 Age: 46 Gender: F  
MRN: [id number] Pt. Type: Out  
Account: [id number] Location:  
Ordering: BREANNE POMPEY   
Exam Date: 11/10/2022/7:43  
Family Phys: JOSE SLOAN Charge Code: [id number]  
Physician: Hinds Order #:   
[id number]  
Dose#:  
  
  
  
PROCEDURE: MRI FOOT LT   
WITHOUT CONTRAST  
  
COMPARISON: None.  
  
INDICATIONS: Left foot   
stress fracture  
  
TECHNIQUE: A complete   
multi-planar examination was   
performed without contrast.  
  
FINDINGS:  
BONES: There is a   
subchondral cyst at the   
distal aspect of the 2nd   
middle phalanx, series  
100, image 21. Remaining   
osseous structures are   
unremarkable.  
JOINTS: Normal   
interphalangeal,   
tarsometatarsal, and   
metatarsophalangeal joints.  
PLANTAR FASCIA: Normal. No   
tear or surrounding soft   
tissue edema to suggest   
fasciitis.  
EFFUSIONS: There is a trace   
effusion at the calcaneal   
cuboid articulation.  
TENDONS: There is fluid   
surrounding the flexor   
digitorum longus and flexor   
hallucis longus  
tendons consistent with   
tenosynovitis, series 100,   
image 12 and series 5 image  
51. The fluid is seen   
surrounding the tendon at   
the level of the midfoot.  
OTHER: No other significant   
findings.  
  
CONCLUSION:  
1. Tenosynovitis of the   
flexor digitorum longus and   
flexor hallucis longus   
tendons.  
  
  
Dictated by: Nicci Owens MD   
on 2022 at 19:39  
Approved by: Nicci Owens MD   
on 2022 at 20:00 Normal                                  [facility]  
   
                                                    FOOT COMPLETE RTon 10-  
2   
   
                                        FOOT COMPLETE RT    [address] [address]  
Ph: [phone] Fax:   
[phone]  
  
Patient: MAGGY CARPIO  
Phone#: [phone] :   
1975 Age: 46 Gender: F  
MRN: [id number] Pt. Type: Out  
Account: [id number] Location:  
Ordering: [facility] Exam   
Date: 10/28/2022/12:54  
Family Phys: JOSE SLOAN Charge Code: [id number]  
Physician: Hinds Order #:   
[id number]  
Dose#:  
  
  
  
  
PROCEDURE: X-RAY FOOT RT   
COMPLETE MIN 3 VIEWS  
  
COMPARISON: [facility], , FOOT RT   
COMPLETE, 2022, 13:45.  
  
INDICATIONS: Plantar fascial   
fibromatosis.  
  
FINDINGS:  
BONES: A small calcaneal   
spur is present. There is no   
evidence of acute bone   
abnormality.  
SOFT TISSUES: Negative. No   
visible soft tissue   
swelling.  
EFFUSION: None visible.  
OTHER: Negative.  
  
CONCLUSION: No acute   
disease. No significant   
change has occurred.  
  
  
Dictated by: Peg Kc MD on 10/28/2022 at 13:43  
Approved by: Peg Kc MD on 10/28/2022 at 13:44 Normal                                  [facility]  
   
                                                    FOOT COMPLETE LTon 10-  
2   
   
                                        FOOT COMPLETE LT    [address] [address]  
Ph: [phone] Fax:   
[phone]  
  
Patient: MAGGY CARPIO  
Phone#: [phone] :   
1975 Age: 46 Gender: F  
MRN: [id number] Pt. Type: Out  
Account: [id number] Location:  
Ordering: PRABHU SCHMIDT Exam   
Date: 10/13/2022/15:37  
Family Phys: JOSE SLOAN Charge Code: [id number]  
Physician: Hinds Order #:   
[id number]  
Dose#:  
  
  
  
  
PROCEDURE: X-RAY FOOT LT   
COMPLETE MIN 3 VIEWS  
  
COMPARISON: None.  
  
INDICATIONS: Pain in left   
foot.  
  
FINDINGS:  
BONES: Normal. No   
significant arthropathy or   
acute abnormality. Small os   
trigonum  
measuring 0.6 x 0.4 cm  
SOFT TISSUES: Negative. No   
visible soft tissue   
swelling.  
EFFUSION: None visible.  
OTHER: Negative.  
  
CONCLUSION:  
1. Unremarkable left foot   
radiograph  
  
  
Dictated by: Nicci Owens MD   
on 10/13/2022 at 17:12  
Approved by: Nicci Owens MD   
on 10/13/2022 at 17:13 Normal                                  [facility]  
   
                                                    MR ANKLE WO RTon 2022   
   
                                        MR ANKLE WO RT      [address] [address]  
Ph: [phone] Fax:   
[phone]  
  
Patient: MAGGY CARPIO  
Phone#: [phone] :   
1975 Age: 46 Gender: F  
MRN: [id number] Pt. Type: Out  
Account: [id number] Location:  
Ordering: [facility] Exam   
Date: 2022/7:43  
Family Phys: JOSE SLOAN Charge Code: [id number]  
Physician: Hinds Order #:   
[id number]  
Dose#:  
  
  
  
PROCEDURE: MRI ANKLE RT   
WITHOUT CONTRAST  
  
COMPARISON: None.  
  
INDICATIONS: Right ankle   
pain  
  
TECHNIQUE: A complete   
multi-planar examination was   
performed without contrast.  
  
FINDINGS:  
LATERAL LIGAMENTS AND SOFT   
TISSUE STRUCTURES  
TALOFIBULAR: The posterior   
talofibular ligament appears   
wavy likely chronically   
injured.  
The anterior talofibular   
ligament is thin. The all   
superior fibers are wavy,  
likely representing remote   
chronic injury.  
CALCANEOFIBULAR: Normal.  
TIBIOFIBULAR: Normal.  
PERONEAL TENDONS:Normal. No   
subluxation, tendinopathy,   
or tear.  
MEDIAL LIGAMENTS AND SOFT   
TISSUE STRUCTURES  
DELTOID COMPLEX: Normal.  
SPRING LIGAMENT: Normal.  
TARSAL TUNNEL: Normal.  
FLEXORS: Normal.  
OTHER TENDONS  
EXTENSORS: Normal.  
ACHILLES: Normal. No   
surrounding abnormality.  
PLANTAR FASCIA: Is focal   
thickening the medial cord   
of the plantar fascia,   
measuring 0.6 cm.  
There is hyperintense signal   
between the cord and the   
calcaneus  
suggesting partial tear at   
the calcaneal attachment.   
There is mild adjacent  
edema in the subcutaneous   
tissues.  
SINUS TARSI: Normal. No   
edema or synovitis to   
suggest sinus tarsi   
syndrome.  
BONES: There is an os   
trigonum measuring 0.4 x 0.9   
cm  
There is edema in the   
lateral talar dome, series   
18, image 24, with  
irregularity of the   
overlying cartilage.  
EFFUSIONS: Small joint   
effusion at the tibiotalar   
in talar calcaneal   
articulations. Small  
joint effusions are seen   
throughout the midfoot.  
OTHER: No other significant   
findings.  
Continued Report - Page 2 of   
2  
  
Patient: MAGGY CARPIO  
Phone#: [phone] :   
1975 Age: 46 Gender: F  
MRN: [id number] Pt. Type: Out  
Account: [id number] Location:  
Ordering: [facility] Exam   
Date: 2022/7:43  
Family Phys: JOSE SLOAN Charge Code: [id number]  
Physician: Hinds Order #:   
[id number]  
Dose#:  
  
  
CONCLUSION:  
1. Plantar fasciitis of the   
medial cord of the plantar   
aponeurosis with partial   
thickness tear at  
the calcaneal attachment.  
2. Anterior and posterior   
talofibular ligaments with   
appearance suggestive of   
chronic injury.  
3. Osteochondral injury of   
the talar dome  
  
  
Dictated by: Nicci Owens MD   
on 2022 at 16:51  
Approved by: Nicci Owens MD   
on 2022 at 17:15 Normal                                  [facility]  
   
                                                    MR FOOT W/O RTon 2022   
   
                                        MR FOOT W/O RT      [place] [address]  
Ph: [phone] Fax:   
[phone]  
  
Patient: MAGGY CARPIO  
Phone#: [phone] :   
1975 Age: 46 Gender: F  
MRN: [id number] Pt. Type: Out  
Account: [id number] Location:  
Ordering: PRABHU SHCMIDT Exam   
Date: 2022/7:43  
Family Phys: JOSE SLOAN Charge Code: [id number]  
Physician: Hinds Order #:   
[id number]  
Dose#:  
  
  
  
PROCEDURE: MRI FOOT RT   
WITHOUT CONTRAST  
  
COMPARISON: None.  
  
INDICATIONS: Right foot pain  
  
TECHNIQUE: A complete   
multi-planar examination was   
performed without contrast.  
  
FINDINGS:  
BONES: In the 2nd metatarsal   
proximally there is a   
hypodense line with   
extensive marrow  
edema consistent with a   
stress fracture. There is T2   
hyperintense signal  
surrounding the 2nd   
metatarsal.  
JOINTS: Normal   
interphalangeal,   
tarsometatarsal, and   
metatarsophalangeal joints.  
PLANTAR FASCIA: The distal   
portion is unremarkable in   
thickness. Please refer to   
MRI ankle report  
performed same day.  
EFFUSIONS: There are small   
effusions in the midfoot.  
TENDONS: Normal. No visible   
tendinitis, tear, or   
tenosynovitis.  
OTHER: No other significant   
findings.  
  
CONCLUSION:  
1. Second metatarsal   
proximal stress fracture.   
This report was communicated   
by telephone to  
Dr. Prabhu Schmidt at the   
dictation time shown below.  
  
  
Dictated by: Nicci Owens MD   
on 2022 at 17:15  
Approved by: Nicci Owens MD   
on 2022 at 17:25 Normal                                  [facility]  
   
                                                    ANKLE COMPLETE RTon 20  
22   
   
                                        ANKLE COMPLETE RT   [address] [address]  
Ph: [phone] Fax:   
[phone]  
  
Patient: MAGGY CARPIO  
Phone#: [phone] :   
1975 Age: 46 Gender: F  
MRN: [id number] Pt. Type: Out  
Account: [id number] Location:  
Ordering: BREANNE POMPEY   
Exam Date: 2022/13:43  
Family Phys: JOSE SLOAN Charge Code: [id number]  
Physician: Hinds Order #:   
[id number]  
DLP Dose#:  
  
  
  
  
PROCEDURE: X-RAY ANKLE   
COMPLETE RT MIN 3 VIEWS  
  
COMPARISON: None.  
  
INDICATIONS: Pain.  
  
FINDINGS:  
BONES: Bony   
demineralization. No   
significant arthropathy or   
acute abnormality. There is   
an  
os trigonum measuring 0.6 x   
1.0 cm. There is a small   
calcaneal spur measuring 0.4  
cm. There is enthesopathy at   
the Achilles attachment.  
SOFT TISSUES: Mild soft   
tissue swelling  
EFFUSION: None visible.  
OTHER: Negative.  
  
CONCLUSION:  
1. No acute osseous   
abnormality  
2. Calcaneal spurring.  
  
  
Dictated by: Nicci Owens MD   
on 2022 at 15:26  
Approved by: Nicci Owens MD   
on 2022 at 15:27 Normal                                  [facility]  
   
                                                    FOOT COMPLETE RTon   
2   
   
                                        FOOT COMPLETE RT    [address] [address]  
Ph: [phone] Fax:   
[phone]  
  
Patient: MAGGY CARPIO  
Phone#: [phone] :   
1975 Age: 46 Gender: F  
MRN: [id number] Pt. Type: Out  
Account: [id number] Location:  
Ordering: BREANNE POMPEY   
Exam Date: 2022/13:45  
Family Phys: JOSE SLOAN Charge Code: 989712  
Physician: Hinds Order #:   
[id number]  
DLP Dose#:  
  
  
  
  
PROCEDURE: X-RAY FOOT RT   
COMPLETE MIN 3 VIEWS  
  
COMPARISON: None.  
  
INDICATIONS: Pain.  
  
FINDINGS:  
BONES: No significant   
arthropathy or acute   
abnormality. Calcaneal spur   
measures 0.5 cm.  
Os trigonum is present.  
SOFT TISSUES: Negative. No   
visible soft tissue   
swelling.  
EFFUSION: None visible.  
OTHER: Negative.  
  
CONCLUSION:  
1. Calcaneal spur  
  
  
Dictated by: Nicci Owens MD   
on 2022 at 15:27  
Approved by: Nicci Owens MD   
on 2022 at 15:29 Normal                                  [facility]  
   
                                                    HEP B SURFACE AB, QUANT [CCL  
]on 2022   
   
                                                    HepB   
SurfaceAb,Quant 158.32 mIU/mL   Normal          >=12.00         [facility]  
   
                                        Comment on above:   Result Comment: Thes  
e results are consistent with previous   
exposure   
and/or immunity to  
the hepatitis B virus antigen[place] [address]  
Domingo Mcrae III, M.D.  
[id number]  
[phone]   
   
                                                            Performed By: #### 2  
[id number] ####  
[address]   
[id number]   
   
                                                    MEASLES IGG ANTIBODY [CCL]on  
 2022   
   
                      Measles IgG, Qual Positive   Normal     Positive   [facility]  
   
                                        Comment on above:   Result Comment: The   
result suggests recent or past exposure to  
   
Measles virus or Measles  
vaccination. The current test does not detect neutralizing   
antibodies.  
Positive result may also be seen due to presence of  
passively-transferred antibodies. Please correlate with patient's  
history.  
[place] [address] [place] [id number]  
Domingo Mcrae III, M.D.  
[id number]  
[phone]   
   
                                                            Performed By: #### 2  
[id number] ####  
[address]   
[address]   
   
                                                    MUMPS IGG AB [CCL]on   
022   
   
                      Mumps IgG, Qual Positive   Normal     Positive   [facility]  
   
                                        Comment on above:   Result Comment: The   
result suggests recent or past exposure to  
   
Mumps virus or Mumps  
vaccination. The current test does not detect neutralizing   
antibodies.  
Positive result may also be seen due to presence of  
passively-transferred antibodies. Please correlate with patient's  
history.   
[place] [address] [place] [address]  
Domingo Mcrae III, M.D.  
[id number]  
[phone]   
   
                                                            Performed By: #### 2  
[id number] ####  
[facility],[address] [place] [address]   
[id number]   
   
                                                    RUBELLA IgG ANTIBODY [OLD]on  
 2022   
   
                                                    Rubella IgG Ab,   
Qual            Positive        Normal          Positive        [facility]  
   
                                        Comment on above:   Result Comment: The   
result suggests recent or past exposure to  
   
Rubella virus or history  
of Rubella vaccination. Positive result may also be seen due to  
presence of passively-transferred antibodies. Please correlate with  
patient's history.  
[facility]  
[address] [place] [address]  
Domingo Mcrae III, M.D.  
[id number]  
[phone]   
   
                                                            Performed By: #### 2  
[id number] ####  
[facility],[address] [place] [address]   
[address]   
   
                                                    GLUCOSEon 2022   
   
                      Glucose [Mass/Vol] 90 mg/dL   Normal     74 - 106   [facility]  
   
                                        Comment on above:   Performed By: #### 2  
[id number] ####  
[facility],[address] [place] [address]   
[address]   
   
                                                    LIPID PROFILEon 2022   
   
                                                    Cholesterol   
[Mass/Vol]      232 mg/dL       Normal          0 - 240         [facility]  
   
                                        Comment on above:   Performed By: #### 2  
[id number] ####  
[facility],[address] [place] [address]   
[address]   
   
                                                    Cholesterol in HDL   
[Mass/Vol]      64 mg/dL        High            40 - 60         [facility]  
   
                                        Comment on above:   Performed By: #### 2  
[id number] ####  
[facility],[address] [place] [address]   
[address]   
   
                                                    Cholesterol in LDL   
[Mass/Vol]      150 mg/dL       High            0 - 129         [facility]  
   
                                        Comment on above:   Performed By: #### 2  
[id number] ####  
[facility],[address] [place] [address]   
[address]   
   
                                                    Cholesterol.total/  
Cholesterol in HDL   
[Mass ratio]    3.6 {ratio}     Normal          0.0 - 5.0       [facility]  
   
                                        Comment on above:   Performed By: #### 2  
[id number] ####  
[facility],[address] [place], [place] [id number]   
   
                      Lipid 1996 panel            Normal                [facility]  
   
                                        Comment on above:   Result Comment: LIPI  
D PROFILE   
   
                                                            Performed By: #### 2  
[id number] ####  
[facility],[address] [place], [place] [id number]   
   
                                                    Triglyceride   
[Mass/Vol]      88 mg/dL        Normal          0 - 150         [facility]  
   
                                        Comment on above:   Performed By: #### 2  
[id number] ####  
[facility],[address] [place] [address]   
   
                                                    URINE COTININE TEST [NEW EMP  
LOYEE]on 2022   
   
                          COTININE     Negative     Normal       NORMAL:   
NEGATIVE                                [facility]  
   
                                        Comment on above:   Result Comment: The   
COT One Step Cotinine Device (Urine)   
yields a   
positve result when  
the Cotinine in urine exceeds 200 ng/mL. A Cotinine concentration  
> 200 ng/mL indicates an active tobacco product user. The window  
of detection for Cotinine in urine at a cutoff level of 200 ng/mL   
is  
expected to be up to 2-3 days after nicotine use.   
   
                                                            Performed By: #### 2  
[id number] ####  
[facility],[address] [place] [address]   
   
                                                    M TUBERCULOSIS BY Marilou GRAMAJO 2020   
   
                      M. TB Mitogen-Nil 9.93 IU/mL Normal                [facility]  
   
                                        Comment on above:   Order Comment: The M  
. Tuberculosis antigen levels cannot be   
correlated to stage or degree of infection, response to therapy or   
likelihood for progression to active disease. Results from   
QuantiFERON TB Gold Plus must be used in conjunction with   
individual epidemiological history, current medical status, and   
results of other diagnostic evaluation.   
   
                                                            Performed By: #### Q  
FTB ####  
[facility] (DEFAULT)  
[address] [place] [address]   
   
                      M. TB Nil  0.07 IU/mL Normal                [facility]  
   
                                        Comment on above:   Order Comment: The M  
. Tuberculosis antigen levels cannot be   
correlated to stage or degree of infection, response to therapy or   
likelihood for progression to active disease. Results from   
QuantiFERON TB Gold Plus must be used in conjunction with   
individual epidemiological history, current medical status, and   
results of other diagnostic evaluation.   
   
                                                            Performed By: #### Q  
FTB ####  
[facility] (DEFAULT)  
[address] [address]   
   
                      M. TB TB1-Nil 0.02 IU/mL Normal                [facility]  
   
                                        Comment on above:   Order Comment: The M  
. Tuberculosis antigen levels cannot be   
correlated to stage or degree of infection, response to therapy or   
likelihood for progression to active disease. Results from   
QuantiFERON TB Gold Plus must be used in conjunction with   
individual epidemiological history, current medical status, and   
results of other diagnostic evaluation.   
   
                                                            Performed By: #### Q  
FTB ####  
[facility] (DEFAULT)  
[address] [place] [address]   
   
                      M. TB TB2-Nil 0.00 IU/mL Normal                [facility]  
   
                                        Comment on above:   Order Comment: The M  
. Tuberculosis antigen levels cannot be   
correlated to stage or degree of infection, response to therapy or   
likelihood for progression to active disease. Results from   
QuantiFERON TB Gold Plus must be used in conjunction with   
individual epidemiological history, current medical status, and   
results of other diagnostic evaluation.   
   
                                                            Performed By: #### Q  
FTB ####  
[facility] (DEFAULT)  
[address] [place] [address]   
   
                                                    M. Tuberculosis by   
Quantiferon in   
tube            Negative        Normal          Negative        [facility]  
   
                                        Comment on above:   Order Comment: The M  
. Tuberculosis antigen levels cannot be   
correlated to stage or degree of infection, response to therapy or   
likelihood for progression to active disease. Results from   
QuantiFERON TB Gold Plus must be used in conjunction with   
individual epidemiological history, current medical status, and   
results of other diagnostic evaluation.   
   
                                                            Performed By: #### Q  
FTB ####  
[facility] (DEFAULT)  
[address] [place] [address]   
   
                                                    Office Visiton 2017   
   
                                                    Alcoholism   
counseling   
(procedure)               no                        Invalid   
Interpretation   
Code                                                [facility]   
[facility]  
Work Phone:   
[phone]  
0  
   
                                                    Documentation of   
current   
medications   
(procedure)               Done                      Invalid   
Interpretation   
Code                                                [facility]   
[facility]   
[facility]  
Work Phone:   
[phone]  
0  
   
                                                    Tobacco smoking   
status NHIS               Never                     Invalid   
Interpretation   
Code                                                [facility]   
[facility]  
Work Phone:   
[phone]  
0  
   
                                Tobacco use CPHS Never smoker    Invalid   
Interpretation   
Code                                                [facility]   
[facility]   
[facility]  
Work Phone:   
[phone]  
0  
   
                                                    Office Visiton 2017   
   
                                                    Alcoholism   
counseling   
(procedure)               no                        Invalid   
Interpretation   
Code                                                [facility]   
[facility]   
[facility]  
Work Phone:   
[phone]  
0  
   
                                                    Documentation of   
current   
medications   
(procedure)               Done                      Invalid   
Interpretation   
Code                                                [facility]   
[facility]   
[facility]  
Work Phone:   
[phone]  
0  
   
                      Protein mass conc Done                             [facility]  
Work Phone:   
[phone]  
0  
   
                      Protein mass conc no                               [facility]  
Work Phone:   
[phone]  
0  
   
                                                    Tobacco smoking   
status NHIS     Never                                           [facility]   
[facility]   
[facility]  
Work Phone:   
[phone]  
0  
   
                                                    Tobacco smoking   
status NHIS     Never smoker                                    [facility]   
[facility]   
[facility]  
Work Phone:   
[phone]  
0  
   
                                Tobacco use CPHS Never smoker    Invalid   
Interpretation   
Code                                                [facility]   
[facility]   
[facility]  
Work Phone:   
[phone]  
0  
   
                                                    Office Visiton 2017   
   
                                                    Alcoholism   
counseling   
(procedure)               no                        Invalid   
Interpretation   
Code                                                [facility]   
[facility]   
[facility]  
Work Phone:   
[phone]  
0  
   
                                                    Documentation of   
current   
medications   
(procedure)               Done                      Invalid   
Interpretation   
Code                                                [facility]   
Medicine and   
Orthopaedics  
Work Phone:   
[phone]  
0  
   
                                                    Tobacco smoking   
status NHIS               Never                     Invalid   
Interpretation   
Code                                                [facility]   
[facility]  
Work Phone:   
[phone]  
0  
   
                                Tobacco use CPHS Never smoker    Invalid   
Interpretation   
Code                                                [facility]   
[facility]   
[facility]  
Work Phone:   
[phone]  
0  
   
                                                    Office Visiton 2017   
   
                                                    Alcoholism   
counseling   
(procedure)               no                        Invalid   
Interpretation   
Code                                                [facility]   
[facility]   
[facility]  
Work Phone:   
[phone]  
0  
   
                                                    Documentation of   
current   
medications   
(procedure)               Done                      Invalid   
Interpretation   
Code                                                [facility]   
[facility]   
[facility]  
Work Phone:   
[phone]  
0  
   
                                                    Tobacco smoking   
status NHIS               Never                     Invalid   
Interpretation   
Code                                                [facility]   
[facility]   
[facility]  
Work Phone:   
[phone]  
0  
   
                                Tobacco use CPHS Never smoker    Invalid   
Interpretation   
Code                                                [facility]   
[facility]   
[facility]  
Work Phone:   
[phone]  
0  
   
                                                    Bacteria identified Anaer cx  
 Nom (Unsp spec)   
   
                      Anaerobic Culture Actinomyces species                       
  [facility]  
Work Phone:   
[phone]  
0  
   
                                                    Bacteria identified Cx Nom (  
Wound)   
   
                      Wound Culture Actinomyces canis                       [facility]  
Work Phone:   
[phone]  
0  
   
                                                    Gram stain for investigation  
 of transfusion reaction   
   
                                                    Microscopic   
observation Gram   
stain Nom (Unsp   
spec)                                                           [facility]  
Work Phone:   
[phone]  
0  
  
  
  
Vital Signs  
  
  
                      Date Time  Vital Sign Value      Performing Clinician Faci  
lity  
   
                                                    2024   
15:          Body height         177.8 cm            Dr. Jose Sloan  
Work Phone:   
[phone]                          [facility]  
   
                                                    2024   
15:                              Body mass index   
(BMI) [Ratio]             33.7 kg/m2                Dr. Jose Sloan  
Work Phone:   
[phone]                          [facility]  
   
                                                    2024   
15:          Body weight         106.59 kg           Dr. Jose Sloan  
Work Phone:   
[phone]                          [facility]  
   
                                                    2024   
15:                              Diastolic blood   
pressure                  85 mm[Hg]                 Dr. Jose Sloan  
Work Phone:   
[phone]                          [facility]  
   
                                                    2024   
15:          Respiratory rate    18 /min             Dr. Jose Sloan  
Work Phone:   
[phone]                          [facility]  
   
                                                    2024   
15:                              Systolic blood   
pressure                  127 mm[Hg]                Dr. Jose Sloan  
Work Phone:   
[phone]                          [facility]  
   
                                                    2024   
08:          Body height         177.8 cm            Shani Sanchez MD  
Work Phone:   
[phone]                          [facility]  
   
                                                    2024   
08:          Body weight         110.22 kg           Shani Sanchez MD  
Work Phone:   
[phone]                          [facility]  
   
                                                    2024   
08:                              Diastolic blood   
pressure                  78 mm[Hg]                 Shani Sanchez MD  
Work Phone:   
[phone]                          [facility]  
   
                                                    2024   
08:                              Systolic blood   
pressure                  122 mm[Hg]                Shani Sanchez MD  
Work Phone:   
[phone]                          [facility]  
   
                                                    2024   
15:          Body height         177.8 cm            Dr. Krystian Sloan  
Work Phone:   
[phone]                          [facility]  
   
                                                    2024   
15:                              Diastolic blood   
pressure                  84 mm[Hg]                 Dr. Krystian Sloan  
Work Phone:   
[phone]                          [facility]  
   
                                                    2024   
15:          Respiratory rate    16 /min             Dr. Krystian Sloan  
Work Phone:   
[phone]                          [facility]  
   
                                                    2024   
15:                              Systolic blood   
pressure                  129 mm[Hg]                Dr. Krystian Sloan  
Work Phone:   
[phone]                          [facility]  
   
                                                    2022   
08:          Body temperature    97.8 [degF]         Dr. Krystian Sloan  
Work Phone:   
[phone]                          [facility]  
   
                                                    2022   
08:                              Diastolic blood   
pressure                  81 mm[Hg]                 Dr. Krystian Sloan  
Work Phone:   
[phone]                          [facility]  
   
                                                    2022   
08:          Heart rate          62 /min             Dr. Krystian Sloan  
Work Phone:   
[phone]                          [facility]  
   
                                                    2022   
08:          Respiratory rate    16 /min             Dr. Krystian Sloan  
Work Phone:   
[phone]                          [facility]  
   
                                                    2022   
08:                              SaO2% (BldA) [Mass   
fraction]                 100 %                     Dr. Krystian Sloan  
Work Phone:   
[phone]                          [facility]  
   
                                                    2022   
08:                              Systolic blood   
pressure                  111 mm[Hg]                Dr. Krystian Sloan  
Work Phone:   
[phone]                          [facility]  
   
                                                    2022   
07:          Body height         177.8 cm            Dr. Krystian Sloan  
Work Phone:   
[phone]                          [facility]  
   
                                                    2022   
07:                              Body mass index   
(BMI) [Ratio]             30.3 kg/m2                Dr. Krystian Sloan  
Work Phone:   
[phone]                          [facility]  
   
                                                    2022   
07:          Body weight         95.8 kg             Dr. Krystian Sloan  
Work Phone:   
[phone]                          [facility]  
   
                                                    2022   
09:          Body height         177.8 cm            Dr. Krystian Sloan  
Work Phone:   
[phone]                          [facility]  
Work Phone:   
[phone]  
   
                                                    2022   
09:                              Body mass index   
(BMI) [Ratio]             29.8 kg/m2                Dr. Krystian Sloan  
Work Phone:   
[phone]                          [facility]  
   
                                                    2022   
09:          Body weight         94.34 kg            Dr. Krystian Sloan  
Work Phone:   
[phone]                          [facility]  
   
                                                    10-   
08:          Body height         177.8 cm            Shani Sanchez MD  
Work Phone:   
[phone]                          [facility]  
   
                                                    10-   
08:          Body weight         95.25 kg            Shani Sanchez MD  
Work Phone:   
[phone]                          [facility]  
   
                                                    10-   
08:                              Diastolic blood   
pressure                  76 mm[Hg]                 Shani Sanchez MD  
Work Phone:   
[phone]                          [facility]  
   
                                                    10-   
08:                              Systolic blood   
pressure                  120 mm[Hg]                Shani Sanchez MD  
Work Phone:   
[phone]                          [facility]  
   
                                                    2021   
12:      Body height     177.8 cm                        [facility]  
Work Phone:   
[phone]  
   
                                                    2021   
12:                              Body mass index   
(BMI) [Ratio]       34.2 kg/m2                              [facility]  
Work Phone:   
[phone]  
   
                                                    2021   
12:      Body temperature 97.1 [degF]                     [facility]  
Work Phone:   
[phone]  
   
                                                    2021   
12:      Body weight     108.4 kg                        [facility]  
Work Phone:   
[phone]  
   
                                                    2021   
12:                              Diastolic blood   
pressure            80 mm[Hg]                               [facility]  
Work Phone:   
[phone]  
   
                                                    2021   
12:      Heart rate      104 /min                        [facility]  
Work Phone:   
[phone]  
   
                                                    2021   
12:      Respiratory rate 20 /min                         [facility]  
Work Phone:   
[phone]  
   
                                                    2021   
12:                              SaO2% (BldA) [Mass   
fraction]           98 %                                    [facility]  
Work Phone:   
[phone]  
   
                                                    2021   
12:                              Systolic blood   
pressure            112 mm[Hg]                              [facility]  
Work Phone:   
[phone]  
   
                                                    2015   
16:                              BMI (Body Mass   
Index)              27.61 kg/m2         [facility]   
Sports Medicine and   
Orthopaedics  
Work Phone:   
[phone]  
   
                                                    2015   
16:      Body Temperature 98.3 [degF]     [facility]   
Sports Medicine and   
Orthopaedics  
Work Phone:   
[phone]  
   
                                                    2015   
16:      BP Diastolic    82 mm[Hg]       [facility]   
Sports Medicine and   
Orthopaedics  
Work Phone:   
[phone]  
   
                                                    2015   
16:      BP Systolic     126 mm[Hg]      [facility]   
Sports Medicine and   
Orthopaedics  
Work Phone:   
[phone]  
   
                                                    2015   
16:                              BSA (Body Surface   
Area)               2.01 m2             [facility]   
Sports Medicine and   
Orthopaedics  
Work Phone:   
[phone]  
   
                                                    2015   
16:      Pulse (Heart Rate) 70 /min         [facility]   
Sports Medicine and   
Orthopaedics  
Work Phone:   
[phone]  
   
                                                    2015   
16:      Pulse Oximetry  97 %            [facility]   
Sports Medicine and   
Orthopaedics  
Work Phone:   
[phone]  
   
                                                    2015   
16:140400      Respiratory Rate 16 /min         [facility]  
ter   
Sports Medicine and   
Orthopaedics  
Work Phone:   
[phone]  
   
                                                    2015   
16:14040      Weight          84.82 kg        [facility]  
er   
Sports Medicine and   
Orthopaedics  
Work Phone:   
[phone]  
   
                                                    2012   
16:080400      Height          175.26 cm       [facility]  
er   
Sports Medicine and   
Orthopaedics  
Work Phone:   
[phone]  
  
  
  
Encounters  
  
  
                          Encounter Date Encounter Type Care Provider Facility  
   
                                                    Start: 2024  
End: 2024     ambulatory          [facility]        Facility:[facility]  
   
                                                    Start: 2024  
End: 2024                         Subsequent hospital visit   
by physician              Reinaldo Noland 2                  [facility]  
   
                                        Comment on above:   Encounter for screen  
ing for nutritional disorder   
   
                                                    Start: 2024  
End: 2024           ambulatory                CHRISTOPHER BEHR   
[facility]  
   
                                                    Start: 2024  
End: 2024     [facility]          [facility]  [facility]  
   
                                                    Start: 2024  
End: 2024     ambulatory          [facility]  Facility:[facility]  
   
                                                    Start: 2024  
End: 2024     ambulatory          [facility]  Facility:[facility]  
   
                                        Start: 2024   Encounter for genera  
l   
adult medical examination   
without abnormal findings [facility]        [facility]  
   
                                                    Start: 2024  
End: 2024                         Patient encounter   
procedure                               Dr. Jose Sloan  
Work Phone:   
[phone]                          [facility]  
Work Phone:   
[phone]  
   
                                                    Start: 2024  
End: 2024           ambulatory                Dr. Jose Sloan  
Work Phone:   
[phone]                          [facility]  
Work Phone:   
[phone]  
   
                                Start: 2024 Documentation procedure Mammog  
deloris   
Coordinator                             [facility]   
MAIN  
   
                                Start: 2024 Letter encounter Mammography   
Coordinator                             [facility]   
Department  
   
                                                    Start: 2024  
End: 2024           ambulatory                Dr. Krystian Sloan  
Work Phone:   
[phone]                          [facility]  
Work Phone:   
[phone]  
   
                                                    Start: 2024  
End: 2024                         Patient encounter   
procedure                               Dr. Krystian Sloan  
Work Phone:   
[phone]                          [facility]  
Work Phone:   
[phone]  
   
                                                    Start: 2024  
End: 2024           ambulatory                SHANI SANCHEZ                                Facility:[facility]  
   
                                                    Start: 2024  
End: 2024                         Subsequent hospital visit   
by physician              Screen [facility]     Mammogram  
   
                                        Comment on above:   Encounter for screen  
ing mammogram for breast cancer [Z12.31]   
   
                                                    Start: 2024  
End: 2024           ambulatory                SHANI SANCHEZ                                Facility:[facility]  
   
                                                    Start: 2024  
End: 2024                         Patient encounter   
procedure                               Shani Sanchez MD  
Work Phone:   
[phone]                          OB/Gynecology  
   
                                        Comment on above:   Encounter for gyneco  
logical examination (general) (routine)   
without   
abnormal findings (Primary Dx);  
Encounter for screening mammogram for breast cancer   
   
                                                    Start: 2024  
End: 2024           Patient encounter status  Shani Sanchez MD  
Work Phone:   
[phone]                          [facility]  
   
                                                    Start: 2024  
End: 2024     ambulatory          FAYE VALERO        Facility:[facility]  
   
                                                    Start: 2024  
End: 2024                         Subsequent hospital visit   
by physician                            [facility]  
Work Phone:   
[phone]                          Cat Scan  
   
                          Start: 2024 ambulatory   Faye Valero Facility  
:[facility]  
   
                                                    Start: 2024  
End: 2024                         Patient encounter   
procedure                               Dr. Krystian Sloan  
Work Phone:   
[phone]                          [facility]  
Work Phone:   
[phone]  
   
                                                    Start: 2024  
End: 2024           ambulatory                Dr. Krystian Sloan  
Work Phone:   
[phone]                          [facility]  
Work Phone:   
[phone]  
   
                                Start: 2024 ambulatory      Shani Sanchez MD  
Work Phone:   
[phone]                          [facility]  
   
                                        Start: 2024   Patient encounter   
procedure                               Shani Sanchez MD  
Work Phone:   
[phone]                          OB/Gynecology  
   
                                        Comment on above:   Schedule Appointment  
   
   
                                                    Start: 2023  
End: 2023           ambulatory                Dr. Krystian Sloan  
Work Phone:   
[phone]                          [facility]  
Work Phone:   
[phone]  
   
                                                    Start: 2023  
End: 2023                         Patient encounter   
procedure                               Dr. Krystian Sloan  
Work Phone:   
[phone]                          [facility]   
[facility]  
   
                                                    Start: 2023  
End: 2023                         Patient encounter   
procedure                               Dr. Krystian Sloan  
Work Phone:   
[phone]                          [facility]  
   
                                Start: 2023 ambulatory      Shani Sanchez MD  
Work Phone:   
[phone]                          OB/Gynecology  
   
                                        Comment on above:   labs   
   
                                        Start: 2023   E-mail encounter fro  
m   
caregiver                               Shani Sanchez MD  
Work Phone:   
[phone]                          [facility]  
   
                                                    Start: 2023  
End: 2023           ambulatory                Dr. Krystian Sloan  
Work Phone:   
[phone]                          [facility]  
Work Phone:   
[phone]  
   
                                                    Start: 2023  
End: 2023                         Patient encounter   
procedure                               Dr. Krystian Sloan  
Work Phone:   
[phone]                          [facility]  
   
                          Start: 02- ambulatory   JOSE CODY  
[facility]  
   
                                                    Start: 02-  
End: 02-     ambulatory          JOSE SLOAN [facility]  
   
                                Start: 2023 ambulatory      Shani Sanchez MD  
Work Phone:   
[phone]                          OB/Gynecology  
   
                                        Comment on above:   Labs   
   
                                                    Start: 2023  
End: 2023                         Patient encounter   
procedure                               Dr. Krystian Sloan  
Work Phone:   
[phone]                          [facility]  
   
                                                    Start: 2023  
End: 2023                         Patient encounter   
procedure                               Dr. Krystian Sloan  
Work Phone:   
[phone]                          [facility]  
   
                                                    Start: 2023  
End: 2023                         Patient encounter   
procedure                               Dr. Krystian Sloan  
Work Phone:   
[phone]                          [facility]   
[facility]  
   
                                                    Start: 2023  
End: 2023           ambulatory                Dr. Krystian Sloan  
Work Phone:   
[phone]                          [facility]  
Work Phone:   
[phone]  
   
                                                    Start: 2023  
End: 2023                         Patient encounter   
procedure                               Dr. Krystian Sloan  
Work Phone:   
[phone]                          [facility]  
   
                                Start: 2023 Telephone encounter Shanirosy Sanchez MD  
Work Phone:   
[phone]                          OB/Gynecology  
   
                                        Comment on above:   Results; Orders   
   
                                                    Start: 2023  
End: 2023     ambulatory          JOSE BELTRAN [facility]  
   
                                                    Start: 2023  
End: 2023     ambulatory          JOSE BELTRAN [facility]  
   
                                Start: 2022 Non-patient / Non-visit Dr. Nuria Sloan  
Work Phone:   
[phone]                          [facility]  
   
                                                    Start: 2022  
End: 2022                         Admission to same day   
surgery center                          Dr. Krystian Sloan  
Work Phone:   
[phone]                          [facility]  
   
                                                    Start: 2022  
End: 2022           ambulatory                Dr. Krystian Sloan  
Work Phone:   
[phone]                          [facility]  
Work Phone:   
[phone]  
   
                                                    Start: 2022  
End: 2022           ambulatory                Dr. Krystian Sloan  
Work Phone:   
[phone]                          [facility]  
Work Phone:   
[phone]  
   
                                                    Start: 2022  
End: 2022                         Patient encounter   
procedure                               Dr. Krystian Sloan  
Work Phone:   
[phone]                          [facility]  
   
                                                    Start: 2022  
End: 2022           ambulatory                Dr. Krystian Sloan  
Work Phone:   
[phone]                          [facility]  
Work Phone:   
[phone]  
   
                                                    Start: 2022  
End: 2022                         Patient encounter   
procedure                               Dr. Krystian Sloan  
Work Phone:   
[phone]                          [facility]   
Specimen  
   
                                Start: 2022 Non-patient / Non-visit Dr. Nuria lSoan  
Work Phone:   
[phone]                          [facility]  
   
                                                    Start: 2022  
End: 2022                         Patient encounter   
procedure                               Dr. Krystian Sloan  
Work Phone:   
[phone]                          [facility]  
   
                                                    Start: 2022  
End: 2022                         Patient encounter   
procedure                               Karely Comer MD  
Work Phone:   
[phone]                          OB/Gynecology  
   
                                        Comment on above:   DUB (dysfunctional u  
terine bleeding) (Primary Dx)   
   
                                                    Start: 2022  
End: 2022           ambulatory                Ob Ul[facility]  
Work Phone:   
[phone]                          OB/Gynecology  
   
                                                    Start: 2022  
End: 2022                         Patient encounter   
procedure                               [facility] [place]   
Ultrasound  
Work Phone:   
[phone]                          [facility]  
   
                                                    Start: 2022  
End: 2022     [facility]          [facility]  
   
                                Start: 2022 Non-patient / Non-visit Dr. Nuria Sloan  
Work Phone:   
[phone]                          [facility]  
   
                                                    Start: 11-  
End: 11-            Get Medical Advice     Shani Sanchez MD  
Work Phone:   
[phone]                          OB/Gynecology  
   
                                        Comment on above:   Lab Order   
   
                                                    Start: 10-  
End: 10-     ambulatory          JOSE BELTRAN [facility]  
   
                                                    Start: 10-  
End: 10-                         Patient encounter   
procedure                               Shani Sanchez MD  
Work Phone:   
[phone]                          OB/Gynecology  
   
                                        Comment on above:   Encounter for gyneco  
logical examination (general) (routine)   
without   
abnormal findings (Primary Dx);  
Encounter for screening mammogram for malignant neoplasm of breast;  
Routine medical exam;  
Screening for thyroid disorder;  
Screening for deficiency anemia;  
Screening cholesterol level;  
Encounter for vitamin deficiency screening;  
Encounter for screening for diabetes mellitus;  
Abnormal uterine bleeding (AUB);  
Screening for cervical cancer;  
Encounter for screening for human papillomavirus (HPV);  
Screen for colon cancer;  
Special screening for malignant neoplasms, colon   
   
                                                    Start: 10-  
End: 10-           Patient encounter status  Shani Sanchez MD  
Work Phone:   
[phone]                          OB/Gynecology  
   
                                                    Start: 10-  
End: 10-     ambulatory          JOSE BELTRAN [facility]  
   
                                                    Start: 2022  
End: 2022     ambulatory          JOSE BELTRAN [facility]  
   
                                                    Start: 2022  
End: 2022     ambulatory          JOSE BELTRAN [facility]  
   
                                                    Start: 2022  
End: 2022     ambulatory          [facility]  Facility:  
   
                                                    Start: 2022  
End: 2022     ambulatory          JOSE BELTRAN [facility]  
   
                                                    Start: 2022  
End: 2022                         Encounter for general   
adult medical examination   
without abnormal findings               [facility]   
[facility]  
   
                                                    Start: 2021  
End: 2021                         Patient encounter   
procedure                                           [facility]  
  
  
  
Procedures  
  
  
                          Date         Procedure    Procedure Detail Performing   
Clinician  
   
                          Start: 2024 Mammography               Reinaldo 2  
   
                          Start: 2023 Mammography               Shani Sanchez MD  
Work Phone:   
[phone]  
   
                                                    Start: 2022  
End: 2022     Colonoscopy                             Dr. Krystian thompson  
Work Phone:   
[phone]  
   
                                        Start: 2022   Us pelvic nonobstetr  
ic   
real-time image complete                            Shani Sanchez MD  
Work Phone:   
[phone]  
   
                                        Start: 10-   Microscopic observat  
ion   
[Identifier] in Cervix by   
Cyto stain                                          Reinaldo 2  
   
                                        Start: 2021   Lipid 1996 panel - S  
mckay or   
Plasma                                              Shani Sanchez MD  
Work Phone:   
[phone]  
   
                          Start: 2021 Mammography               Shani Sanchez MD  
Work Phone:   
[phone]  
   
                                                    Start: 2015  
End: 2015     Shave skin lesion                       Crow Godfrey MD  
Work Phone:   
[phone]  
   
                                                    Start: 2015  
End: 2015     Shave skin lesion                       Crow Godfrey MD  
Work Phone:   
[phone]  
   
                                                    Start: 2015  
End: 2015     Shave skin lesion 0.5 cm/<                     Crow Godfrey MD  
Work Phone:   
[phone]  
   
                                       Anaerobic microbial culture                
Dr. Krystian Sloan  
Work Phone:   
[phone]  
   
                                       Anaerobic microbial culture                
Dr. Krystian Sloan  
Work Phone:   
[phone]  
   
                                                            Investigation of   
transfusion reaction                                Dr. Krystian Sloan  
Work Phone:   
[phone]  
   
                                                            Investigation of   
transfusion reaction                                Dr. Krystian Sloan  
Work Phone:   
[phone]  
   
                                       Microbial culture, routine              D  
mihaela Sloan  
Work Phone:   
[phone]  
   
                                       Microbial culture, routine              D  
mihaela Sloan  
Work Phone:   
[phone]  
  
  
  
Plan of Treatment  
  
  
                          Date         Care Activity Detail       Author  
   
                                        Start: 2032   Screening for malign  
ant   
neoplasm of colon                                   [facility]  
   
                                        Start: 11-   Zoster Vaccines (1 o  
f   
2)                        Zoster Vaccines (1 of 2)  [facility]  
   
                          Start: 10- HPV TESTING  HPV TESTING  [facility]  
   
                          Start: 10- PAP TESTING  PAP TESTING  [facility]  
   
                                        Start: 10-   Screening for malign  
ant   
neoplasm of cervix                                  [facility]  
   
                                        Start: 2027   DTaP/Tdap/Td Vaccine  
s   
(3 - Td or Tdap)                        DTaP/Tdap/Td Vaccines (3   
- Td or Tdap)                           [facility]  
   
                                        Start: 2027   Urine microalbumin   
profile                                 DTaP,Tdap,Td Vaccine (2   
- Td or Tdap)                           [facility]  
   
                          Start: 2026 Lipid panel  Lipid Screening [facility]  
   
                          Start: 2026 LIPID SCREEN LIPID SCREEN [facility]  
   
                                        Start: 10-   Screening for malign  
ant   
neoplasm of cervix                                  [facility]  
   
                                        Start: 2025   Screening for malign  
ant   
neoplasm of breast                                  [facility]  
   
                          Start: 2024 Influenza vaccination              C  
[facility]  
   
                          Start: 2024 DIABETES SCREEN DIABETES SCREEN [facility]v  
[facility]  
   
                          Start: 2024 Diabetes Screening Diabetes Screenin  
g [facility]  
   
                          Start: 2024 Mammography  MAMMOGRAM    [facility]  
   
                                        Start: 2024   Screening for malign  
ant   
neoplasm of breast        Mammogram Screening       [facility]  
   
                                        Start: 2024   Behavioral Health   
Screening                               Behavioral Health   
Screening                               [facility]  
   
                          Start: 2024 Depression Assessment [facility] Ass  
[facility]  
   
                          Start: 10- HPV TESTING  HPV TESTING  [facility]  
   
                          Start: 10- PAP TESTING  PAP TESTING  [facility]  
   
                                        Start: 2023   Covid-19 Vaccine ( season)                         Covid-19 Vaccine ( season)                         [facility]  
   
                          Start: 2023 Influenza vaccination Influenza Vacc  
ine (#1) [facility]  
   
                          Start: 2023 DEPRESSION ASSESSMENT DEPRESSION ASS  
ESSMENT [facility]  
   
                                        Start: 2022   Colonoscopy w/biopsy  
   
single/multiple           COLONOSCOPY AND BIOPSY    [facility]  
   
                          Start: 2022 Patient discharge              [facility]  
   
                                        Start: 2022   Bacteria identified   
in   
Wound deep by Culture                               [facility]  
Work Phone:   
[phone]  
   
                          Start: 11- COLOGUARD (FIT-DNA) COLOGUARD (FIT-D  
NA) [facility]  
   
                          Start: 11- Colonoscopy  COLONOSCOPY  [facility]  
   
                                        Start: 11-   COLORECTAL CANCER   
SCREENING                               COLORECTAL CANCER   
SCREENING                               [facility]  
   
                          Start: 11- CT COLONOGRAPHY CT COLONOGRAPHY [facility]  
   
                          Start: 11- FECAL OCCULT BLOOD FECAL OCCULT BLOO  
D [facility]  
   
                                        Start: 11-   Screening for malign  
ant   
neoplasm of colon                                   [facility]  
   
                          Start: 11- SIGMOIDOSCOPY SIGMOIDOSCOPY [facility]  
   
                                                    Start: 10-  
End: 2022                         25-hydroxyvitamin D3   
[Mass/volume] in Serum   
or Plasma                               VITAMIN D 25 HYDROXY Lab   
Routine Routine medical   
exam Encounter for   
vitamin deficiency   
screening Expected:   
10/28/2022, Expires:   
2022                              [facility]  
Work Phone:   
[phone]  
   
                                        Comment on above:   Expected: 10/28/2022  
, Expires: 2022   
   
                                                    Start: 10-  
End: 2022                         Basic metabolic 2000   
panel - Serum or Plasma                 BASIC METABOLIC PNL Lab   
Routine Routine medical   
exam Encounter for   
screening for diabetes   
mellitus Expected:   
10/28/2022, Expires:   
2022                              [facility]  
Work Phone:   
[phone]  
   
                                        Comment on above:   Expected: 10/28/2022  
, Expires: 2022   
   
                                                    Start: 10-  
End: 2022                         CBC panel - Blood by   
Automated count                         CBC Lab Routine Routine   
medical exam Screening   
for deficiency anemia   
Expected: 10/28/2022,   
Expires: 2022                     [facility]  
Work Phone:   
[phone]  
   
                                        Comment on above:   Expected: 10/28/2022  
, Expires: 2022   
   
                                                    Start: 10-  
End: 2022           LIPID PANEL, NONFASTING   LIPID PANEL, NONFASTING   
Lab Routine Routine   
medical exam Screening   
cholesterol level   
Expected: 10/28/2022,   
Expires: 2022                     [facility]  
Work Phone:   
[phone]  
   
                                        Comment on above:   Expected: 10/28/2022  
, Expires: 2022   
   
                                                    Start: 10-  
End: 10-           PELVIC US [facility]             PELVIC US [facility] Anc   
Imaging Routine Abnormal   
uterine bleeding (AUB)   
Expected: 10/28/2022,   
Expires: 10/28/2023                     [facility]  
Work Phone:   
[phone]  
   
                                        Comment on above:   Expected: 10/28/2022  
, Expires: 10/28/2023   
   
                                                    Start: 10-  
End: 2022                         Thyrotropin   
[Units/volume] in Serum   
or Plasma                               TSH BLD Lab Routine   
Routine medical exam   
Screening for thyroid   
disorder Expected:   
10/28/2022, Expires:   
2022                              [facility]  
Work Phone:   
[phone]  
   
                                        Comment on above:   Expected: 10/28/2022  
, Expires: 2022   
   
                          Start: 2022 Influenza vaccination INFLUENZA (#1)  
 [facility]  
   
                          Start: 2022 Mammography  MAMMOGRAM    [facility]  
   
                          Start: 2022 DEPRESSION ASSESSMENT DEPRESSION ASS  
ESSMENT [facility]  
   
                                                    Start: 2017  
End: 2017     Appointment         Appointment         [facility]   
Sports Medicine and   
Orthopaedics  
Work Phone:   
[phone]  
   
                                                    Start: 2017  
End: 2017     Appointment         Appointment         [facility] Medicine and   
Orthopaedics  
Work Phone:   
[phone]  
   
                                                    Start: 2017  
End: 2017                         Physical Therapy   
General                                 Physical Therapy General   
[facility], [address] [place] [address], [id number]   
[phone]                         [facility]   
Sports Medicine and   
Orthopaedics  
Work Phone:   
[phone]  
   
                                                    Start: 2017  
End: 2017     Appointment         Appointment         [facility]   
Sports Medicine and   
Orthopaedics  
Work Phone:   
[phone]  
   
                                                    Start: 2017  
End: 2017     Extremity study     Venous Doppler LE Left [facility]   
Sports Medicine and   
Orthopaedics  
Work Phone:   
[phone]  
   
                                                    Start: 2017  
End: 2017     Appointment         Appointment         [facility]   
Sports Medicine and   
Orthopaedics  
Work Phone:   
[phone]  
   
                                                    Start: 2017  
End: 2017                         Mri jnt of lwr extre   
w/o dye                                 MRI Joint Lower   
Extremity                               [facility]   
Sports Medicine and   
Orthopaedics  
Work Phone:   
[phone]  
   
                                                    Start: 2015  
End: 2015     Follow up Appt 1 week Follow up Appt 1 week [facility]   
Sports Medicine and   
Orthopaedics  
Work Phone:   
[phone]  
   
                                                    Start: 2015  
End: 2015           Shave skin lesion         Shave Excision   
Trunk/Arms/Legs .6-1.0   
cm                                      [facility]   
Sports Medicine and   
Orthopaedics  
Work Phone:   
[phone]  
   
                                                    Start: 2015  
End: 2015           Shave skin lesion         Shave Excision   
Trunk/Arms/Legs <0.6 cm                 [facility]   
Sports Medicine and   
Orthopaedics  
Work Phone:   
[phone]  
   
                                                    Start: 2015  
End: 2015                         Shave skin lesion 0.5   
cm/<                                    Shave Excision Face <0.6   
cm                                      [facility]   
Sports Medicine and   
Orthopaedics  
Work Phone:   
[phone]  
   
                                                    Start: 2015  
End: 2015     Follow Up Appt Other Follow Up Appt Other [facility]  
   
Sports Medicine and   
Orthopaedics  
Work Phone:   
[phone]  
   
                                        Start: 11-   Screening for malign  
ant   
neoplasm of cervix        HPV/Cotest                [facility]  
   
                                        Start: 11-   Hepatitis B Vaccine   
(1   
of 3 - 19+ 3-dose   
series)                                 Hepatitis B Vaccine (1   
of 3 - 19+ 3-dose   
series)                                 [facility]  
   
                                        Start: 11-   Hepatitis B Vaccines  
 (1   
of 3 - 19+ 3-dose   
series)                                 Hepatitis B Vaccines (1   
of 3 - 19+ 3-dose   
series)                                 [facility]  
   
                                        Start: 11-   Urine microalbumin   
profile                   DTAP,TDAP,TD (1 - Tdap)   [facility]  
   
                          Start: 11- HEPATITIS C SCREENING HEPATITIS C [facility]  
   
                          Start: 11- Hepatitis C screening Hepatitis C [facility]  
   
                          Start: 11- HIV SCREENING HIV SCREENING [facility]  
   
                          Start: 11- HIV screening HIV Screening [facility]  
   
                                        Start: 11-   MMR Vaccines (1 of 1  
 -   
Standard series)                        MMR Vaccines (1 of 1 -   
Standard series)                        [facility]  
   
                          Start: 05- COVID-19 VACCINE (#1) COVID-19 VACCI  
NE (#1) [facility]  
   
                                        Start: 1977   HEPATITIS B (1 of 3   
-   
3-dose series)                          HEPATITIS B (1 of 3 -   
3-dose series)                          [facility]  
   
                                        Start: 1977   Hepatitis B Vaccine   
(1   
of 3 - 3-dose series)                   Hepatitis B Vaccine (1   
of 3 - 3-dose series)                   [facility]  
   
                          Start: 1977 HIV screening HIV Screening [facility]  
   
                          Start: 1977 Lipid panel  Lipid Panel  [facility]  
   
                                        Start: 1977   Screening for malign  
ant   
neoplasm of colon                                   [facility]  
   
                          Start: 1977 Yearly Adult Physical Yearly Adult P  
[facility]  
   
                                       Anaerobic Culture Anaerobic Culture [facility]  
Work Phone:   
[phone]  
   
                                       Colonoscopy               [facility]  
Work Phone:   
[phone]  
   
                                       Colonoscopy               [facility]  
   
                                                            CT Abdomen and Pelvi  
s W   
contrast IV                                         [facility]  
   
                                                      
End: 2024                         CT for calcium scoring   
WO contrast and CTA W   
contrast IV Heart and   
coronary arteries                                   [facility]  
Work Phone:   
[phone]  
   
                                        Comment on above:   Once for 1 Occurrenc  
es starting 2024 until 2024   
   
                                                      
End: 2025                         DBT Breast - bilateral   
screening                               [facility]   
Radiology Routine   
Encounter for screening   
mammogram for breast   
cancer 1 Occurrences   
starting 2024   
until 2025                        [facility]  
Work Phone:   
[phone]  
   
                                        Comment on above:   1 Occurrences starti  
ng 2024 until 2025   
   
                                                            DBT Breast - bilater  
al   
screening                               [facility]   
Radiology Routine   
Encounter for screening   
mammogram for breast   
cancer 2024 8:29   
AM EDT                                  [facility]  
Work Phone:   
[phone]  
   
                                                      
End: 2023           EMILY SCREENING [facility]      [facility]   
Radiology Routine   
Encounter for screening   
mammogram for malignant   
neoplasm of breast 1   
Occurrences starting   
10/28/2022 until   
2023                              [facility]  
Work Phone:   
[phone]  
   
                                        Comment on above:   1 Occurrences starti  
ng 10/28/2022 until 2023   
   
                                                            Microbial culture,   
routine                   Wound Culture             [facility]  
Work Phone:   
[phone]  
   
                                                            PAP FLUID CERVICAL   
SCREENING                               PAP FLUID CERVICAL   
SCREENING Lab Routine   
Screening for cervical   
cancer Encounter for   
screening for human   
papillomavirus (HPV)   
Ordered: 10/28/2022                     [facility]  
Work Phone:   
[phone]  
   
                                        Comment on above:   Ordered: 10/28/2022   
   
                                       Patient Education Medications  [facility]  
[facility]   
Sports Medicine and   
Orthopaedics  
Work Phone:   
[phone]  
   
                                       Patient referral              [facility]  
Work Phone:   
[phone]  
   
                                                      
End: 10-           Screening colonoscopy     COLONOSCOPY SCREENING   
Endoscopy Routine Screen   
for colon cancer 1   
Occurrences starting   
10/28/2022 until   
10/28/2023                              [facility]  
Work Phone:   
[phone]  
   
                                        Comment on above:   1 Occurrences starti  
ng 10/28/2022 until 10/28/2023   
   
                                                                 [facility]  
c  
   
                                                                 [facility]  
c  
   
                                                                 [facility]  
  
  
  
  
Immunizations  
  
  
                      Immunization Date Immunization Notes      Care Provider Scooby whitehead  
   
                                        10-          influenza, injectabl  
e,   
quadrivalent,   
preservative free                                   Dr. Krystian Sloan  
Work Phone:   
[phone]                          [facility]  
   
                                        10-          influenza, seasonal,  
   
injectable                                          Dr. Krystian Sloan  
Work Phone:   
[phone]                          [facility]  
   
                                        10-          influenza, seasonal,  
   
injectable,   
preservative free                                           [facility]  
Work Phone:   
[phone]  
   
                                        10-          influenza virus   
vaccine, unspecified   
formulation                                         Shani Sanchez MD  
Work Phone:   
[phone]                          [facility]  
   
                                        2017          influenza, injectabl  
e,   
quadrivalent,   
preservative free                                   Dr. Krystian Sloan  
Work Phone:   
[phone]                          [facility]  
   
                                        2017          influenza, seasonal,  
   
injectable                                          Dr. Krystian Sloan  
Work Phone:   
[phone]                          [facility]  
   
                                        2017          influenza, seasonal,  
   
injectable,   
preservative free                                           [facility]  
Work Phone:   
[phone]  
   
                                        2017          tetanus toxoid,   
reduced diphtheria   
toxoid, and acellular   
pertussis vaccine,   
adsorbed                                                    [facility]  
   
                                        10-          influenza injectabl  
e,   
quadrivalent,   
preservative free                                   Dr. Krystian Sloan  
Work Phone:   
[phone]                          [facility]  
   
                                        10-          influenza, seasonal,  
   
injectable                                          Dr. Krystian Sloan  
Work Phone:   
[phone]                          [facility]  
   
                                        10-          influenza, seasonal,  
   
injectable,   
preservative free                                           [facility]  
Work Phone:   
[phone]  
   
                                        10-          influenza, injectabl  
e,   
quadrivalent,   
preservative free                                   Dr. Krystian Sloan  
Work Phone:   
[phone]                          [facility]  
   
                                        10-          influenza, seasonal,  
   
injectable                                          Dr. Krystian Sloan  
Work Phone:   
[phone]                          [facility]  
   
                                        10-          influenza, seasonal,  
   
injectable,   
preservative free                                           [facility]  
Work Phone:   
[phone]  
   
                                        10-          influenza, injectabl  
e,   
quadrivalent,   
preservative free                                   Dr. Krystian Sloan  
Work Phone:   
[phone]                          [facility]  
   
                                        10-          influenza, seasonal,  
   
injectable                                          Dr. Krystian Sloan  
Work Phone:   
[phone]                          [facility]  
   
                                        10-          influenza, seasonal,  
   
injectable,   
preservative free                                           [facility]  
Work Phone:   
[phone]  
   
                                        10-          Influenza virus   
vaccine                                             Dr. Krystian Sloan  
Work Phone:   
[phone]                          [facility]  
   
                                        10-          influenza, seasonal,  
   
injectable,   
preservative free                                           [facility]  
Work Phone:   
[phone]  
  
  
  
Payers  
  
  
                          Date         Payer Category Payer        Policy ID  
   
                          2024   Self-pay                  [id number]  
[id number]  
   
                          2023   Unknown                   [id number]  
[id number]  
   
                          2021   Unknown                   [id number]  
[id number]  
   
                          2017   Unknown                   [id number]  
[id number]  
   
                          1977   Unknown                   [id number] 2.16.84  
[id number]  
   
                          1977   Unknown                   [id number] 2.16.84  
[id number]  
   
                          1977   Unknown                   [id number] 2.16.84  
[id number]  
   
                          1977   Unknown                   [id number] 2.16.84  
[id number]  
   
                          1977   Unknown                   [id number] 2.16.84  
[id number]  
   
                          1977   Unknown                   [id number] 2.16.84  
[id number]  
   
                          1977   Unknown                   [id number] 2.16.84  
[id number]  
   
                          1977   Unknown                   [id number] 2.16.84  
[id number]  
   
                          1977   Unknown                   [id number] 2.16.84  
[id number]  
   
                          1977   Unknown                   [id number] 2.16.84  
[id number]  
   
                          1977   Unknown                   [id number] 2.16.8  
[id number]  
   
                          1960   Unknown                   [id number]  
   
                                       Unknown                   [id number]  
[id number]  
   
                                       Unknown                   [id number] 2.16.8  
[id number]  
   
                                       Unknown                   [id number] 2.16.8  
[id number]  
   
                                       Unknown                   [id number] 2.16.8  
[id number]  
   
                                       Unknown                   [id number] 2.16.8  
[id number]  
   
                                       Unknown                   [id number] 2.16.8  
[id number]  
   
                                       Unknown                   [id number] 2.16.8  
[id number]  
   
                                       Unknown                   [id number] 2.16.8  
[id number]  
   
                                       Unknown                   [id number] 2.16.8  
[id number]  
   
                                       Unknown                   [id number] 2.16.8  
[id number]  
  
  
  
Social History  
  
  
                          Date         Type         Detail       Facility  
   
                                                    Start: 2021  
End: 2024                         Tobacco smoking   
status NHIS                             Unknown if ever   
smoked                                  [facility]  
   
                                        Start: 1975   Sex Assigned At   
Birth                     Female                    [facility]  
   
                                        Start: 10-   Tobacco smoking   
status NHIS               Never smoked tobacco      [facility]  
   
                                        Start: 10-   Tobacco use and   
exposure                                Smokeless tobacco   
non-user                                [facility]  
   
                                                    Start: 10-  
End: 2024           Alcohol intake            Current drinker of   
alcohol (finding)                       [facility]  
   
                                        Start: 1975   Sex Assigned At   
Birth                     Not on file               [facility]  
   
                                                    Start: 2022  
End: 2024                         Exposure to   
SARS-CoV-2 (event)        Not sure                  [facility]  
Work Phone:   
[phone]  
   
                                                    Start: 10-  
End: 2024                         History of Social   
function                                            [facility]  
   
                                                    Start: 10-  
End: 2024     Tobacco use panel                       [facility]  
   
                                                            National Score   
(1-100), lower   
number is lower risk      56                        [facility]  
   
                                                    NEGATED: Highlighted   
row                                     pregnant            [facility]  
  
  
  
Goals  
  
  
                                Date            Patient Goal    Desired Activity  
/State  
   
                                                                  
  
  
  
Mental Status  
  
  
                          Date         Assessment   Result       Facility  
   
                          2022   Cognitive function Voice/Name   [facility]  
Work Phone: [phone]  
  
  
  
Clinical Notes 2022 to 2024  
           Letter - Coordinator, Mammography - 2024 8:44 AM EDT  
  
                                Note Date & Type Note            Facility  
   
                                        2024 Note     [facility]  
Medical Records Department  
[address] [place] [address]  
  
History Physical Exam  
24 0603  
MR#: [id number] Acct: [id number]  
Name: MAGGY CARPIO Rep #:   
[id number]  
: 1975 48 From: Faye Valero MD  
PCP: Dr. Jose Sloan MD   
Status:REG SDC  
  
Location: [place] [address]  
  
  
  
  
History and Physical  
Date of Admission: 24  
Allergies  
  
No Known Allergies Allergy (Verified   
24 15:35)  
  
  
Medications  
  
cholecalciferol (vitamin D3) 125 mcg   
(5,000 unit) capsule 125 mcg PO DAILY   
22 [History  
Confirmed 24]  
magnesium glycinate 100 mg tablet 100   
mg PO DAILY 24 [History   
Confirmed 24]  
  
  
  
PFSH  
Medical History (Reviewed 24 @   
15:34 by Kate Potts)  
  
Anxiety  
Hand, foot and mouth disease  
Heartburn  
History of drainage of abscess (   
2022)  
History of IBS  
History of pain when walking  
History of steroid therapy  
Migraine headache  
Non-smoker  
Pilonidal abscess of  cleft  
Wears glasses  
  
  
Surgical History (Reviewed 24 @   
15:34 by Kate Potts)  
  
History of ankle surgery  
History of arthroscopy of left knee  
History of colonoscopy  
History of esophagogastroduodenoscopy   
(EGD)  
History of incision and drainage  
History of laparoscopic   
cholecystectomy  
History of surgical removal of   
ganglion cyst  
History of umbilical hernia repair  
  
  
Family History (Reviewed 24 @   
15:34 by Kate Potts)  
Father  
Colon polyps  
Diabetes  
AAA (abdominal aortic aneurysm)  
HypertensionMother  
Colon polyps  
Neuropathy  
  
Social History (Reviewed 24 @   
15:34 by Kate Potts)  
Smoking Status: Never smoker  
  
  
  
HPI  
HPI  
HPI:  
Patient is a 47 y/o F I am seeing for   
an update history and physical for an   
upcoming elective right  
possible bilateral inguinal hernia   
repair with mesh. Patient denies any   
recent hospitalization or  
illnesses. Patient denies denies any   
medication changes. She denies any   
compilations or side effects  
previously from anesthesia. Patient   
notes a previous history of umbilical   
hernia repair. Patient  
denies any cardiac or pulmonary   
concerns. She does not follow with a   
cardiologist. She is not  
currently on any blood thinners.  
  
Patient's previous history per Dr. Valero:  
48-year-old female. I have previously   
assisted her with a pilonidal abscess   
which had quite a  
delayed healing and required multiple   
office visits.She now presents with   
concerns regarding a  
possible hernia. The patient states   
that but tickly when she is standing   
that she is noted which  
she is suggesting some fatty fullness   
in the right medial groin area. She   
does not correlate this  
with any particular injury or   
accident. She likes running and is   
scheduled to do a running event in  
May. She is always able to get the   
area to reduce and actually can feel   
the area reduced when she  
lies supine.  
  
She has had a previous laparoscopic   
cholecystectomy. She has had a remote   
umbilical herniorrhaphy  
in childhood. No report of mesh in   
that location.  
  
She has been having some right lower   
quadrant discomfort which she always   
attributed to her right  
ovary.  
  
Upon self-examination she is wondering   
whether she has weakness on the left   
as well  
  
  
ROS  
General  
General: No weight change, appetite,   
fatigue, colon cancer, breast cancer   
or weakness  
HEENT  
HEENT: No difficulty swallowing, eye   
injury, eye surgery, swollen glands or   
hoarseness  
Endo  
Endocrine: No thyroid disease,   
diabetes mellitus, thyroid cancer,   
Hair loss, heat intolerance or  
cold intolerance  
Skin  
Skin: No rash or changing moles  
Breast  
Breast: No left breast lump, right   
breast lump, nipple discharge, breast   
pain, abnormal mammogram,  
abnormal US or breast enlargement  
Musc  
Musculoskeletal: No back problems,   
arthritis, rheumatoid arthritis, gout   
or joint pain  
Cardio  
Cardiovascular: No murmur, pacemaker,   
heart disease, atrial fibrillation,   
high blood pressure, heart  
attack, heart stent, palpitations,   
shortness of breat with exertion or   
chest pain  
Psych  
Psychiatric: No depression, anxiety or   
hearing voices  
Resp  
Respiratory: No shortness of breath,   
No sleep apnea, No cough, No COPD, No   
asthma, No emphysema and  
No wheezing  
Gastro  
Gastrointestinal: No abdominal pain,   
No nausea or vomiting, No diarrhea, No   
constipation, No blood  
in stool, No acid reflux, No   
hemorrhoids, No ulcers, No gallbladder   
problem and No black,tarry  
stools  
Hema  
Hematologic: No blood thinners, No   
blood disorders, No bleeding, No   
anemia and No blood clots  
Neuro  
Neurologic: No system reviewed and no   
additional complaints, except as   
documented, No as per HPI, No  
abnormal gait, No abnormal hearing, No   
abnormal movements, No abnormal   
speech, No behavioral  
changes, No burning sensations, No   
confusion, No convulsions, No   
disequilibrium, No dizziness, No  
localized weakness, No frequent falls,   
No headache(s), No lack of   
coordination, No loss of vision,  
No memory loss, No n (more content not   
included)...                            [facility]  
   
                                                    2024 Miscellaneous   
Notes                                   Formatting of this note might be   
different from the original.  
2024  
  
PID: [id number] Maggy Carpio  
[address]  
[place] [address]  
  
Dear Ms. Carpio,  
  
We are pleased to inform you that the   
results of your recent breast imaging   
exam on 2024 are normal.  
  
Your mammogram demonstrates that you   
have dense breast tissue, which could   
hide abnormalities. Dense breast   
tissue, in and of itself, is a   
relatively common condition.   
Therefore, this information is not   
provided to cause undue concern;   
rather, it is to raise your awareness   
and promote discussion with your   
health care provider regarding the   
presence of dense breast tissue in   
addition to other risk factors.  
  
Early detection of cancer is very   
important. We also understand   
recommendations regarding breast   
cancer screening are controversial.   
Please discuss with your primary care   
provider which strategy is best for   
you and whether a mammogram is right   
for you.  
  
Your imaging studies and report will   
be kept on file at [facility] as   
part of your permanent medical record   
and are available for your continuing   
care.  
  
Thank you for allowing us to help in   
meeting your health care needs.  
  
Sincerely,  
  
Dr. Schmidt  
Interpreting Radiologist  
[facility]  
  
(Normal over 40)  
Electronically signed by Coordinator,   
Mammography at 2024 8:44 AM EDT  
documented in this encounter            [facility]  
  
  
  
                                        2024 Progress note   
  
  
  
   
                                           
   
                                        Note Date/Time      2024   
12:26pm  
   
                                                    [facility]  
[facility]  
[facility]  
[address] [place] [address]. [address]  
[place] [address]  
[phone]  
  
OFFICE VISIT  
Date of Service: 24  
  
MR#: [id number] Acct: [id number]  
  
Name: MAGGY CARPIO Rep #: [id number]  
[id number]  
: 1975 Provider: Dr. Aden  
rt MARIO Valero MD  
Age/Sex: 48/F Location: [facility]  
  
Status: Signed  
  
Intake  
Vital Signs  
  
  
24  
15:12  
  
Height 5 ft 10 in  
  
/84 H  
  
Blood Pressure Location Rt brachial  
  
Position Sitting  
  
Respiration 16  
  
  
Intake  
Visit Reasons: I&D of pilonidal cyst  
Chief Complaint: inflamed pilonidal cyst  
 Required: No  
Accompanied by:   
Is patient in pain?: No  
Allergies  
  
No Known Allergies Allergy (Verified 24 12:24)  
  
  
  
Medications  
  
cholecalciferol (vitamin D3) 125 mcg (5,000 unit) capsule   
125 mcg PO DAILY 22 [History Confirmed 24]  
magnesium glycinate 100 mg tablet 100 mg PO DAILY   
24 [History Confirmed 24]  
  
  
  
PFSH  
Medical History (Reviewed 24 @ 12:23 by April Aguilar)  
  
Abscess  
Anxiety  
Hand, foot and mouth disease  
Heartburn  
History of drainage of abscess (~2022)  
History of IBS  
Migraine headache  
Non-smoker  
Pilonidal abscess of rubi cleft  
  
  
Surgical History (Reviewed 24 @ 12:23 by April Aguilar)  
  
History of ankle surgery  
History of arthroscopy of left knee  
History of colonoscopy  
History of esophagogastroduodenoscopy (EGD)  
History of incision and drainage  
History of laparoscopic cholecystectomy  
History of surgical removal of ganglion cyst  
History of umbilical hernia repair  
  
  
Family History (Reviewed 24 @ 12:23 by April Aguilar)  
Father Colon polyps  
Diabetes  
AAA (abdominal aortic aneurysm)  
Hypertension  
Mother Colon polyps  
Neuropathy  
  
  
Social History (Reviewed 24 @ 12:23 by April Aguilar)  
Smoking Status: Never smoker  
  
  
  
HPI  
HPI  
HPI:  
Patient is 1 year out status post resolving what appeared   
to be a pilonidal abscess. She has just a small amount of   
redness in the even more smaller amountof little pustule  
  
Exam  
Skin  
Other:  
With nursing present the sacrococcygeal area was   
inspected. 3 mm to the right of the midline there is   
minimal erythema and a 1 mm whitish discoloration.  
  
Office Procedures  
Procedure Time Out  
Time Out  
Informed consent given: Yes  
Consent signed: Yes  
Time out checklist: patient, procedure, site   
marked/identified, positioning of patient, supplies   
available, allergies confirmed and team agrees on   
procedure  
Time out staff in room: Yes  
Time out verified: Yes  
Time out date: 24  
Time out time: 12:25  
  
  
Assessment and Plan  
Assessment and Plan  
(1) Pilonidal abscess of rubi cleft:  
Status: Acute  
Plan:  
The area of concern was very diminutive. I had to use a   
bright light to inspectit. We did cleanse it with some   
Betadine use some local and I used splinter forceps to   
inspect the area a extraordinary minimal amount of pus   
was encountered that went from the right to the midline.   
I unroofed it for 3 mm. Did notappear to have a depth   
more than 3 mm. No further tracking. Treated with silver   
nitrate. I expect it to resolve. She will notify me if   
she has any difficulties and they may use silver nitrate   
as needed at home.  
  
Faye Valero M.D., F.A.C.S.  
  
Coding  
Level of Care Code  
Off vis,est,level 2  
  
Diagnoses  
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  
  
  
24 1250 <Electronically signed by Faye omer MD>  
Date ___________ __________________________________  
_  
Faye Valero MD  
  
  
  
  
  
  
Cosigner Signature: Date ___________   
_____________________  
______________  
(if applicable)  
  
  
CC: ~  
   
  
[facility]  
Work Phone: [phone]- NoteHNO ID: [id number]  
Author: SHANI HOUSE MD  
Service: ?  
Author Type: Physician  
Type: Progress Notes  
Filed: 2024 09:54  
Note Text:  
Chaperone offered: Patient declinesBreann Smart is a 48 year old  who presents for an annual gynecologic exam with  
complaints, irregular bleeding and perimenopausal symptoms. Not sleeping as  
well- more hot flashes/night sweats. 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  
Menses: Irregular- sometimes Skips 2 months now- 4-5 flow.  
Contraception: vasectomy  
HPV vaccine: No  
Last Pap: 2022 normal  
HPV: 11/3/2022 negative  
History of abnormal pap: No  
Last mammogram: normal  
Sexually active: Yes  
History of STDS: None  
Patient concerns for STD exposure: No.  
Pain with intercourse: No  
Postcoital bleeding: No  
Exercise: Personal training with Stephanie Allen 2 x week  
Diet: balanced  
OB History  
 T0  L0  
SAB0 IAB0 Ectopic0 Multiple0 Live Births0  
Gyn History  
LMP: 2024, Having periods  
Age at Menarche:  
Age at First Pregnancy:  
Age at Menopause:  
Gyn History Comments:  
Sexual Activity: Yes; Male  
Contraception: Vasectomy  
PAST MEDICAL HISTORY  
Diagnosis Date  
Lactose intolerance  
Raynaud disease  
PAST SURGICAL HISTORY  
Procedure Laterality Date  
ANKLE ARTHROSCOPY Right 2018  
COLONOSCOPY W/BIOPSY SINGLE/MULTIPLE 13  
EGD TRANSORAL BIOPSY SINGLE/MULTIPLE 13  
KNEE ARTHROSCOPY Left 2017  
LAP MARVA W EXPLORATION OF CD 2012  
PAST SURGICAL HISTORY OF  
ganglion cyst on wrist x3  
REPAIR UMBILICAL HERNIA 1982  
SKIN BX, 1 LESION 14  
Punch bx right gluteal fold skin lesion  
FAMILY HISTORY  
Problem Relation Age of Onset  
Diabetes Father  
other (myasthenia gravis) Father  
SOCIAL HISTORY  
Social History  
Tobacco Use  
Smoking status: Never  
Smokeless tobacco: Never  
Vaping Use  
Vaping Use: Never used  
Substance Use Topics  
Alcohol use: Yes  
Comment: 3x monthly  
Drug use: Never  
REVIEW OF SYSTEMS  
Abdomen: No abdominal pain, nausea, vomiting, diarrhea, or constipation. No  
bloating, early satiety, indigestion, or increased flatulence.  
Bladder: No dysuria, gross hematuria, urinary frequency, urinary urgency, or  
incontinence.  
Breast: No breast lumps, nipple d/c, overlying skin changes, redness or skin  
retraction.  
Allergies and current medication updated:Yes  
EXAM: /78   Ht 5' 10  (1.78m)   Wt 243 lb (110.2kg)   LMP 2024   BMI  
34.87 kg/(m2).  
GENERAL: pleasant,  female in no apparent distress  
HEENT: Normocephalic, atraumatic, mucus membranes moist, and no lesions  
NECK: Supple, full range of motion, no adenopathy, and thyroid normal  
DERMATOLOGY: Normal, without lesions, non-icteric, and non-hirsute  
BREAST: soft, non-tender, symmetric, no dominant mass, normal nipple-areolar  
complex, no lymphadenopathy, and no nipple discharge  
ABDOMEN: soft, non-tender, and no masses  
PELVIC: external genitalia normal, normal Bartholin's glands, urethra, Elizabeth's  
glands, no vulvar lesions, no cervical lesions, good vaginal support,  
physiologic discharge present, normal appearing perineal body and perianal  
region  
BIMANUAL: uterus normal size, shape and consistency, no adnexal masses, and  
non-tender  
RECTOVAGINAL: deferred.  
NEURO: alert and oriented x3,exam grossly non-focal  
EXTREMITIES: normal  
ASSESSMENT/PLAN:  
1) Health maintenance:  
Pap/HPV up to date.  
Mammogram ordered.  
Nutrition, exercise and routine health maintenance exams reviewed.  
2) Contraception: vasectomy. Contraceptive options reviewed and information  
provided.  
3) STD screening: Declined STD check.  
4) Follow up one year or sooner as needed  
5) reviewed options for perimenopausal symptoms hormonal vs non hormonal.  
[facility] 
Present illness Narrative* Shani House MD - 2024 8:48 AM EDT
  Formatting of this note is different from the original.  
Chaperone offered: Patient declines.  
  
Maggy is a 48 year old  who presents for an annual gynecologic exam with 
complaints, irregular bleeding and perimenopausal symptoms. Not sleeping as 
well- more hot flashes/night sweats. 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  
  
Menses: Irregular- sometimes Skips 2 months now- 4-5 flow.  
Contraception: vasectomy  
HPV vaccine: No  
Last Pap: 2022 normal  
HPV: 11/3/2022 negative  
History of abnormal pap: No  
Last mammogram: normal  
Sexually active: Yes  
History of STDS: None  
Patient concerns for STD exposure: No.  
Pain with intercourse: No  
Postcoital bleeding: No  
Exercise: Personal training with [employer] Allen 2 x week  
Diet: balanced  
  
OB History  
 T0  L0  
SAB0 IAB0 Ectopic0 Multiple0 Live Births0  
  
Gyn History  
  
LMP: 2024, Having periods  
Age at Menarche:  
Age at First Pregnancy:  
Age at Menopause:  
Gyn History Comments:  
Sexual Activity: Yes; Male  
Contraception: Vasectomy  
  
  
  
PAST MEDICAL HISTORY  
Diagnosis Date  
Lactose intolerance  
Raynaud disease  
  
PAST SURGICAL HISTORY  
Procedure Laterality Date  
ANKLE ARTHROSCOPY Right 2018  
COLONOSCOPY W/BIOPSY SINGLE/MULTIPLE 13  
EGD TRANSORAL BIOPSY SINGLE/MULTIPLE 13  
KNEE ARTHROSCOPY Left 2017  
LAP MARVA W EXPLORATION OF CD 2012  
PAST SURGICAL HISTORY OF  
ganglion cyst on wrist x3  
REPAIR UMBILICAL HERNIA 1982  
SKIN BX, 1 LESION 14  
Punch bx right gluteal fold skin lesion  
  
FAMILY HISTORY  
Problem Relation Age of Onset  
Diabetes Father  
other (myasthenia gravis) Father  
SOCIAL HISTORY  
Social History  
  
Tobacco Use  
Smoking status: Never  
Smokeless tobacco: Never  
Vaping Use  
Vaping Use: Never used  
Substance Use Topics  
Alcohol use: Yes  
Comment: 3x monthly  
Drug use: Never  
  
REVIEW OF SYSTEMS  
Abdomen: No abdominal pain, nausea, vomiting, diarrhea, or constipation. No 
bloating, early satiety, indigestion, or increased flatulence.  
Bladder: No dysuria, gross hematuria, urinary frequency, urinary urgency, or 
incontinence.  
Breast: No breast lumps, nipple d/c, overlying skin changes, redness or skin 
retraction.  
Allergies and current medication updated:Yes  
  
EXAM: /78   Ht 5' 10  (1.78m)   Wt 243 lb (110.2kg)   LMP 2024   BMI
34.87 kg/(m^2).  
  
  
GENERAL: pleasant,  female in no apparent distress  
HEENT: Normocephalic, atraumatic, mucus membranes moist, and no lesions  
NECK: Supple, full range of motion, no adenopathy, and thyroid normal  
DERMATOLOGY: Normal, without lesions, non-icteric, and non-hirsute  
BREAST: soft, non-tender, symmetric, no dominant mass, normal nipple-areolar 
complex, no lymphadenopathy, and no nipple discharge  
ABDOMEN: soft, non-tender, and no masses  
PELVIC: external genitalia normal, normal Bartholin's glands, urethra, Elizabeth's 
glands, no vulvar lesions, no cervical lesions, good vaginal support, 
physiologic discharge present, normal appearing perineal body and perianal 
region  
BIMANUAL: uterus normal size, shape and consistency, no adnexal masses, and 
non-tender  
RECTOVAGINAL: deferred.  
NEURO: alert and oriented x3,exam grossly non-focal  
EXTREMITIES: normal  
  
ASSESSMENT/PLAN:  
1) Health maintenance:  
Pap/HPV up to date.  
Mammogram ordered.  
Nutrition, exercise and routine health maintenance exams reviewed.  
2) Contraception: vasectomy. Contraceptive options reviewed and information 
provided.  
3) STD screening: Declined STD check.  
4) Follow up one year or sooner as needed  
5) reviewed options for perimenopausal symptoms hormonal vs non hormonal.  
  
Shani Walsh MD  
  
Electronically signed by Shani House MD at 2024 9:54 AM EDT  
  
documented in this encounterSelect Medical [facility]03- NoteHNO ID: [id number]  
Author: KRYSTA ALVARADO RT(CLYED)  
Service: ?  
Author Type: Technician  
Type: Progress Notes  
Filed: 2024 15:33  
Note Text:  
Radiology Service Progress Note  
PATIENT NAME: Maggy Carpio  
MRN: [id number]  
DATE OF SERVICE: 2024  
TIME: 3:33 PM  
PATIENT IDENTITY VERIFICATION COMPLETED USING TWO (2) IDENTIFIERS: Name and  
Date of Birth confirmed by patient verbally.  
FALL SCREENING: Has the patient had 2 falls in the last year or 1 fall with  
injury or currently using an Ambulatory Assistive Device (Walker, Cane,  
Wheelchair, Crutches, etc.)? No  
PATIENT GENDER DATA: Female. Pregnancy status: Pregnant: No Breastfeeding  
status: NO.  
PATIENT RELEVANT IMPLANT DATA REVIEWED: Yes  
PATIENT PRESENTS WITH AN IMPLANTABLE OR ATTACHED MEDICAL DEVICE: No  
RADIOLOGY DEPARTMENT: CT; Exam(s) Completed: Pelvis  
PERIPHERAL IV DATA: Not applicable  
SIGNED BY: RT Murphy(CLYDE)  
2024 3:33 [facility]03- History of Present 
illness Narrative* Krysta Alvarado RT(R) - 2024 3:20 PM EST
  Formatting of this note might be different from the original.  
Radiology Service Progress Note  
  
PATIENT NAME: Maggy Carpio  
MRN: [id number]  
  
DATE OF SERVICE: 2024  
TIME: 3:33 PM  
PATIENT IDENTITY VERIFICATION COMPLETED USING TWO (2) IDENTIFIERS: Name and Date
of Birth confirmedby patient verbally.  
FALL SCREENING: Has the patient had 2 falls in the last year or 1 fall with 
injury or currently using an Ambulatory Assistive Device (Walker, Cane, 
Wheelchair, Crutches, etc.)? No  
PATIENT GENDER DATA: Female. Pregnancy status: Pregnant: No Breastfeeding 
status: NO.  
PATIENT RELEVANT IMPLANT DATA REVIEWED: Yes  
PATIENT PRESENTS WITH AN IMPLANTABLE OR ATTACHED MEDICAL DEVICE: No  
  
RADIOLOGY DEPARTMENT: CT; Exam(s) Completed: Pelvis  
  
PERIPHERAL IV DATA: Not applicable  
  
SIGNED BY: RT Murphy(CLYDE)  
2024 3:33 PM  
  
  
Electronically signed by Krysta Alvarado RT(R) at 2024 3:33 PM EST  
  
documented in this encounterSelect Medical Cleveland Clinic [facility], [facility]02- Miscellaneous Notes* 
  Telephone Encounter - Karina Rm RN - 2023 1:43 PM ESTFormatting 
  of this note might be different from the original.  
Lab order faxed to [facility]  
Electronically signed by Karina Rm RN at 2023 1:43 PM EST  
  
* Telephone Encounter - Shani Sanchez MD - 2023 1:12 PM EST
  Formatting of this note might be different from the original.  
Dx codes placed- order in box ready to be faxed.  
Electronically signed by Shani Sanchez MD at 2023 1:12 PM EST  
  
* Telephone Encounter - Shani Sanchez MD - 2023 10:50 AM EST
  Formatting of this note might be different from the original.  
What recent change so I can put a diagnosis code for the labs?  
Electronically signed by Shani Sanchez MD at 2023 10:50 AM EST  
  
* Telephone Encounter - Karina Rm RN - 2023 9:58 AM ESTFormatting 
  of this note might be different from the original.  
Lab order sheet to DM if agreeable  
Electronically signed by Karina Rm RN at 2023 10:05 AM EST  
  
documented in this encounterSelect Medical Cleveland Clinic [facility]01- Progress note  
  
  
  
  
                                        Author              Dr. Valero  
[facility]  
2023 4:55pm  
   
                                        Note Date/Time      2023 4  
:14pm  
   
                                                    [facility]  
[facility]  
[address] [place] [address]. [address]  
[place] [address]  
[phone]  
  
OFFICE VISIT  
Date of Service: 23  
  
MR#: [id number] Acct: [id number]  
  
Name: MAGGY CARPIO Rep #: [id number]  
[id number]  
: 1975 Provider: Dr. Markie Valero MD  
Age/Sex: 47/F Location: [facility]  
  
Status: Signed  
  
Intake  
Vital Signs  
  
  
22  
07:10  
  
Height 5 ft 10 in  
  
  
Intake  
Visit Reasons: PILONIDAL CYST  
Chief Complaint: Check Pilonidal Abscess  
 Required: No  
Allergies  
  
No Known Allergies Allergy (Verified 23 16:11)  
  
  
  
Medications  
  
B-complex with vitamin C 1 ea PO DAILY 17 [History Confirmed 23]  
cyanocobalamin (vitamin B-12) 500 mcg tablet (Vitamin B-12) 800 mcg PO DAILY 
17   
[History Confirmed 23]  
omega-3 fatty acids-fish oil 340 mg-1,000 mg capsule (Fish Oil) 2 ea PO DAILY   
17 [History Confirmed 23]  
mupirocin 2 % topical ointment 1 applic topical .QDAILY #2 tubes 18 [Rx   
Confirmed 23]  
ascorbate calcium (vitamin C) 500 mg tablet 500 mg PO DAILY 22 [History   
Confirmed 23]  
ashwagandha root extract 300 mg capsule 300 mg PO DAILY 22 [History 
Confirmed   
23]  
cholecalciferol (vitamin D3) 125 mcg (5,000 unit) capsule 125 mcg PO DAILY 
22   
[History Confirmed 23]  
magnesium oxide 400 mg PO DAILY 22 [History Confirmed 23]  
zinc gluconate 30 mg tablet 30 mg PO DAILY 22 [History Confirmed 23]  
  
  
  
Subjective  
Details:  
Patient complains of very recent onset of recurrent shortness at the pilonidal 
I&D   
site.  
  
Objective  
Details:  
Right of the midline at the previous I&D site there appears to be erythema and   
fluctuance. I recommended to the patient that we perform a repeat I&D.  
  
She was taken to the procedure room placed prone on the table. Cristi midline 
raphae   
sacrococcygeal area is prepped with Betadine. 1% lidocaine mixed 50-50 with 0.5%
  
Marcaine was used as local anesthetic. 11 blade was used to reopen atthe 
previous I&D   
site. Small amount of liquidy purulence emanated there from. I then did some 
sharp   
scissor debridement of some tissue. Placed some gauze. She tolerated procedure 
well  
  
She will now initiate once daily quarter inch Nu Gauze packing. We will see 
herin the   
office next week.  
  
Faye Valero M.D., F.A.C.S.  
  
Coding  
Level of Care Code  
Global Post Op  
  
Diagnoses  
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  
  
[facility]  
Medical History (Updated 23 @ 16:12 by April Aguilar)  
  
Abscess  
Anxiety  
Hand, foot and mouth disease  
Heartburn  
History of drainage of abscess (~2022)  
History of IBS  
Migraine headache  
Non-smoker  
Pilonidal abscess of  cleft  
  
  
Surgical History (Updated 23 @ 16:14 by April Aguilar)  
  
History of ankle surgery  
History of arthroscopy of left knee  
History of colonoscopy  
History of esophagogastroduodenoscopy (EGD)  
History of incision and drainage  
History of laparoscopic cholecystectomy  
History of surgical removal of ganglion cyst  
History of umbilical hernia repair  
  
  
Family History (Reviewed 23 @ 16:12 by April Aguilar)  
Father Colon polyps  
Diabetes  
AAA (abdominal aortic aneurysm)  
Hypertension  
Mother Colon polyps  
Neuropathy  
  
  
Social History (Reviewed 23 @ 16:12 by April Aguilar)  
Smoking Status: Never smoker  
  
  
  
Assessment and Plan (No Qualifiers)  
Assessment and Plan  
(1) Pilonidal abscess of rubi cleft:  
Status: Acute  
Plan:  
Recurrent abscess of the rubi cleft I&D . I am concerned that she may require 
future   
more radical resection  
  
We will resume oral antibiotic therapy.  
  
Faye Valero M.D., F.A.C.S.  
  
  
[id number] <Electronically signed by Faye omer MD>  
Date ___________ __________________________________  
_  
Faye Valero MD  
  
  
  
  
  
  
Cosigner Signature: Date ___________ _____________________  
______________  
(if applicable)  
  
  
CC: ~  
   
  
[facility]  
Work Phone: [phone]- Miscellaneous Notes* Telephone Encounter - 
  Tabatha Brooks RN - 2023 1:36 PM ESTFormatting of this note might be 
  different from the original.  
Order faxed to [facility] radiology.  
Tabatha Brooks RN  
  
Electronically signed by Tabatha Brooks RN at 2023 1:36 PM EST  
  
* Telephone Encounter - Shani Sanchez MD - 2023 12:27 PM EST
  Formatting of this note might be different from the original.  
ordered  
Electronically signed by Shani Sanchez MD at 2023 12:28 PM EST  
  
* Telephone Encounter - Tabatha Brooks RN - 2023 11:47 AM ESTFormatting of
  this note might be different from the original.  
DM reviewed breast imaging report from [facility]. Called patient and she
is aware of needing 6 month follow up diagnostic imaging of left breast. Order 
form to DM to sign. Will fax to [facility] once signed.  
Tabatha Brooks RN  
  
Electronically signed by Tabatha Brooks RN at 2023 11:54 AM EST  
  
documented in this encounterSelect Medical Cleveland Clinic [facility], [facility]10- Instructions* Patient 
  Instructions*   
  
Shani Sanchez MD - 10/28/2022 9:16 AM EDT  
  
Formatting of this note is different from the original.  
Images from the original note were not included.  
  
  
Miralax/Dulcolax Bowel Prep  
For this bowel preparation, you will need to purchase the following medications 
at any pharmacy:  
Over the counter Miralax (generic name is polyethylene glycol) 8.3 oz or 238 
grams  
Four (4) Dulcolax (generic name is Bisacodyl) tablets  
3 days prior to your procedure, you need to be on a low fiber diet (Such as 
popcorn, beans, seeds, nuts, salad and raw vegetables, corn, fresh and dried 
fruit and multi-grain bread)  
YOU MUST BE ON CLEAR LIQUIDS FOR 2 FULL DAYS PRIOR TO YOUR COLONOSCOPY  
  
_____________Day one which would be two days before your colonoscopy, you will 
need to be on clear liquids all day.  
You may have coffee or tea-black only (no cream), clear broths (beef, chicken or
vegetable), apple juice, white grape juice, pop, Gatorade, Powerade, lemonade, 
Jello, popsicles, Michael-aid, and water-But nothing red or dark purple in color 
and no dairy products, tomato or orange juices.  
  
_____________Day two which would be the day before your colonoscopy continue 
clear liquids all day as above. And follow the instructions below:  
  
8:00 AM - Mix the Miralax with 64 oz of Gatorade or another clear liquid of 
choice and place in refrigerator. Most people say the drink is better cold.  
4:00 PM - Take 2 of the Dulcolax tablets with 8 oz of water.  
6:00 PM - Start to drink the Miralax mixture. You must finish it by midnight.  
8:00 PM - Take the other 2 Dulcolax tablets with 8 oz of water.  
You may continue to drink clear liquids while you are taking your prep and after
you finish it as long as it is before midnight. Drink lots of fluids so you don 
t become dehydrated.  
  
Nothing to drink after midnight the night before the procedure unless you are 
instructed differently by the physician or nurses. Please remember to take your 
normal medications the morning of the procedure with a small sip of water 
especially your blood pressure medications. If you are diabetic, you need to 
contact your physician about how to take your diabetic medications and/or 
insulin during the prepping period and the day of your procedure.  
  
Any questions please call:  
Dr. Betts or Dr. Rich [phone]  
Araceli Jauregui [phone]  
Dr. Holley [phone]  
[facility] nurses [phone]  
  
  
Electronically signed by Shani Sanchez MD at 10/28/2022 9:16 AM EDT  
  
  
  
documented in this encounterSelect Medical Cleveland Clinic [facility]10- History of Present 
illness Narrative* Shani Sanchez MD - 10/28/2022 8:07 AM EDT
  Formatting of this note is different from the original.  
Maggy is a 46 year old  who presents for an annual gynecologic exam with 
questions about premenopausal symptoms and incontinence issues. Working at 
[employer] as surgical tech.  
  
Menses: Becoming more irregular the past 6 months. Menses occurring more 
frequently, sometimes 2x/month. Lasts 3-5 days. [facility]ighter flow than previous 
[facility]  
Contraception: vasectomy  
HPV vaccine: No  
Last Pap: 2018 normal  
HPV: 2018 negative  
History of abnormal pap: No  
Last mammogram:  normal  
Sexually active: Yes  
History of STDS: None  
Patient concerns for STD exposure: No.  
Pain with intercourse: No  
Postcoital bleeding: No  
Hot flashes: Yes, 5-10x/month for last 6 months  
Night sweats: No  
Vaginal dryness: No  
Mood swings: Yes, more prominent in last 6 months  
Exercise: 3 times a week for 45-60 minutes. Type: R 2nd metatarsal Fx. So mostly
resistance training  
Diet: Mixed, working with RD, dairy free diet.  
  
OB History  
 T0  L0  
SAB0 IAB0 Ectopic0 Multiple0 Live Births0  
  
Gyn History  
  
LMP: 2021, Having periods  
Age at Menarche:  
Age at First Pregnancy:  
Age at Menopause:  
Gyn History Comments:  
Sexual Activity: Yes; Male  
Contraception: Vasectomy  
  
  
  
PAST MEDICAL HISTORY  
Diagnosis Date  
Lactose intolerance  
Raynaud disease  
  
PAST SURGICAL HISTORY  
Procedure Laterality Date  
ANKLE ARTHROSCOPY Right 2018  
COLONOSCOPY W/BIOPSY SINGLE/MULTIPLE 13  
EGD TRANSORAL BIOPSY SINGLE/MULTIPLE 13  
KNEE ARTHROSCOPY Left 2017  
LAP MARVA W EXPLORATION OF CD 2012  
PAST SURGICAL HISTORY OF  
ganglion cyst on wrist x3  
REPAIR UMBILICAL HERNIA 1982  
SKIN BX, 1 LESION 14  
Punch bx right gluteal fold skin lesion  
  
FAMILY HISTORY  
Problem Relation Age of Onset  
Diabetes Father  
other (myasthenia gravis) Father  
SOCIAL HISTORY  
Social History  
  
Tobacco Use  
Smoking status: Never  
Smokeless tobacco: Never  
Vaping Use  
Vaping Use: Never used  
Substance Use Topics  
Alcohol use: Yes  
Comment: 3x monthly  
  
REVIEW OF SYSTEMS  
Abdomen: Admits to occasional diarrhea. No abdominal pain, nausea, vomiting, or 
constipation. No bloating, early satiety, indigestion, or increased flatulence.  
Bladder: Admits to stress incontinence. Not causing significant distress. No pad
use. No dysuria, gross hematuria, burning with urination, urinary frequency, 
urinary urgency.  
Breast: No breast lumps, nipple d/c, overlying skin changes, redness or skin 
retraction.  
Allergies and current medication updated:No  
  
EXAM: LMP 2021  
  
  
GENERAL: pleasant,  female in no apparent distress  
HEENT: Normocephalic, atraumatic, mucus membranes moist, and no lesions  
NECK: Supple, full range of motion, no adenopathy, and thyroid normal  
DERMATOLOGY: Normal, without lesions, non-icteric, and non-hirsute  
BREAST: soft, non-tender, symmetric, no dominant mass, normal nipple-areolar 
complex, no lymphadenopathy, and no nipple discharge  
ABDOMEN: soft, non-tender, and no masses  
PELVIC: external genitalia normal, normal Bartholin's glands, urethra, Elizabeth's 
glands, no vulvar lesions, no cervical lesions, cervical prolpase grade 1, 
physiologic discharge present, normal appearing perineal body and perianal 
region  
BIMANUAL: uterus normal size, shape and consistency, no adnexal masses, non-
tender, and ?? Fibroid posteriorly  
RECTOVAGINAL: deferred.  
NEURO: alert and oriented x3,exam grossly non-focal  
EXTREMITIES: normal  
  
ASSESSMENT/PLAN:  
1) Health maintenance:  
Pap done with HPV.  
Mammogram ordered.  
Nutrition, exercise and routine health maintenance exams reviewed.  
Calcium/Vitamin D supplementation information provided.  
Lipids/glucose: ordered  
Vitamin D: ordered  
2) Contraception: vasectomy. Contraceptive options reviewed and information 
provided.  
3) STD screening: Declined STD check.  
4) Follow up one year or sooner as needed  
5) pelvic us for AUB  
  
Shani Walsh MD  
  
Electronically signed by Shani Sanchez MD at 10/28/2022 9:16 AM EDT  
  
* Shweta Paz Ma - 10/28/2022 7:59 AM EDTFormatting of this note might be 
  different from the original.  
Chaperone offered: Patient declines.  
  
Electronically signed by Shweat Paz Ma at 10/28/2022 9:16 AM EDT  
  
documented in this encounterSelect Medical Cleveland Clinic Rehabilitation [facility], [facility]06- NotePROCEDURE: XR FOOT RT
MIN 3 VIEWS, XR ANKLE RT MIN 3 VIEWS  
COMPARISON: None.  
HISTORY: Pain in right foot  
FINDINGS:  
BONES:No acute fracture or dislocation. No focal lytic or sclerotic changes.  
Mild enthesopathic spurring of the calcaneus at the Achilles and plantar  
insertions.  
SOFT TISSUES:Negative. No visible soft tissue swelling.  
EFFUSION:None visible.  
OTHER: Negative.  
IMPRESSION:  
No acute bony abnormality of the foot or ankle  
Enthesopathic spurring of the calcaneus  
Electronically authenticated by: SARWAT CARRILLO Date: 2022 17:07[facility]06- NotePROCEDURE: XR FOOT RT MIN 3 VIEWS, XR ANKLE RT MIN 3 
VIEWS  
COMPARISON: None.  
HISTORY: Pain in right foot  
FINDINGS:  
BONES:No acute fracture or dislocation. No focal lytic or sclerotic changes.  
Mild enthesopathic spurring of the calcaneus at the Achilles and plantar  
insertions.  
SOFT TISSUES:Negative. No visible soft tissue swelling.  
EFFUSION:None visible.  
OTHER: Negative.  
IMPRESSION:  
No acute bony abnormality of the foot or ankle  
Enthesopathic spurring of the calcaneus  
Electronically authenticated by: SARWAT CARRILLO Date: 2022 17:07[facility] noteNo assessment information availableWACMC Healthcare System Glen[facility]  
Work Phone: [phone] note*   
  
                                                    Diagnosis  
   
                                                      
  
  
Encounter for gynecological examination (general) (routine) without abnormal   
findings- Primary  
   
                                                      
  
  
Encounter for screening mammogram for malignant neoplasm of breast  
  
  
Other screening mammogram  
   
                                                      
  
  
Routine medical exam  
  
  
Routine general medical examination at a health care facility  
   
                                                      
  
  
Screening for thyroid disorder  
   
                                                      
  
  
Screening for deficiency anemia  
  
  
Screening for other and unspecified deficiency anemia  
   
                                                      
  
  
Screening cholesterol level  
  
  
Screening for lipoid disorders  
   
                                                      
  
  
Encounter for vitamin deficiency screening  
  
  
Screening for other and unspecified endocrine, nutritional, metabolic, and 
immunity   
disorders  
   
                                                      
  
  
Encounter for screening for diabetes mellitus  
  
  
Screening for diabetes mellitus  
   
                                                      
  
  
Abnormal uterine bleeding (AUB)  
   
                                                      
  
  
Screening for cervical cancer  
  
  
Screening for malignant neoplasm of the cervix  
   
                                                      
  
  
Encounter for screening for human papillomavirus (HPV)  
  
  
Special screening examination for human papillomavirus (HPV)  
   
                                                      
  
  
Screen for colon cancer  
  
  
Special screening for malignant neoplasms, colon  
   
                                                      
  
  
Special screening for malignant neoplasms, colon  
  
documented in this encounter  
[facility]   
  
                                                    Diagnosis  
   
                                                      
  
  
DUB (dysfunctional uterine bleeding)- Primary  
  
  
Other disorder of menstruation and other abnormal bleeding from female genital 
tract  
  
documented in this encounter  
[facility]*   
  
                          Diagnosis    Onset Date   Resolution   Status  
   
                          Pilonidal abscess of  cleft                        
     [facility]  
Work Phone: [phone] note*   
  
                          Diagnosis    Onset Date   Resolution   Status  
   
                          Pilonidal abscess of rubi cleft                        
     acute  
   
                          Pilonidal abscess of  cleft                        
     [facility]  
Work Phone: [phone] note*   
  
                          Diagnosis    Onset Date   Resolution   Status  
   
                          Pilonidal abscess of  cleft                        
     acute  
   
                          Pilonidal abscess of  cleft                        
     acute  
   
                          Encounter for screening for malignant neoplasm of colo  
n                           [facility]  
Work Phone: [phone] note*   
  
                                                    Diagnosis  
   
                                                      
  
  
Abnormal uterine bleeding (AUB)  
  
documented in this encounter  
[facility] note*   
  
                          Diagnosis    Onset Date   Resolution   Status  
   
                          Pilonidal abscess of  cleft                        
     acute  
   
                          Pilonidal abscess of  cleft                        
     acute  
   
                          Encounter for screening for malignant neoplasm of colo  
n                           acute  
   
                          Pilonidal abscess of  cleft                        
     [facility]  
Work Phone: [phone] note*   
  
                          Diagnosis    Onset Date   Resolution   Status  
   
                          Pilonidal abscess of  cleft                        
     acute  
   
                          Pilonidal abscess of  cleft                        
     acute  
   
                          Encounter for screening for malignant neoplasm of colo  
n                           acute  
   
                          Pilonidal abscess of rubi cleft                        
     acute  
   
                          Pilonidal abscess of rubi cleft                        
     acute  
   
                          Pilonidal abscess of  cleft                        
     acute  
   
                          Pilonidal abscess of rubi cleft                        
     [facility]  
Work Phone: [phone] note*   
  
                          Diagnosis    Onset Date   Resolution   Status  
   
                          Pilonidal abscess of rubi cleft                        
     acute  
   
                          Pilonidal abscess of rubi cleft                        
     acute  
   
                          Encounter for screening for malignant neoplasm of colo  
n                           acute  
   
                          Pilonidal abscess of rubi cleft                        
     acute  
   
                          Pilonidal abscess of  cleft                        
     acute  
   
                          Pilonidal abscess of rubi cleft                        
     acute  
   
                          Pilonidal abscess of rubi cleft                        
     acute  
   
                          Pilonidal abscess of  cleft                        
     acute  
   
                          Pilonidal abscess of  cleft                        
     acute  
   
                          Pilonidal abscess of rubi cleft                        
     [facility]  
Work Phone: [phone] note*   
  
                                                    Diagnosis  
   
                                                      
  
  
Encounter for gynecological examination (general) (routine) without abnormal   
findings- Primary  
   
                                                      
  
  
Encounter for screening mammogram for breast cancer  
  
documented in this encounter  
[facility]   
  
                          Diagnosis    Onset Date   Resolution   Status  
   
                                                    Inguinal hernia of right josafat  
e without obstruction or   
gangrene                                                    acute  
   
                          Left groin pain                           acute  
   
                          Pilonidal abscess of  cleft                        
     [facility]  
Work Phone: [phone] note*   
  
                                                    Diagnosis  
   
                                                      
  
  
Encounter for screening mammogram for breast cancer  
  
documented in this encounter  
[facility]   
  
                                                    Diagnosis  
   
                                                      
  
  
Encounter for screening for nutritional disorder  
  
documented in this encounter  
[facility]  
Work Phone: [phone] for referral (narrative)* Outpatient Procedure 
  (Routine) - Pending Review  
  
                          Specialty    Diagnoses / Procedures Referred By Contac  
t Referred To Contact  
   
                                                    DIG[facility]IVE DISEASE   
INSTITUTE                                 
  
  
Diagnoses  
  
  
Screen for colon cancer  
  
  
  
Procedures  
  
  
COLONOSCOPY SCREENING  
  
  
COLONOSCOPY FLX DX   
W/COLLJ SPEC WHEN PFRMD                   
  
  
Shani House MD  
  
  
[address]  
  
  
[place] [address]  
  
  
Phone: [phone]  
  
  
Fax: [phone]                         
  
  
Dig[facility]ive Disease   
[place]  
  
  
[address]  
  
  
[place] [address]  
  
  
  
                          Referral ID  Status       Reason       Start   
Date                                    Expiration   
Date                                    Visits   
Requested                               Visits   
Authorized  
   
                                        [id number]            Pending   
Review                                    
  
  
Auto-Generat  
ed Referral                             10/28/202  
2                   10/28/2023          1                   1  
  
  
  
  
Electronically signed by Shani Sanchez MD at 10/28/2022 9:15 AM EDT  
  
  
* Diagnostic Procedure Only (Routine) - Pending Review  
  
                          Specialty    Diagnoses / Procedures Referred By Contac  
t Referred To Contact  
   
                                                    [facility]                                 
  
  
Diagnoses  
  
  
Abnormal uterine   
bleeding (AUB)  
  
  
  
Procedures  
  
  
PELVIC US WHI  
  
  
US PELVIC NONOBSTETRIC   
REAL-TIME IMAGE COMPLETE                  
  
  
Shani House  [address]  
  
  
[place] [address]  
  
  
Phone: [phone]  
  
  
Fax: [phone]                         
  
  
[facility]  
  
  
[address] [place] [address]  
  
  
  
                          Referral ID  Status       Reason       Start   
Date                                    Expiration   
Date                                    Visits   
Requested                               Visits   
Authorized  
   
                                        [id number]            Pending   
Review                                    
  
  
Auto-Generat  
ed Referral                             10/28/202  
2                   10/28/2023          1                   1  
  
  
  
  
Electronically signed by Shani Sanchez MD at 10/28/2022 8:43 AM EDT  
  
  
* Diagnostic Procedure Only (Routine) - Pending Review  
  
                          Specialty    Diagnoses / Procedures Referred By Contac  
t Referred To Contact  
   
                                        [facility]            
  
  
Diagnoses  
  
  
Encounter for screening   
mammogram for malignant   
neoplasm of breast  
  
  
  
Procedures  
  
  
EMILY SCREENING W CLAUDETTE  
  
  
SCREENING DIGITAL BREAST   
TOMOSYNTHESIS BI  
  
  
SCREENING MAMMOGRAPHY BI   
2-VIEW BREAST INC CAD                     
  
  
Shani House MD  
  
  
[address]  
  
  
[place] [address]  
  
  
Phone: [phone]  
  
  
Fax: [phone]                         
  
  
[facility]  
  
  
[address] [place]   
[address]  
  
  
  
                          Referral ID  Status       Reason       Start   
Date                                    Expiration   
Date                                    Visits   
Requested                               Visits   
Authorized  
   
                                        [id number]            Pending   
Review                                    
  
  
Auto-Generat  
ed Referral                             10/28/202  
2                   2023          1                   1  
  
  
  
  
Electronically signed by Shani Sanchez MD at 10/28/2022 8:07 AM EDT  
  
  
[facility] Diagnostic Procedure Only 
  (Routine) - Authorized  
  
                          Specialty    Diagnoses / Procedures Referred By Contac  
t Referred To Contact  
   
                                        [facility]            
  
  
Diagnoses  
  
  
Encounter for screening   
mammogram for breast cancer  
  
  
  
Procedures  
  
  
EMIYL SCREENING W CLAUDETTE  
  
  
SCREENING DIGITAL BREAST   
TOMOSYNTHESIS BI  
  
  
SCREENING MAMMOGRAPHY BI   
2-VIEW BREAST INC CAD                     
  
  
NeShani Larson MD  
  
  
[address]  
  
  
[place] [address]  
  
  
Phone: [phone]  
  
  
Fax: [phone]                         
  
  
[facility]  
  
  
[address] [place]   
[address]  
  
  
  
                          Referral ID  Status       Reason       Start   
Date                                    Expiration   
Date                                    Visits   
Requested                               Visits   
Authorized  
   
                                [id number]        Authorized        
  
  
Auto-Generat  
ed Referral     3/18/2024       2025       1               1  
  
  
  
  
Electronically signed by Shani Sanchez MD at 2024 9:04 AM EDT  
  
  
[facility] for visit Narrative* Diagnostic Procedure Only (Routine) 
  - Closed  
  
                          Specialty    Diagnoses / Procedures Referred By Contac  
t Referred To Contact  
   
                                                    [facility]                                 
  
  
Diagnoses  
  
  
Abnormal uterine   
bleeding (AUB)  
  
  
  
Procedures  
  
  
PELVIC US WHI  
  
  
US PELVIC NONOBSTETRIC   
REAL-TIME IMAGE COMPLETE                  
  
  
Shani House MD  
  
  
[address]  
  
  
[place] [address]  
  
  
Phone: [phone]  
  
  
Fax: [phone]                         
  
  
[facility]  
  
  
[address] [place] [address]  
  
  
  
                    Referral ID Status    Reason    Start Date Expiration Date V  
isits   
Requested                               Visits   
Authorized  
   
                                [id number]        Closed            
  
  
Auto-Generate  
d Referral      2022      1               1  
  
  
  
[facility] for visit Narrative* Diagnostic Procedure Only (Routine) 
  - Closed  
  
                          Specialty    Diagnoses / Procedures Referred By Contac  
t Referred To Contact  
   
                                                    Radiology / RADIO CT   
SCAN [facility]                             
  
  
Diagnoses  
  
  
*order in scan docs* pt   
says she will contact   
insurance for pre  
  
  
approval  
  
  
CT ABD/PEL W IVCON   
(possible hernia)  
  
  
K40.90 - Unilateral   
inguinal hernia,   
without obstruction or   
gangrene,  
  
  
not specified as   
recurrent  
  
  
Faye Valero MD  
  
  
  
Procedures  
  
  
CT ORAL PREP                              
  
  
Faye Valero MD  
  
  
[address]  
  
  
[place] [address]  
  
  
Phone: [phone]  
  
  
Fax: [phone]                         
  
  
Radio Ct Scan [facility]  
  
  
[address]  
  
  
[place] [address]  
  
  
Phone: [phone]  
  
  
Fax: [phone]  
  
  
  
                Referral ID Status  Reason  Start Date Expiration Date Visits Re  
quested Visits   
Authorized  
   
                [id number] Closed          2024 3/21/2024 2       2  
  
  
  
[facility] (Routine) 
  - Closed  
  
                          Specialty    Diagnoses / Procedures Referred By Contac  
t Referred To Contact  
   
                                        [facility]            
  
  
Diagnoses  
  
  
Encounter for screening   
mammogram for breast cancer  
  
  
  
Procedures  
  
  
EMILY SCREENING W CLAUDETTE  
  
  
SCREENING DIGITAL BREAST   
TOMOSYNTHESIS BI  
  
  
SCREENING MAMMOGRAPHY BI   
2-VIEW BREAST INC CAD                     
  
  
Shani House MD  
  
  
[address]  
  
  
[place] [address]  
  
  
Phone: [phone]  
  
  
Fax: [phone]                         
  
  
[facility]  
  
  
[address] [place]   
[address]  
  
  
  
                    Referral ID Status    Reason    Start Date Expiration Date V  
isits   
Requested                               Visits   
Authorized  
   
                                [id number]        Closed            
  
  
Auto-Generate  
d Referral      3/18/2024       2025       1               1  
  
  
  
[facility]  
  
Summary Purpose  
  
  
                                                      
  
  
  
Family History  
No Family History Records Found  
  
                          Relationship Condition    Age at Onset Recorded Date/T  
mabel  
   
                          father       Polyp of colon Unknown        
   
                                       Diabetes mellitus Unknown        
   
                                       Abdominal aortic aneurysm (AAA) Unknown    
      
   
                                       Hypertension Unknown        
   
                          mother       Polyp of colon Unknown        
   
                                       Neuropathy   Unknown        
  
  
  
Advance Directives  
No Advanced Directives Records Found  
  
                                Advance Directive Response        Recorded Date/  
Time  
   
                                Living Will     No              May 5th, 2017 11  
:08am  
   
                                Power of  No              May 5th, 2017   
11:08am  
  
  
  
                                Advance Directive Response        Recorded Date/  
Time  
   
                                Living Will     No              May 5th, 2017 10  
:08am  
   
                                Power of  No              May 5th, 2017   
10:08am  
  
  
  
                                Advance Directive Response        Recorded Date/  
Time  
   
                                Living Will     No              , 2  
022 2:41pm  
   
                                Power of  No              2022 2:41pm  
  
  
  
                                Advance Directive Response        Recorded Date/  
Time  
   
                                Living Will     No              , 2  
022 3:41pm  
   
                                Power of  No              2022 3:41pm  
  
  
  
                                Advance Directive Response        Recorded Date/  
Time  
   
                                Living Will     No              May 3rd, 2024 12  
:35pm  
   
                                Power of  No              May 3rd, 2024   
12:35pm  
  
  
  
Chief Complaint and Reason for Visit  
  
  
                                        Chief Complaint     HAND FOOT MOUTH  
  
  
  
                                        Chief Complaint     Amb Documentation  
abscess  
Amb Documentation  
   
                                        Reason for Visit    Pilonidal abscess of  
  cleft  
  
  
  
                                        Chief Complaint     Amb Documentation  
abscess  
Amb Documentation  
recheck pilonidal  
   
                                        Reason for Visit    Pilonidal abscess of  
  cleft  
Pilonidal abscess of rubi cleft  
  
  
  
                                        Chief Complaint     Amb Documentation  
abscess  
Amb Documentation  
recheck pilonidal  
   
                                        Reason for Visit    Pilonidal abscess of  
  cleft  
Pilonidal abscess of  cleft  
Encounter for screening for malignant neoplasm of colon  
  
  
  
                                        Chief Complaint     Amb Documentation  
abscess  
Amb Documentation  
recheck pilonidal  
PILONIDAL CYST  
   
                                        Reason for Visit    Pilonidal abscess of  
 rubi cleft  
Pilonidal abscess of  cleft  
Encounter for screening for malignant neoplasm of colon  
Pilonidal abscess of  cleft  
  
  
  
                                        Chief Complaint     Amb Documentation  
abscess  
Amb Documentation  
recheck pilonidal  
PILONIDAL CYST  
F/U Incsion and drainage pilonidal abscess   
F/U Incsion and drainage pilonidal abscess   
wound check  
WOUND CHECK  
   
                                        Reason for Visit    Pilonidal abscess of  
 rubi cleft  
Pilonidal abscess of  cleft  
Encounter for screening for malignant neoplasm of colon  
Pilonidal abscess of rubi cleft  
Pilonidal abscess of  cleft  
Pilonidal abscess of  cleft  
Pilonidal abscess of rubi cleft  
  
  
  
                                        Chief Complaint     abscess  
Amb Documentation  
recheck pilonidal  
PILONIDAL CYST  
F/U Incsion and drainage pilonidal abscess   
F/U Incsion and drainage pilonidal abscess   
wound check  
Check pilonidal  
   
                                        Reason for Visit    Pilonidal abscess of  
  cleft  
Pilonidal abscess of rubi cleft  
Encounter for screening for malignant neoplasm of colon  
Pilonidal abscess of rubi cleft  
Pilonidal abscess of rubi cleft  
Pilonidal abscess of  cleft  
Pilonidal abscess of rubi cleft  
Pilonidal abscess of rubi cleft  
  
  
  
                                        Chief Complaint     Possible hernia  
  
  
  
                                        Chief Complaint     Possible hernia  
I&D of pilonidal cyst  
   
                                        Reason for Visit    Inguinal hernia of r  
ight side without obstruction or gangrene  
Left groin pain  
Pilonidal abscess of  cleft  
  
  
  
                                        Chief Complaint     Possible hernia  
I&D of pilonidal cyst  
UPDATE H&P  
   
                                        Reason for Visit    Inguinal hernia of r  
ight side without obstruction or gangrene  
Left groin pain  
Pilonidal abscess of rubi cleft  
  
  
  
Reason for Referral  
  
  
                          Specialty    Diagnoses / Procedures Referred By Contac  
t Referred To Contact  
   
                                        Radiology             
  
  
Diagnoses  
  
  
Encounter for screening for   
nutritional disorder  
  
  
  
Procedures  
  
  
CT cardiac scoring wo IV   
contrast                                  
  
  
Ranney, Christopher Behr,  [address]  
  
  
[address]  
  
  
[place] [address]  
  
  
Phone: [phone]  
  
  
Fax: [phone]                         
  
  
  
  
  
                          Referral ID  Status       Reason       Start   
Date                                    Expiration   
Date                                    Visits   
Requested                               Visits   
Authorized  
   
                                        [id number]             Pending   
Review                                    
  
  
Perform   
Procedure       2024       1               1  
  
  
  
Additional Source Comments  
  
  
  
                                                    INFORMATION SOURCE (unrecogn  
ized section and content)  
   
                                          
  
  
  
                                        DATE CREATED        AUTHOR  
   
                                2020                      [facility]  
  
  
  
                                DATE CREATED    AUTHOR          AUTHOR'S ORGANIZ  
ATION  
   
                                2022                      [facility]  
  
  
  
                                DATE CREATED    AUTHOR          AUTHOR'S ORGANIZ  
ATION  
   
                                2023                      [facility]  
  
  
  
                                DATE CREATED    AUTHOR          AUTHOR'S ORGANIZ  
ATION  
   
                                2024                      [facility]  
  
  
  
                                DATE CREATED    AUTHOR          AUTHOR'S ORGANIZ  
ATION  
   
                                2024                      [facility]  
  
  
  
                                DATE CREATED    AUTHOR          AUTHOR'S ORGANIZ  
ATION  
   
                                2024                      [facility]  
  
  
  
  
  
                                                    Goals (unrecognized section   
and content)  
   
                                                    Goals may be documented in a  
n alternate sectionGoals may be documented in an   
alternate sectionGoals may be documented in an alternate sectionGoals may be   
documented in an alternate sectionGoals may be documented in an alternate   
sectionGoals may be documented in an alternate section  
  
  
  
  
                                                    Source Comments (unrecognize  
d section and content)  
   
                                                    In the event this informatio  
n is protected by the Federal Confidentiality of   
Alcohol   
and Drug Abuse Patient Records regulations: This information has been disclosed 
to   
you from records protected by Federal confidentiality rules (42 CFR Part 2). The
  
Federal rules prohibit you from making any further disclosure of this 
information   
unless further disclosure is expressly permitted by the written consent of the 
person   
to whom it pertains or as otherwise permitted by 42 CFR Part 2. A general   
authorization for the release of medical or other information is NOT sufficient 
for   
this purpose. The Federal rules restrict any use of the information to 
criminally   
investigate or prosecute any alcohol or drug abuse patient.[facility] 
the   
event this information is protected by the Federal Confidentiality of Alcohol 
and   
Drug Abuse Patient Records regulations: This information has been disclosed to 
you   
from records protected by Federal confidentiality rules (42 CFR Part 2). The 
Federal   
rules prohibit you from making any further disclosure of this information unless
  
further disclosure is expressly permitted by the written consent of the person 
to   
whom it pertains or as otherwise permitted by 42 CFR Part 2. A general 
authorization   
for the release of medical or other information is NOT sufficient for this 
purpose.   
The Federal rules restrict any use of the information to criminally investigate 
or   
prosecute any alcohol or drug abuse patient.[facility] the event this   
information is protected by the Federal Confidentiality of Alcohol and Drug 
Abuse   
Patient Records regulations: This information has been disclosed to you from 
records   
protected by Federal confidentiality rules (42 CFR Part 2). The Federal rules   
prohibit you from making any further disclosure of this information unless 
further   
disclosure is expressly permitted by the written consent of the person to whom 
it   
pertains or as otherwise permitted by 42 CFR Part 2. A general authorization for
the   
release of medical or other information is NOT sufficient for this purpose. The   
Federal rules restrict any use of the information to criminally investigate or   
prosecute any alcohol or drug abuse patient.[facility] the event this   
information is protected by the Federal Confidentiality of Alcohol and Drug 
Abuse   
Patient Records regulations: This information has been disclosed to you from 
records   
protected by Federal confidentiality rules (42 CFR Part 2). The Federal rules   
prohibit you from making any further disclosure of this information unless 
further   
disclosure is expressly permitted by the written consent of the person to whom 
it   
pertains or as otherwise permitted by 42 CFR Part 2. A general authorization for
the   
release of medical or other information is NOT sufficient for this purpose. The   
Federal rules restrict any use of the information to criminally investigate or   
prosecute any alcohol or drug abuse patient.[facility] the event this   
information is protected by the Federal Confidentiality of Alcohol and Drug 
Abuse   
Patient Records regulations: This information has been disclosed to you from 
records   
protected by Federal confidentiality rules (42 CFR Part 2). The Federal rules   
prohibit you from making any further disclosure of this information unless 
further   
disclosure is expressly permitted by the written consent of the person to whom 
it   
pertains or as otherwise permitted by 42 CFR Part 2. A general authorization for
the   
release of medical or other information is NOT sufficient for this purpose. The   
Federal rules restrict any use of the information to criminally investigate or   
prosecute any alcohol or drug abuse patient.[facility] Shaw[facility] the event this   
information is protected by the Federal Confidentiality of Alcohol and Drug 
Abuse   
Patient Records regulations: This information has been disclosed to you from 
records   
protected by Federal confidentiality rules (42 CFR Part 2). The Federal rules   
prohibit you from making any further disclosure of this information unless 
further   
disclosure is expressly permitted by the written consent of the person to whom 
it   
pertains or as otherwise permitted by 42 CFR Part 2. A general authorization for
the   
release of medical or other information is NOT sufficient for this purpose. The   
Federal rules restrict any use of the information to criminally investigate or   
prosecute any alcohol or drug abuse patient.[facility] ShawIn the event this   
information is protected by the Federal Confidentiality of Alcohol and Drug 
Abuse   
Patient Records regulations: This information has been disclosed to you from 
records   
protected by Federal confidentiality rules (42 CFR Part 2). The Federal rules   
prohibit you from making any further disclosure of this information unless 
further   
disclosure is expressly permitted by the written consent of the person to whom 
it   
pertains or as otherwise permitted by 42 CFR Part 2. A general authorization for
the   
release of medical or other information is NOT sufficient for this purpose. The   
Federal rules restrict any use of the information to criminally investigate or   
prosecute any alcohol or drug abuse patient.[facility] ShawIn the event this   
information is protected by the Federal Confidentiality of Alcohol and Drug 
Abuse   
Patient Records regulations: This information has been disclosed to you from 
records   
protected by Federal confidentiality rules (42 CFR Part 2). The Federal rules   
prohibit you from making any further disclosure of this information unless 
further   
disclosure is expressly permitted by the written consent of the person to whom 
it   
pertains or as otherwise permitted by 42 CFR Part 2. A general authorization for
the   
release of medical or other information is NOT sufficient for this purpose. The   
Federal rules restrict any use of the information to criminally investigate or   
prosecute any alcohol or drug abuse patient.[facility] ShawIn the event this   
information is protected by the Federal Confidentiality of Alcohol and Drug 
Abuse   
Patient Records regulations: This information has been disclosed to you from 
records   
protected by Federal confidentiality rules (42 CFR Part 2). The Federal rules   
prohibit you from making any further disclosure of this information unless 
further   
disclosure is expressly permitted by the written consent of the person to whom 
it   
pertains or as otherwise permitted by 42 CFR Part 2. A general authorization for
the   
release of medical or other information is NOT sufficient for this purpose. The   
Federal rules restrict any use of the information to criminally investigate or   
prosecute any alcohol or drug abuse patient.[facility] ShawIn the event this   
information is protected by the Federal Confidentiality of Alcohol and Drug 
Abuse   
Patient Records regulations: This information has been disclosed to you from 
records   
protected by Federal confidentiality rules (42 CFR Part 2). The Federal rules   
prohibit you from making any further disclosure of this information unless 
further   
disclosure is expressly permitted by the written consent of the person to whom 
it   
pertains or as otherwise permitted by 42 CFR Part 2. A general authorization for
the   
release of medical or other information is NOT sufficient for this purpose. The   
Federal rules restrict any use of the information to criminally investigate or   
prosecute any alcohol or drug abuse patient.[facility] ShawIn the event this   
information is protected by the Federal Confidentiality of Alcohol and Drug 
Abuse   
Patient Records regulations: This information has been disclosed to you from 
records   
protected by Federal confidentiality rules (42 CFR Part 2). The Federal rules   
prohibit you from making any further disclosure of this information unless 
further   
disclosure is expressly permitted by the written consent of the person to whom 
it   
pertains or as otherwise permitted by 42 CFR Part 2. A general authorization for
the   
release of medical or other information is NOT sufficient for this purpose. The   
Federal rules restrict any use of the information to criminally investigate or   
prosecute any alcohol or drug abuse patient.[facility] ShawIn the event this   
information is protected by the Federal Confidentiality of Alcohol and Drug 
Abuse   
Patient Records regulations: This information has been disclosed to you from 
records   
protected by Federal confidentiality rules (42 CFR Part 2). The Federal rules   
prohibit you from making any further disclosure of this information unless 
further   
disclosure is expressly permitted by the written consent of the person to whom 
it   
pertains or as otherwise permitted by 42 CFR Part 2. A general authorization for
the   
release of medical or other information is NOT sufficient for this purpose. The   
Federal rules restrict any use of the information to criminally investigate or   
prosecute any alcohol or drug abuse patient.[facility] ShawIn the event this   
information is protected by the Federal Confidentiality of Alcohol and Drug 
Abuse   
Patient Records regulations: This information has been disclosed to you from 
records   
protected by Federal confidentiality rules (42 CFR Part 2). The Federal rules   
prohibit you from making any further disclosure of this information unless 
further   
disclosure is expressly permitted by the written consent of the person to whom 
it   
pertains or as otherwise permitted by 42 CFR Part 2. A general authorization for
the   
release of medical or other information is NOT sufficient for this purpose. The   
Federal rules restrict any use of the information to criminally investigate or   
prosecute any alcohol or drug abuse patient.[facility]  
  
  
  
  
                                                    Reason for Visit (unrecogniz  
ed section and content)  
   
                                          
  
  
  
                                        Reason              Comments  
   
                                        Yearly Exam           
  
  
  
                                        Reason              Comments  
   
                                        DUB                   
  
  
  
                          Specialty    Diagnoses / Procedures Referred By Contac  
t Referred To Contact  
   
                                        OB/GYN                
  
  
Diagnoses  
  
  
Encounter for follow-up  
  
  
READ  
  
  
  
Procedures  
  
  
OFFICE/OUTPATIENT ESTABLISHED   
HIGH MDM 40-54 MIN  
  
  
EST WHI PATIENT                           
  
  
Self                                      
  
  
Karely Comer MD  
  
  
[address] [place] [address]  
  
  
Phone: [phone]  
  
  
Fax: [phone]  
  
  
  
                Referral ID Status  Reason  Start Date Expiration Date Visits Re  
quested Visits   
Authorized  
   
                [id number] Closed          2022 1       1  
  
  
  
                                        Reason              Comments  
   
                                        Results               
   
                                        Orders                
  
  
  
                                        Reason              Comments  
   
                                        Radiology CT          
  
  
  
                          Specialty    Diagnoses / Procedures Referred By Contac  
t Referred To Contact  
   
                                                    Radiology / RADIO CT   
[facility]AN [facility]                             
  
  
Diagnoses  
  
  
*order in scan docs* pt   
says she will contact   
insurance for pre  
  
  
approval  
  
  
CT ABD/PEL W IVCON   
(possible hernia)  
  
  
K40.90 - Unilateral   
inguinal hernia,   
without obstruction or   
gangrene,  
  
  
not specified as   
recurrent  
  
  
Faye Valero MD  
  
  
  
Procedures  
  
  
CT ORAL PREP                              
  
  
Faye Valero MD  
  
  
[address]  
  
  
[place] [address]  
  
  
Phone: [phone]  
  
  
Fax: [phone]                         
  
  
Radio Ct Scan [facility]   
Wstr  
  
  
[address]  
  
  
[place] [address]  
  
  
Phone: [phone]  
  
  
Fax: [phone]  
  
  
  
                Referral ID Status  Reason  Start Date Expiration Date Visits Re  
quested Visits   
Authorized  
   
                [id number] Closed          2024 3/21/2024 2       2  
  
  
  
                                        Reason              Comments  
   
                                        Yearly Exam           
  
  
  
                          Specialty    Diagnoses / Procedures Referred By Contac  
t Referred To Contact  
   
                                        Radiology             
  
  
Diagnoses  
  
  
Encounter for screening for   
nutritional disorder  
  
  
  
Procedures  
  
  
CT cardiac scoring wo IV   
contrast                                  
  
  
Ranney, Christopher Behr, MD  
  
  
[address]   
  
  
[address]  
  
  
[place] [address]  
  
  
Phone: [phone]  
  
  
Fax: [phone]                         
  
  
  
  
  
                          Referral ID  Status       Reason       Start   
Date                                    Expiration   
Date                                    Visits   
Requested                               Visits   
Authorized  
   
                                        [id number]             Pending   
Review                                    
  
  
Perform   
Procedure       2024       1               1  
  
  
  
  
  
                                                    Care Teams (unrecognized sec  
tion and content)  
   
                                          
  
  
  
                      Team Member Relationship Specialty  Start Date End Date  
   
                                                      
  
  
Jose Sloan MD  
  
  
[address] [facility] [address] [place], [place] [id number] [phone] (Work)  
  
  
[phone] (Fax) PCP - General                   2/8/10            
  
  
  
                      Team Member Relationship Specialty  Start Date End Date  
   
                                                      
  
  
Jose Sloan MD  
  
  
[address]  
  
  
[place], [place] [id number] [phone] (Work)  
  
  
[phone] (Fax) PCP - General                   2/8/10            
  
  
  
                      Team Member Relationship Specialty  Start Date End Date  
   
                                                      
  
  
Jose Sloan MD  
  
  
[address]  
  
  
[place] [address] [phone] (Work)  
  
  
[phone] (Fax) PCP - General                   2/8/10            
  
  
  
                      Team Member Relationship Specialty  Start Date End Date  
   
                                                      
  
  
Jose Sloan MD  
  
  
[address] [place] [id number] [phone] (Work)  
  
  
[phone] (Fax) PCP - General                   2/8/10            
  
  
  
                                                    Team Status: Active   
   
                          Member       Role         Status       Dates  
   
                          Dr. Krystian Sloan MD Family Provider Active       
    
   
                          Dr. Krystian Sloan MD Primary Care Provider Activ  
e         
  
  
  
                                                    Team Status: Active   
   
                          Member       Role         Status       Dates  
   
                          Dr. Krystian Sloan MD Primary Care Provider Activ  
e         
   
                          Carolina Sunshine   Attending Provider Active         
  
  
  
                                                    Team Status: Inactive   
   
                          Member       Role         Status       Dates  
   
                          Dr. Krystian Sloan MD Primary Care Provider, Refe  
rring Provider Active         
   
                          Dr. Faye Valero MD Attending Provider Active       
    
  
  
  
                                                    Team Status: Active   
   
                          Member       Role         Status       Dates  
   
                          Dr. Krystian Sloan MD Primary Care Provider Activ  
e         
   
                          Yusra Maria Attending Provider Active         
  
  
  
                                                    Team Status: Active   
   
                          Member       Role         Status       Dates  
   
                          Dr. Krystian Sloan MD Primary Care Provider, Refe  
rring Provider Active         
   
                          Dr. Faye Valero MD Attending Provider, Other Prov  
ider Active         
  
  
  
                                                    Team Status: Inactive   
   
                          Member       Role         Status       Dates  
   
                          Dr. Krystian Sloan MD Primary Care Provider Activ  
e         
   
                          Dr. Faye Valero MD Attending Provider, Referring   
Provider Active         
  
  
  
                      Team Member Relationship Specialty  Start Date End Date  
   
                                                      
  
  
Jose Sloan MD  
  
  
[address] [place] [address]  
  
  
[phone] (Work)  
  
  
[phone] (Fax) PCP - General                   2/8/10            
  
  
  
                                                    Team Status: Inactive   
   
                          Member       Role         Status       Dates  
   
                          Dr. Krystian Sloan MD Primary Care Provider, Refe  
rring Provider Active         
   
                          BENJAMIN LindsayC Attending Provider Active      
     
  
  
  
                      Team Member Relationship Specialty  Start Date End Date  
   
                                                      
  
  
Jose Sloan MD  
  
  
[address] [place] [id number] [phone] (Work)  
  
  
[phone] (Fax) PCP - General                   2/8/10            
  
  
  
                                                    Team Status: Inactive   
   
                          Member       Role         Status       Dates  
   
                          Dr. Krystian Sloan MD Primary Care Provider Activ  
e         
   
                          Dr. Faye Valero MD Attending Provider Active       
    
   
                          Doreen URIBE PA-C Referring Provider Active      
     
  
  
  
                      Team Member Relationship Specialty  Start Date End Date  
   
                                                      
  
  
Jose Sloan MD  
  
  
NPI: [id number]  
  
  
[address]  
  
  
[place] [address]  
  
  
[phone] (Work)  
  
  
[phone] (Fax) PCP - General                   2/8/10            
  
  
  
                      Team Member Relationship Specialty  Start Date End Date  
   
                                                      
  
  
Jose Sloan MD  
  
  
NPI: [id number]  
  
  
[address]  
  
  
[place] [address]  
  
  
[phone] (Work)  
  
  
[phone] (Fax) PCP - General                   2/8/10            
  
  
  
                      Team Member Relationship Specialty  Start Date End Date  
   
                                                      
  
  
Jose Sloan MD  
  
  
NPI: [id number]  
  
  
[address]  
  
  
[place] [address]  
  
  
[phone] (Work)  
  
  
[phone] (Fax) PCP - General                   2/8/10            
  
  
  
                      Team Member Relationship Specialty  Start Date End Date  
   
                                                      
  
  
Jose Sloan MD  
  
  
NPI: [id number]  
  
  
[address]  
  
  
[place] [address]  
  
  
[phone] (Work)  
  
  
[phone] (Fax) PCP - General                   2/8/10            
  
  
  
                      Team Member Relationship Specialty  Start Date End Date  
   
                                                      
  
  
Jose Sloan MD  
  
  
NPI: [id number]  
  
  
[address]  
  
  
[place] [address]  
  
  
[phone] (Work)  
  
  
[phone] (Fax) PCP - General                   2/8/10            
  
  
  
                      Team Member Relationship Specialty  Start Date End Date  
   
                                                      
  
  
Jose Sloan MD  
  
  
NPI: [id number]  
  
  
[address]  
  
  
[place] [address]  
  
  
[phone] (Work)  
  
  
[phone] (Fax) PCP - General                   2/8/10            
  
  
  
                                                    Team Status: Active   
   
                          Member       Role         Status       Dates  
   
                          Dr. Jose Sloan MD Family Provider Active      
     
   
                          Dr. Jose Sloan MD Primary Care Provider Acti  
ve         
  
  
  
                                                    Team Status: Inactive   
   
                          Member       Role         Status       Dates  
   
                          Dr. Jose Sloan MD Primary Care Provider, Ref  
erring Provider Active         
   
                          Dr. Faye Valero MD Attending Provider Active       
    
  
  
  
                                                    Team Status: Inactive   
   
                          Member       Role         Status       Dates  
   
                          Dr. Jose Sloan MD Primary Care Provider, Ref  
erring Provider Active         
   
                          Doreen URIBE, PA-C Attending Provider Active      
     
  
  
  
                      Team Member Relationship Specialty  Start Date End Date  
   
                                                      
  
  
Ranney, Christopher Behr, MD  
  
  
NPI: [id number]  
  
  
[address]  
  
  
[address]  
  
  
[place] [address]  
  
  
[phone] (Work)  
  
  
[phone] (Fax) PCP - General   Family Medicine 8/10/24           
  
  
FOR RECORDS PERTAINING TO PATIENTS WHO ARE OR HAVE BEEN ENROLLED IN A CHEMICAL 
DEPENDENCY/SUBSTANCEABUSE PROGRAM, SOME INFORMATION MAY BE OMITTED. This 
clinical summary was aggregated from multiple sources. Caution should be 
exercised in using it in the provision of clinical care. This summary normalizes
information from multiple sources, and as a consequence, information in this 
document may materially change the coding, format and clinical context of 
patient data. In addition, data may be omitted in some cases. CLINICAL DECISIONS
SHOULD BE BASED ON THE PRIMARY CLINICAL RECORDS. [employer] provides 
no warranty or guarantee of the accuracy or completeness of information in this 
document.

## 2025-02-03 NOTE — P.GSHP_ITS
History of Present Illness    
History of Present Illness    
Chief complaint: PLANTAR FACIAL FIBROMATOSIS, L ACHILLES TENDONITIS    
Narrative:     
Presents for presurgical testing.  Please see HPI from Dr. Hammond dated   
January 21, 2025.    
    
Review of Systems    
    
    
ROS      
    
 Narrative REVIEW OF SYSTEMS: Negative except as stated in HPI, ten or more   
systems reviewed.     
    
Constitutional: No fever, chills, weakness     
    
ENT: No sore throat or epistaxis     
    
Cardiovascular: No edema, chest pain, palpitations, or activity intolerance     
    
Respiratory: No shortness of breath, cough, or wheezing     
    
Gastrointestinal: No abdominal pain, constipation, diarrhea, or vomiting     
    
Genitourinary: No dysuria or hematuria     
    
Neurological: No numbness, tingling, weakness, or headache     
    
Psychiatric: No mood changes       
    
    
[facility]    
[facility]    
Medical History (Updated 02/03/25 @ 13:34 by Kristine Lopez NP)    
    
Anxiety    
   ?F41.9 - Anxiety disorder, unspecified (ICD-10)    
Migraine    
   ?G43.909 - Migraine, unspecified, not intractable, without status migrainosus  
   (ICD-10)    
GERD (gastroesophageal reflux disease)    
   ?K21.9 - Gastro-esophageal reflux disease without esophagitis (ICD-10)    
Pain in left ankle and joints of left foot    
   ?M25.572 - Pain in left ankle and joints of left foot (ICD-10)    
Contracture, left ankle    
   ?M24.572 - Contracture, left ankle (ICD-10)    
Left ankle instability    
   ?M25.372 - Other instability, left ankle (ICD-10)    
Left Achilles tendinitis    
   ?M76.62 - Achilles tendinitis, left leg (ICD-10)    
Plantar fascial fibromatosis of left foot    
   ?M72.2 - Plantar fascial fibromatosis (ICD-10)    
    
    
Surgical History (Updated 02/03/25 @ 13:32 by Kristine Lopez NP)    
    
History of colonoscopy    
   ?Z98.890 - Other specified postprocedural states (ICD-10)    
History of cholecystectomy    
   ?Z90.49 - Acquired absence of other specified parts of digestive tract (ICD-  
   10)    
S/P arthroscopic knee surgery    
   ?Z98.890 - Other specified postprocedural states (ICD-10)    
H/O wrist surgery    
   ?Z98.890 - Other specified postprocedural states (ICD-10)    
History of ankle surgery    
   ?Z98.890 - Other specified postprocedural states (ICD-10)    
History of hernia repair    
   ?Z98.890 - Other specified postprocedural states (ICD-10)    
   ?Z87.19 - Personal history of other diseases of the digestive system (ICD-10)    
    
    
Family History (Updated 02/03/25 @ 13:32 by Kristine Lopez NP)    
Other   Family history of aneurysm    
   Family history of diabetes mellitus    
   Family history of hypertension    
    
    
    
Social History (Updated 02/03/25 @ 13:30 by Kristine Lopez NP)    
Within the past year, how often did you have a drink containing alcohol:  2-4   
times a month     
Smoking status:  Never smoker     
Non-prescribed substance use:  denies use     
Previous occupational history:  Nurse     
Highest level of school completed/degree received:  Master's degree     
    
    
    
Meds    
Home Medications and Allergies    
                                Home Medications    
    
    
    
?Medication ?Instructions ?Recorded ?Confirmed ?Type    
     
famotidine 40 mg tablet 40 mg PO DAILY PRN acid reflux 02/03/25 02/03/25 History    
    
    
    
                                    Allergies    
    
    
    
Allergy/AdvReac Type Severity Reaction Status Date / Time    
     
Milk Containing Products Allergy  Abdominal Verified 02/03/25 13:28    
    
(Dairy)   Pain      
    
    
    
    
Exam    
Narrative    
Exam Narrative:     
Constitutional: Awake, alert, comfortable, well-appearing, nontoxic,   
interactive, vital signs as charted     
    
Head: Normocephalic, atraumatic    
    
Eyes: Conjunctiva and lids normal to inspection     
    
ENT: Naris patent, posterior oropharynx clear, oral mucosa moist     
    
Neck: Supple, normal appearance, normal range of motion, no meningeal signs     
    
Respiratory: No respiratory distress, breath sounds clear     
    
Cardiovascular: Regular rate and rhythm, strong and regular heart tones     
    
Skin: No rashes or induration, no lesions, only visible skin inspected     
    
Neuro: No neurological deficits, normal sensation     
    
Psychiatric: Oriented ?3, normal affect    
    
Assessment and Plan    
Assessment and Plan    
(1) Plantar fascial fibromatosis of left foot:     
(2) Left Achilles tendinitis:     
(3) Left ankle instability:     
(4) Contracture, left ankle:     
(5) Pain in left ankle and joints of left foot:     
    
Plan    
Left ankle stress exam under intraoperative fluoroscopy with possible   
arthroscopy, lateral ankle stabilization and peroneal tendon repair versus   
debridement, left endoscopic plantar fasciotomy and Paige gastrocnemius   
recession scheduled with Dr. Hammond February 10, 2025.

## 2025-02-03 NOTE — ECG_ITS
The [facility] 
                                        
                                       Test Date:    2025 
Pat Name:     Bing Bolden           Department:    
Patient ID:   [id number]               Room:         - 
Gender:       Female                   Technician:    
:          1975               Requested By: ANH MCLEOD 
Order Number: [id number]              Reading MD:   CATERINA BREWER 
                                 Measurements 
Intervals                              Axis           
Rate:         68                       P:            1 
TX:           152                      QRS:          56 
QRSD:         78                       T:            58 
QT:           409                                     
QTc:          436                                     
                           Interpretive Statements 
SINUS RHYTHM 
No previous ECG available for comparison 
Electronically Signed On 2-3-2025 20:48:49 EST by CATERINA BREWER

## 2025-02-03 NOTE — PM.PRESUREVA
History of Present Illness
History of Present Illness
Chief complaint: PLANTAR FACIAL FIBROMATOSIS, L ACHILLES TENDONITIS
Narrative: 
Presents for presurgical testing.  Please see HPI from Dr. Hammond dated January 21, 2025.

Review of Systems
ROS  
 Narrative REVIEW OF SYSTEMS: Negative except as stated in HPI, ten or more systems reviewed. 

Constitutional: No fever, chills, weakness 

ENT: No sore throat or epistaxis 

Cardiovascular: No edema, chest pain, palpitations, or activity intolerance 

Respiratory: No shortness of breath, cough, or wheezing 

Gastrointestinal: No abdominal pain, constipation, diarrhea, or vomiting 

Genitourinary: No dysuria or hematuria 

Neurological: No numbness, tingling, weakness, or headache 

Psychiatric: No mood changes   

PFSH
[facility]
Medical History (Updated 02/03/25 @ 13:34 by Kristine Lopez NP)

Anxiety
 ?F41.9 - Anxiety disorder, unspecified (ICD-10)
Migraine
 ?G43.909 - Migraine, unspecified, not intractable, without status migrainosus (ICD-10)
GERD (gastroesophageal reflux disease)
 ?K21.9 - Gastro-esophageal reflux disease without esophagitis (ICD-10)
Pain in left ankle and joints of left foot
 ?M25.572 - Pain in left ankle and joints of left foot (ICD-10)
Contracture, left ankle
 ?M24.572 - Contracture, left ankle (ICD-10)
Left ankle instability
 ?M25.372 - Other instability, left ankle (ICD-10)
Left Achilles tendinitis
 ?M76.62 - Achilles tendinitis, left leg (ICD-10)
Plantar fascial fibromatosis of left foot
 ?M72.2 - Plantar fascial fibromatosis (ICD-10)


Surgical History (Updated 02/03/25 @ 13:32 by Kristine Lopez NP)

History of colonoscopy
 ?Z98.890 - Other specified postprocedural states (ICD-10)
History of cholecystectomy
 ?Z90.49 - Acquired absence of other specified parts of digestive tract (ICD-10)
S/P arthroscopic knee surgery
 ?Z98.890 - Other specified postprocedural states (ICD-10)
H/O wrist surgery
 ?Z98.890 - Other specified postprocedural states (ICD-10)
History of ankle surgery
 ?Z98.890 - Other specified postprocedural states (ICD-10)
History of hernia repair
 ?Z98.890 - Other specified postprocedural states (ICD-10)
 ?Z87.19 - Personal history of other diseases of the digestive system (ICD-10)


Family History (Updated 02/03/25 @ 13:32 by Kristine Lopez NP)
Other
 Family history of aneurysm
 Family history of diabetes mellitus
 Family history of hypertension



Social History (Updated 02/03/25 @ 13:30 by Kristine Lopez NP)
Within the past year, how often did you have a drink containing alcohol:  2-4 times a month 
Smoking status:  Never smoker 
Non-prescribed substance use:  denies use 
Previous occupational history:  Nurse 
Highest level of school completed/degree received:  Master's degree 



Meds
Home Medications and Allergies
Home Medications

?Medication ?Instructions ?Recorded ?Confirmed ?Type
famotidine 40 mg tablet 40 mg PO DAILY PRN acid reflux 02/03/25 02/03/25 History


Allergies

Allergy/AdvReac Type Severity Reaction Status Date / Time
Milk Containing Products Allergy  Abdominal Verified 02/03/25 13:28
(Dairy)   Pain  



Exam
Narrative
Exam Narrative: 
Constitutional: Awake, alert, comfortable, well-appearing, nontoxic, interactive, vital signs as charted 

Head: Normocephalic, atraumatic

Eyes: Conjunctiva and lids normal to inspection 

ENT: Naris patent, posterior oropharynx clear, oral mucosa moist 

Neck: Supple, normal appearance, normal range of motion, no meningeal signs 

Respiratory: No respiratory distress, breath sounds clear 

Cardiovascular: Regular rate and rhythm, strong and regular heart tones 

Skin: No rashes or induration, no lesions, only visible skin inspected 

Neuro: No neurological deficits, normal sensation 

Psychiatric: Oriented ?3, normal affect

Assessment and Plan
Assessment and Plan
(1) Plantar fascial fibromatosis of left foot: 
(2) Left Achilles tendinitis: 
(3) Left ankle instability: 
(4) Contracture, left ankle: 
(5) Pain in left ankle and joints of left foot: 

Plan
Left ankle stress exam under intraoperative fluoroscopy with possible arthroscopy, lateral ankle stabilization and peroneal tendon repair versus debridement, left endoscopic plantar fasciotomy and Paige gastrocnemius recession scheduled with Dr. Hammond February 10, 2025.

## 2025-02-10 ENCOUNTER — HOSPITAL ENCOUNTER (OUTPATIENT)
Age: 50
Discharge: HOME | End: 2025-02-10
Payer: COMMERCIAL

## 2025-02-10 VITALS — HEART RATE: 85 BPM

## 2025-02-10 VITALS — SYSTOLIC BLOOD PRESSURE: 140 MMHG | OXYGEN SATURATION: 99 % | HEART RATE: 76 BPM | DIASTOLIC BLOOD PRESSURE: 92 MMHG

## 2025-02-10 VITALS — HEART RATE: 63 BPM | OXYGEN SATURATION: 99 % | SYSTOLIC BLOOD PRESSURE: 111 MMHG | DIASTOLIC BLOOD PRESSURE: 75 MMHG

## 2025-02-10 VITALS
DIASTOLIC BLOOD PRESSURE: 84 MMHG | HEART RATE: 68 BPM | TEMPERATURE: 96.98 F | OXYGEN SATURATION: 96 % | SYSTOLIC BLOOD PRESSURE: 119 MMHG

## 2025-02-10 VITALS — DIASTOLIC BLOOD PRESSURE: 81 MMHG | SYSTOLIC BLOOD PRESSURE: 129 MMHG | HEART RATE: 75 BPM | OXYGEN SATURATION: 96 %

## 2025-02-10 VITALS — OXYGEN SATURATION: 99 % | SYSTOLIC BLOOD PRESSURE: 120 MMHG | DIASTOLIC BLOOD PRESSURE: 90 MMHG | HEART RATE: 77 BPM

## 2025-02-10 VITALS
TEMPERATURE: 96.8 F | SYSTOLIC BLOOD PRESSURE: 159 MMHG | OXYGEN SATURATION: 96 % | HEART RATE: 80 BPM | DIASTOLIC BLOOD PRESSURE: 99 MMHG

## 2025-02-10 VITALS — BODY MASS INDEX: 36.3 KG/M2

## 2025-02-10 VITALS
OXYGEN SATURATION: 96 % | TEMPERATURE: 97.16 F | DIASTOLIC BLOOD PRESSURE: 84 MMHG | SYSTOLIC BLOOD PRESSURE: 131 MMHG | HEART RATE: 87 BPM

## 2025-02-10 VITALS — DIASTOLIC BLOOD PRESSURE: 80 MMHG | HEART RATE: 91 BPM | SYSTOLIC BLOOD PRESSURE: 123 MMHG

## 2025-02-10 VITALS — SYSTOLIC BLOOD PRESSURE: 119 MMHG | HEART RATE: 69 BPM | OXYGEN SATURATION: 97 % | DIASTOLIC BLOOD PRESSURE: 90 MMHG

## 2025-02-10 DIAGNOSIS — M72.2: Primary | ICD-10-CM

## 2025-02-10 DIAGNOSIS — M25.372: ICD-10-CM

## 2025-02-10 DIAGNOSIS — K21.9: ICD-10-CM

## 2025-02-10 DIAGNOSIS — M24.572: ICD-10-CM

## 2025-02-10 DIAGNOSIS — M76.62: ICD-10-CM

## 2025-02-10 LAB
ADD MANUAL DIFF: NO
HCT VFR BLD CALC: 43.3 % (ref 36–48)
HEMATOCRIT: 43.3 % (ref 36–48)
HEMOGLOBIN: 14.5 G/DL (ref 12–16)
IMMATURE GRANULOCYTES ABS AUTO: 0.03 10^3/UL (ref 0–0.03)
IMMATURE GRANULOCYTES PCT AUTO: 0.4 % (ref 0–0.5)
LYMPHOCYTES  ABSOLUTE AUTO: 2.7 10^3/UL (ref 1.2–3.8)
MCV RBC: 92.1 FL (ref 81–99)
MEAN CORPUSCULAR HEMOGLOBIN: 30.9 PG (ref 26.7–34)
MEAN CORPUSCULAR HGB CONC: 33.5 G/DL (ref 29.9–35.2)
MEAN CORPUSCULAR VOLUME: 92.1 FL (ref 81–99)
PLATELET # BLD: 264 10^3/UL (ref 150–450)
PLATELET COUNT: 264 10^3/UL (ref 150–450)
RED BLOOD COUNT: 4.7 10^6/UL (ref 4.2–5.4)
WBC # BLD: 6.8 10^3/UL (ref 4–11)
WHITE BLOOD COUNT: 6.8 10^3/UL (ref 4–11)

## 2025-02-10 PROCEDURE — C1713 ANCHOR/SCREW BN/BN,TIS/BN: HCPCS

## 2025-02-10 PROCEDURE — 84703 CHORIONIC GONADOTROPIN ASSAY: CPT

## 2025-02-10 PROCEDURE — 82948 REAGENT STRIP/BLOOD GLUCOSE: CPT

## 2025-02-10 PROCEDURE — 29893 ENDOSCOPIC PLANTR FASCIOTOMY: CPT

## 2025-02-10 PROCEDURE — 27687 REVISION OF CALF TENDON: CPT

## 2025-02-10 PROCEDURE — 36415 COLL VENOUS BLD VENIPUNCTURE: CPT

## 2025-02-10 PROCEDURE — 27698 REPAIR OF ANKLE LIGAMENT: CPT

## 2025-02-10 PROCEDURE — 64450 NJX AA&/STRD OTHER PN/BRANCH: CPT

## 2025-02-10 PROCEDURE — 73610 X-RAY EXAM OF ANKLE: CPT

## 2025-02-10 PROCEDURE — 76942 ECHO GUIDE FOR BIOPSY: CPT

## 2025-02-10 PROCEDURE — 76000 FLUOROSCOPY <1 HR PHYS/QHP: CPT

## 2025-02-10 PROCEDURE — 64445 NJX AA&/STRD SCIATIC NRV IMG: CPT

## 2025-02-10 PROCEDURE — 85025 COMPLETE CBC W/AUTO DIFF WBC: CPT

## 2025-02-10 NOTE — FL_ITS
[address] [address] 
     [phone] 
  
  
Patient Name: 
MAGGY CARPIO 
  
MRN: [id number]    YOB: 1975    Sex: F 
[facility] 
Current Patient Location: [facility] 
Accession/Order Number: [id number] 
Exam Date: 2/10/2025  13:45    Report Date: 2/14/2025  08:20 
  
At the request of: 
ANH MCLEOD   
  
Procedure:  FL fluoroscopy <1hr NON-READ 
  
EXAM: FL fluoroscopy <1hr NON-READ  
  
HISTORY: 
  
TECHNIQUE: 
  
FINDINGS: Please see Operative Report. 
  
  
Electronically authenticated by: RADIOLOGIST  NO   Date: 2/14/2025  08:20 History of Present Illness  Flor Florence is a 57 year old female, who presented today to urgent care with a chief complaint of sinus pressure.    Patient states she has had sinus pressure and congestion for 4 days. She notes the pressure is under the cheeks and around her eyes. She has a slight cough as well as post-nasal drip. She reports her best friend who she hangs out with is sick with similar symptoms. She endorses some intermittent ear pressure but no significant pain. She took 2 COVID tests at home that were negative. She denies vomiting, diarrhea or fevers. She tried Sudafed and sinus medications OTC.           Review of Systems  Pertinent ROS discussed in HPI. The remaining systems have been reviewed and are negative.     Medications and Allergies  Medications:  Current Outpatient Medications   Medication Sig Dispense Refill    QUEtiapine (SEROquel) 25 MG tablet Take 1 tablet by mouth daily as needed (for sleep and/or anxiety). 90 tablet 0    QUEtiapine (SEROquel XR) 50 MG 24 hr tablet Take 2 tablets by mouth at bedtime. 180 tablet 0    buPROPion XL (WELLBUTRIN XL) 300 MG 24 hr tablet Take 1 tablet by mouth daily. 90 tablet 0    famotidine (PEPCID) 20 MG tablet 1 po bid 60 tablet 1    levothyroxine 125 MCG tablet Take 1 tablet a day by mouth six days of the week. Omit dose on Sunday 30 tablet 1    budesonide-formoterol (Symbicort) 160-4.5 MCG/ACT inhaler Inhale 2 puffs into the lungs in the morning and 2 puffs in the evening. 10.2 g 12    albuterol 108 (90 Base) MCG/ACT inhaler Inhale 2 puffs into the lungs every 4 hours as needed for Shortness of Breath or Wheezing. 1 each 1    lisdexamfetamine (VYVANSE) 40 MG capsule Take 1 capsule by mouth every morning. And discontinue adderall 30 capsule 0    Selenium 200 MCG Cap Take 200 mcg by mouth daily.      ibuprofen (MOTRIN) 600 MG tablet Take 1 tablet by mouth every 6 hours as needed for Pain. 30 tablet 0     No current facility-administered medications  for this visit.       Allergies:  ALLERGIES:   Allergen Reactions    Balsam Of [place] [Balsam [place]-Zn Stear-Boric] RASH     Contact dermatitis       Past Medical History  Past Medical History:   Diagnosis Date    Acute bronchitis     ADD (attention deficit hyperactivity disorder, inattentive type) 09/23/2016    Attention deficit hyperactivity disorder (ADHD), predominantly inattentive type 09/23/2016    Cholestatic liver disease (CMD) 05/15/2016    COVID-19 virus infection 01/2022    CTS (carpal tunnel syndrome) 02/18/2014    Eczema     eczema    Epicondylitis, lateral, left 02/18/2014    Graves disease 01/12/2016    had radiation, had reaction to medication    Mixed hyperlipidemia     Old complex tear of lateral meniscus of left knee 05/03/2017    DOI    Osteoarthritis of CMC joint of thumb 04/22/2014    Personal history of traumatic fracture     Pneumonia     Unspecified asthma, with status asthmaticus 1997    Unspecified sinusitis (chronic)     Urinary incontinence     Vitamin D insufficiency 11/13/2019       Past Surgical History  Past Surgical History:   Procedure Laterality Date    Carpal tunnel release Right 03/12/2014    Dr Josias Hunt    Carpal tunnel release Left 07/01/2022    Dr Pritchett    Eye surgery Bilateral     lazy eye repair    Gynecologic cryosurgery N/A     Knee arthroscopy w/ debridement Left 05/25/2017    Dr. STU Marion    Mammo stereotactic biopsy w tramaine left Left 10/26/2022    Lt. Breast--Benign breast tissue with usuall ductal hyperplasia, cystic apocrine metaplasia, colymnar cell changes, dilated ducts, stromal fibrosis, and focal PASH.    Remove tonsils/adenoids,<13 y/o N/A 01/01/1979    Tonsillectomy w Adenoids (child)    Sinus surgery proc unlisted N/A 01/01/1990 1985 & 1987    Sling oper stres incontinence N/A 01/30/2013    [facility]    Tonsillectomy and adenoidectomy N/A        Social and Family History  Social History:  Social History     Socioeconomic History    Marital status:  /Civil Union     Spouse name: Cortes    Number of children: 2    Years of education: 16    Highest education level: Not on file   Occupational History     Comment:      Occupation:      Employer: [employer]   Tobacco Use    Smoking status: Never     Passive exposure: Never    Smokeless tobacco: Never   Vaping Use    Vaping status: never used   Substance and Sexual Activity    Alcohol use: Yes     Alcohol/week: 0.0 standard drinks of alcohol     Comment: rarely    Drug use: No    Sexual activity: Yes     Partners: Male   Other Topics Concern     Service Not Asked    Blood Transfusions No    Caffeine Concern No    Occupational Exposure No    Hobby Hazards No    Sleep Concern Not Asked    Stress Concern Yes     Comment: sons' health    Weight Concern No    Special Diet Not Asked    Back Care Not Asked    Exercise Not Asked    Bike Helmet Not Asked    Seat Belt Yes    Self-Exams Not Asked   Social History Narrative    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        2019    Son with back surgery abscess     Hemoptysis/ icu post op    Need reop with leads in back    2020    covid    2021         Social Determinants of Health     Financial Resource Strain: Not on file   Food Insecurity: Not on file   Transportation Needs: Not on file   Physical Activity: Not on file   Stress: Not on file   Social Connections: Not on file   Interpersonal Safety: Not on file (4/12/2022)       Family History:  Family History   Problem Relation Age of Onset    Ophthalmology Mother         macular degeneration    Psychiatric Mother     Hyperlipidemia Mother     Colon Polyps Father     Hyperlipidemia Brother     Motor Vehicle Accident Brother     Asthma Son     Genetic Disorder Son 13        Brittaney'deepali SANCHEZ    Other  Son         autism spec/neurol d/o poorly characterized    Cancer, Prostate Maternal Uncle 50    Ophthalmology Maternal Grandmother         macular degeneration    Alcohol Abuse Maternal Grandfather     Cancer, Prostate Maternal Grandfather 50    Cancer, Breast Paternal Cousin         age:  50's    Cancer, Breast Maternal Cousin 47    Cancer, Breast Maternal Cousin         age:  50's       Objective Findings  Vital Signs:  Vitals:    07/03/24 1157   BP: 138/72   BP Location: RUE - Right upper extremity   Patient Position: Sitting   Pulse: 78   Resp: 16   Temp: 98 °F (36.7 °C)   TempSrc: Oral   SpO2: 97%   Weight: 75.5 kg (166 lb 7.2 oz)   LMP: 09/20/2023            Physical Exam:  Physical Exam  Vitals and nursing note reviewed.   Constitutional:       General: She is not in acute distress.  HENT:      Head: Normocephalic.      Right Ear: Tympanic membrane normal.      Left Ear: Tympanic membrane normal.      Nose: Nose normal.      Mouth/Throat:      Mouth: Mucous membranes are moist.      Pharynx: No oropharyngeal exudate or posterior oropharyngeal erythema.   Eyes:      Extraocular Movements: Extraocular movements intact.      Conjunctiva/sclera: Conjunctivae normal.   Cardiovascular:      Rate and Rhythm: Normal rate and regular rhythm.      Pulses: Normal pulses.   Pulmonary:      Effort: Pulmonary effort is normal.      Breath sounds: No wheezing, rhonchi or rales.   Musculoskeletal:         General: Normal range of motion.      Cervical back: Normal range of motion and neck supple.   Skin:     General: Skin is warm.      Capillary Refill: Capillary refill takes less than 2 seconds.   Neurological:      General: No focal deficit present.      Mental Status: She is alert.          Labs:    No laboratory tests were ordered during this visit.    EKG:   No EKGs were ordered during this visit.    Imaging:    No imaging studies were ordered during this visit.    Medical Decision Making  Flor Florence is a 57  year old female, who presented today to urgent care with a chief complaint of sinus congestion for 4 days.    Presents afebrile and hemodynamically stable.  On exam patient is well-appearing.  She has normal work of breathing with clear lungs.  She has no signs of acute otitis media or externa.    Discussed with patient given her duration of symptoms and known sick contacts that her sinus infection is most likely secondary to a viral illness.  Recommended Flonase over-the-counter.  Patient states she has some at home but has not used it.  Went over administration instructions of Flonase.  Discussed with patient we do not start antibiotics for sinus infections until 10 or more days of symptoms.  Patient had no further questions.  She was offered COVID-19 and influenza testing which she declines today.    During my evaluation and prior to leaving urgent care, patient was stable, and non-toxic in appearance.    Recommended return to urgent care for other new or different symptoms and return to a hospital based emergency department if they begin to develop acute worsening in symptoms, including difficulty breathing, chest pain, palpitations, SOB, fevers, chills.     Appropriate follow up was recommended.     Impression/Diagnoses  1. Viral URI with cough        Procedures  N/A    The patient and I engaged in shared decision making in the development of their care plan, and the patient or patient surrogate was given an opportunity to ask questions. All were answered to the patient’s apparent satisfaction and understanding.    Polo Kulkarni PA-C  12:11 PM  07/03/24

## 2025-02-10 NOTE — XR_ITS
The [address] [address] 
     [phone] 
  
  
Patient Name: 
MAGGY CARPIO 
  
MRN: [id number]    YOB: 1975    Sex: F 
Assigned Patient Location: [facility] 
Current Patient Location:   
Accession/Order Number: [id number] 
Exam Date: 2/10/2025  15:15    Report Date: 2/11/2025  09:54 
  
At the request of: 
ANH MCLEOD   
  
Procedure:  XR ankle LT min 3V 
  
PROCEDURE: XR ankle LT min 3V 
  
COMPARISON: 1/21/2025 
  
HISTORY: instability 
  
FINDINGS: 
BONES:No fracture, acute abnormality, or significant arthropathy.  
SOFT TISSUES:Postprocedural subcutaneous air 
EFFUSION:None visible.  
OTHER: Negative.  
  
ORDER #: [id number] XR/XR ankle LT min 3V  
IMPRESSION:  
Postprocedural subcutaneous air.  
   
   
Electronically authenticated by: SARWAT CARRILLO   Date: 2/11/2025  09:54

## 2025-02-10 NOTE — P.ORON_ITS
Brief Operative Note    
Date of procedure: 02/10/25    
Pre-op diagnosis general: Left plantar fasciitis, equinus, possible ankle inst  
ability    
Post-op diagnosis: other (Left plantar fasciitis, equinus and lateral ankle   
instability)    
Procedure:     
Procedure performed: Left endoscopic plantar fasciotomy, gastrocnemius recession  
and modified Brostr?m Suazo lateral ankle stabilization with stress examination   
under intraoperative fluoroscopy    
    
Indications for procedure: Patient is a 49-year-old female well-known to my   
practice who underwent right lateral ankle stabilization in 2017.  She recently   
presented to my office relating to left ankle sprain in 2020 and intermittent an  
kle pain since then.  In addition over the last year she has had persistent,   
worsening left plantar heel pain and underwent nonsurgical treatment for plantar  
fasciitis.  Nonsurgical treatment included to plantar fascial injections, shoe   
and activity modification, immobilization in a cam boot as well as OTC pain   
medicine.  An MRI was then ordered by her treating physician which demonstrated   
evidence of prior ankle sprain as well as thickening of the plantar fascial   
medial band consistent with plantar fasciitis as well as insertional Achilles   
tendinitis.  I spoke to her over the phone regarding risks, benefits and   
postoperative course and she wished to proceed with surgical intervention.  On   
examination in the preoperative holding area there was no pain over the peroneal  
tendons.  No peroneal tendon subluxation and strength was 5/5 on eversion.    
Patient did have pain predominantly in the plantar medial heel step and less so   
at the insertion of the Achilles tendon.  Anterior drawer was guarded and   
painful.  I recommended that we stress her ankle and if unstable perform lateral  
ankle stabilization in addition to plantar fasciotomy and gastrocnemius   
recession.  Patient agreed and all questions were answered to her satisfaction.    
    
Intraoperative findings: Medial and central bands of the plantar fascia were   
thickened and taut consistent with plantar fasciitis.  Ankle joint dorsiflexion   
was to neutral but not past with the knee extended.  Positive anterior drawer   
noted on intraoperative fluoroscopy and the ATFL was thickened and lax   
consistent with chronic instability.    
    
Procedure in detail: Patient was identified preoperatively by myself which time   
correct side site were marked and consent was obtained.  Regional anesthesia was  
performed by the anesthesia team.  Preoperative antibiotics were started and   
patient was brought back in the operating theater placed on table in supine   
position.  General anesthesia was administered and the left lower extremity was   
prepped and draped in usual sterile fashion.  Formal timeout was performed and   
the left lower extremity was exsanguinated and tourniquet was inflated.  Under   
intraoperative fluoroscopy the ankle was stressed in all planes and had a   
notably positive anterior drawer.    
Stab incision over the medial aspect of the left in-step at the glabrous skin   
junction was used followed by blunt dissection and the medial band of the   
plantar fascia was identified. Trochar and cannula were then placed medial to   
lateral. A lateral stab incision was made to allow passage of the trochar and   
cannula. Camera was inserted into the lateral portal and a hook blade was placed  
into the medial portal. 50% of the plantar fascia was released and healthy   
muscle was noted. The site was flushed with saline and instrumentation was   
removed. Closure with nylon suture was then undertaken.      
Incision was then placed over the gastroc aponeurosis at the medial aspect over   
the medial leg.  Combination of sharp and blunt dissection with all bleeders   
being coagulated gained access to the gastroc aponeurosis.  Further dissection   
gained direct access to the aponeurosis and a speculum was inserted to protect   
the sural nerve and vein.  The ankle joint was dorsiflexed maximally and the   
aponeurosis was incised with a scalpel transversely which allowed 10 degrees of   
ankle joint dorsiflexion to be gained.  Surgical site was irrigated with copious  
saline.    
Curvilinear incision was placed over the distal lateral malleolus at the   
anterior aspect.  Combination of sharp and blunt dissection with all bleeders   
being coagulated gained access to the anterior talofibular ligament which was   
incised sharply just distal to the fibular attachment.  The fibular attachment   
was raised slightly to gain access to the cortical bone of the lateral malleolus  
at the anterior aspect.  Rongeur was used to create a trough in the lateral   
malleolus and 2 drill holes were placed accordingly.  3.3 mm suture anchors were  
then placed accordingly each with 4 sutures.  The extensor retinaculum was   
dissected out meticulously then all 8 sutures were placed through the ATFL and   
the extensor retinaculum.  Then 4 sutures were placed through the fibular   
attachment as well as the periosteum from the lateral malleolus.  The ankle   
joint was maximally dorsiflexed and everted while all sutures were tied and cut.  
 Anterior drawer was negative and range of motion of the ankle was supple.    
Surgical sites were irrigated with saline and the incisions were then closed in   
layers.  Tourniquet was deflated with a prompt hyperemic response.  Patient was   
transferred to the recovery room with vital signs stable and brisk capillary   
refill to left toes.    
    
Postoperative plan:    
Discharge home under 's care    
Nonweightbearing left ankle    
Elevate above level of heart is much as possible    
Keep splint clean dry and intact    
Prescriptions were sent to her pharmacy using my office EMR    
Follow-up in 1 week for hopeful transition to a cam boot    
Implants:     
Medline 3.3 mm suture anchors    
Anesthesia: regional and General-LMA    
Surgeon: Doug Hammond    
Assistant: Cheyenne Eller    
Estimated blood loss (mL): 10    
Pathology: none sent    
Condition: stable    
Disposition: PACU

## 2025-02-10 NOTE — PM.ORONB
Brief Operative Note
Date of procedure: 02/10/25
Pre-op diagnosis general: Left plantar fasciitis, equinus, possible ankle instability
Post-op diagnosis: other (Left plantar fasciitis, equinus and lateral ankle instability)
Procedure: 
Procedure performed: Left endoscopic plantar fasciotomy, gastrocnemius recession and modified Brostr?m Suazo lateral ankle stabilization with stress examination under intraoperative fluoroscopy

Indications for procedure: Patient is a 49-year-old female well-known to my practice who underwent right lateral ankle stabilization in 2017.  She recently presented to my office relating to left ankle sprain in 2020 and intermittent ankle pain 
since then.  In addition over the last year she has had persistent, worsening left plantar heel pain and underwent nonsurgical treatment for plantar fasciitis.  Nonsurgical treatment included to plantar fascial injections, shoe and activity 
modification, immobilization in a cam boot as well as OTC pain medicine.  An MRI was then ordered by her treating physician which demonstrated evidence of prior ankle sprain as well as thickening of the plantar fascial medial band consistent with 
plantar fasciitis as well as insertional Achilles tendinitis.  I spoke to her over the phone regarding risks, benefits and postoperative course and she wished to proceed with surgical intervention.  On examination in the preoperative holding area 
there was no pain over the peroneal tendons.  No peroneal tendon subluxation and strength was 5/5 on eversion.  Patient did have pain predominantly in the plantar medial heel step and less so at the insertion of the Achilles tendon.  Anterior drawer 
was guarded and painful.  I recommended that we stress her ankle and if unstable perform lateral ankle stabilization in addition to plantar fasciotomy and gastrocnemius recession.  Patient agreed and all questions were answered to her satisfaction.

Intraoperative findings: Medial and central bands of the plantar fascia were thickened and taut consistent with plantar fasciitis.  Ankle joint dorsiflexion was to neutral but not past with the knee extended.  Positive anterior drawer noted on 
intraoperative fluoroscopy and the ATFL was thickened and lax consistent with chronic instability.

Procedure in detail: Patient was identified preoperatively by myself which time correct side site were marked and consent was obtained.  Regional anesthesia was performed by the anesthesia team.  Preoperative antibiotics were started and patient was 
brought back in the operating theater placed on table in supine position.  General anesthesia was administered and the left lower extremity was prepped and draped in usual sterile fashion.  Formal timeout was performed and the left lower extremity 
was exsanguinated and tourniquet was inflated.  Under intraoperative fluoroscopy the ankle was stressed in all planes and had a notably positive anterior drawer.
Stab incision over the medial aspect of the left in-step at the glabrous skin junction was used followed by blunt dissection and the medial band of the plantar fascia was identified. Trochar and cannula were then placed medial to lateral. A lateral 
stab incision was made to allow passage of the trochar and cannula. Camera was inserted into the lateral portal and a hook blade was placed into the medial portal. 50% of the plantar fascia was released and healthy muscle was noted. The site was 
flushed with saline and instrumentation was removed. Closure with nylon suture was then undertaken.  
Incision was then placed over the gastroc aponeurosis at the medial aspect over the medial leg.  Combination of sharp and blunt dissection with all bleeders being coagulated gained access to the gastroc aponeurosis.  Further dissection gained direct 
access to the aponeurosis and a speculum was inserted to protect the sural nerve and vein.  The ankle joint was dorsiflexed maximally and the aponeurosis was incised with a scalpel transversely which allowed 10 degrees of ankle joint dorsiflexion to 
be gained.  Surgical site was irrigated with copious saline.
Curvilinear incision was placed over the distal lateral malleolus at the anterior aspect.  Combination of sharp and blunt dissection with all bleeders being coagulated gained access to the anterior talofibular ligament which was incised sharply just 
distal to the fibular attachment.  The fibular attachment was raised slightly to gain access to the cortical bone of the lateral malleolus at the anterior aspect.  Rongeur was used to create a trough in the lateral malleolus and 2 drill holes were 
placed accordingly.  3.3 mm suture anchors were then placed accordingly each with 4 sutures.  The extensor retinaculum was dissected out meticulously then all 8 sutures were placed through the ATFL and the extensor retinaculum.  Then 4 sutures were 
placed through the fibular attachment as well as the periosteum from the lateral malleolus.  The ankle joint was maximally dorsiflexed and everted while all sutures were tied and cut.  Anterior drawer was negative and range of motion of the ankle 
was supple.  Surgical sites were irrigated with saline and the incisions were then closed in layers.  Tourniquet was deflated with a prompt hyperemic response.  Patient was transferred to the recovery room with vital signs stable and brisk capillary 
refill to left toes.

Postoperative plan:
Discharge home under 's care
Nonweightbearing left ankle
Elevate above level of heart is much as possible
Keep splint clean dry and intact
Prescriptions were sent to her pharmacy using my office EMR
Follow-up in 1 week for hopeful transition to a cam boot
Implants: 
Medline 3.3 mm suture anchors
Anesthesia: regional and General-LMA
Surgeon: Doug Hammond
Assistant: Cheyenne Eller
Estimated blood loss (mL): 10
Pathology: none sent
Condition: stable
Disposition: PACU

## 2025-02-10 NOTE — XMS_ITS
Comprehensive CCD (C-CDA v2.1)  
  
                          Created on: February 10, 2025  
  
  
MAGGY CARPIO  
External Reference #: CDR,PersonID:[id number]  
: 1975  
Sex: Female  
  
Demographics  
  
  
                                        Address             [address]  [address]  
   
                                        Home Phone          [phone]  
   
                                        Work Phone          [phone]  
   
                                        Home Phone          [phone]  
   
                                        Work Phone          [phone]  
   
                                        Home Phone          [phone]  
   
                                        Preferred Language  en  
   
                                        Marital Status        
   
                                        Mu-ism Affiliation Unknown  
   
                                        Race                White  
   
                                        Ethnic Group        Not  or Lati  
no  
  
  
Author  
  
  
                                        Organization        [facility]  
  
  
Care Team Providers  
  
  
                                Care Team Member Name Role            Phone  
   
                                Jb Apodaca Unavailable     [phone]  
   
                                Rachelle Alexander N  Unavailable     [phone]  
   
                                Alexander, Rachelle N  Unavailable     [phone]  
   
                                Alexander Rachelle N  Unavailable     [phone]  
   
                                Alexander Rachelle N  Unavailable     [phone]  
   
                                Alexander Rachelle N  Unavailable     [phone]  
   
                                Alexander, Rachelle N  Unavailable     [phone]  
   
                                ANH MCLEOD Admitting       Unavailable  
   
                                ANH MCLEOD Attending       Unavailable  
   
                                DR SARWAT CARRILLO Consulting      Unavailable  
   
                                ANH MCLEOD Consulting      Unavailable  
   
                                Jose Sloan MD Primary Care Provider 1  
[phone]  
   
                                Jose Sloan MD Primary Care Provider 1  
[phone]  
   
                                Dr. Krystian Sloan Primary Care Provider [phone]  
[phone]  
   
                                Craolina Sunshine     Attending Provider Unavailable  
   
                                Dr. Krystian Sloan Referring Provider [phone]  
[phone]  
   
                                Dr. Faye Valero Attending Provider [phone]  
   
                                Yusra Maria Attending Provider Unavailable  
   
                                Dr. Faye Valero Other Provider  [phone]  
   
                                JOSE SLOAN MD Consulting      Unavailab  
le  
   
                                JANIE LAUREN-URGENT/OCC MED Primary Care    Unav  
ailable  
   
                                JANIE, LAUREN-URGENT/OCC MED Attending       Unav  
ailable  
   
                                JANIE LAUREN-URGENT/OCC MED Admitting       Unav  
ailable  
   
                                PROVIDER, UNKNOWN Consulting      Unavailable  
   
                                JOSE SLOAN MD Consulting      Unavailab  
SHANI Waldrop MD Primary Care    Unavailable  
   
                                SHANI SANCHEZ MD Attending       Unavailable  
   
                                SHANI SANCHEZ MD Admitting       Unavailable  
   
                                PROVIDER, UNKNOWN Consulting      Unavailable  
   
                                JOSE SLOAN MD Consulting      Unavailab  
le  
   
                                POMPEY, KAYLYN PAC Primary Care    Unavailable  
   
                                POMPEY, KAYLYN PAC Attending       Unavailable  
   
                                POMPEY, KAYLYN PAC Admitting       Unavailable  
   
                                PROVIDER, UNKNOWN Consulting      Unavailable  
   
                                JOSE SLOAN MD Consulting      Unavailab  
PRABHU Urbano DR Primary Care    Unavailable  
   
                                PRABHU SCHMIDT DR Attending       Unavailable  
   
                                PRABHU SCHMIDT DR Admitting       Unavailable  
   
                                PROVIDER, UNKNOWN Consulting      Unavailable  
   
                                JOSE SLOAN MD Consulting      Unavailab  
PRABHU [address] Primary Care    Unavailable  
   
                                PRABHU SCHMIDT DR Attending       Unavailable  
   
                                PRABHU SCHMIDT DR Admitting       Unavailable  
   
                                PROVIDER, UNKNOWN Consulting      Unavailable  
   
                                JOSE SLOAN MD Consulting      Unavailab  
PRABHU [address] Primary Care    Unavailable  
   
                                PRABHU SCHMIDT DR Attending       Unavailable  
   
                                PRABHU SCHMIDT DR Admitting       Unavailable  
   
                                PROVIDER, UNKNOWN Consulting      Unavailable  
   
                                JOSE SLOAN MD Consulting      Unavailab  
le  
   
                                POMPEY, KAYLYN PAC Primary Care    Unavailable  
   
                                POMPEY, KAYLYN PAC Attending       Unavailable  
   
                                POMPEY, KAYLYN PAC Admitting       Unavailable  
   
                                PROVIDER, UNKNOWN Consulting      Unavailable  
   
                                JOSE SLOAN MD Consulting      Unavailab  
SHANI Waldrop MD Primary Care    Unavailable  
   
                                SHANI SANCHEZ MD Attending       Unavailable  
   
                                SHANI SANCHEZ MD Admitting       Unavailable  
   
                                PROVIDER, UNKNOWN Consulting      Unavailable  
   
                                JOSE SLOAN MD Consulting      Unavailab  
SHANI Waldrop MD Primary Care    Unavailable  
   
                                JOSE SLOAN MD Referring       Unavailab  
SHANI Waldrop MD Attending       Unavailable  
   
                                SHANI SANCHEZ MD Admitting       Unavailable  
   
                                PROVIDER, UNKNOWN Consulting      Unavailable  
   
                                JOSE SLOAN MD Consulting      Unavailab  
SHANI Waldrop MD Primary Care    Unavailable  
   
                                SHANI SANCHEZ MD Attending       Unavailable  
   
                                SHANI SANCHEZ MD Admitting       Unavailable  
   
                                PROVIDER, UNKNOWN Consulting      Unavailable  
   
                                JOSE SLOAN MD Consulting      Unavailab  
SHANI Waldrop MD Primary Care    Unavailable  
   
                                SHANI SANCHEZ MD Attending       Unavailable  
   
                                SHANI SANCHEZ MD Admitting       Unavailable  
   
                                PROVIDER, UNKNOWN Consulting      Unavailable  
   
                                MARIYA Lopes Attending Provider [phone]  
[phone]  
   
                                Dr. Krystian Sloan Primary Care Provider [phone]  
[phone]  
   
                                Dr. Krystian Sloan Referring Provider [phone]  
   
                                Dr. Faye Valero Attending Provider [phone]  
   
                                Yusra Maria Attending Provider Unavailable  
   
                                Dr. Faye Valero Other Provider  [phone]  
[phone]  
   
                                MARIYA Lopes Attending Provider [phone]  
[phone]  
   
                                MARIYA Lopes Referring Provider [phone]  
[phone]  
   
                                Jose Sloan MD Primary Care Provider 1  
[phone]  
   
                                Dr. Krystian Sloan Primary Care Provider [phone]  
[phone]  
   
                                Dr. Krystian Sloan Referring Provider [phone]  
[phone]  
   
                                Dr. Faye Valero Attending Provider [phone]  
[phone]  
   
                                Dr. Krystian Sloan Primary Care Provider [phone]  
[phone]  
   
                                Dr. Krystian Sloan Referring Provider [phone]  
[phone]  
   
                                Dr. Faye Valero Attending Provider [phone]  
   
                                SHANI HOUSE Referring       Unavail  
able  
   
                                JOSE SLOAN Primary Care    Unavailabl  
e  
   
                                CEBUFAYE OMER   Referring       Unavailable  
   
                                JOSE SLOAN Primary Care    Unavailabl  
e  
   
                                CEBUFAYE OMER   Referring       Unavailable  
   
                                JOSE SLOAN Primary Care    Unavailabl  
e  
   
                                SHANI HOUSE Attending       Unavail  
able  
   
                                JOSE SLOAN Primary Care    Unavailabl  
e  
   
                                Dr. Jose Sloan Primary Care Provider [phone]  
[phone]  
   
                                Dr. Jose Sloan Referring Provider [phone]  
[phone]  
   
                                MARIYA Lopes Attending Provider [phone]  
[phone]  
   
                                RANNEY, CHRISTOPHER BEHR Referring       Unavail  
able  
   
                                RANNEY, CHRISTOPHER BEHR Primary Care    Unavail  
able  
   
                                Ranney MD, Christopher Behr Primary Care Provide  
[phone]  
   
                                Faye Valero Attending       Unavailable  
   
                                Faye Valero Referring       Unavailable  
   
                                Jose Sloan Primary Care    Unavailable  
   
                                Jose Sloan Referring       Unavailable  
   
                                Luther, Jose Primary Care    Unavailable  
   
                                Doreen Lopes Attending       Unavailable  
   
                                Jose Sloan Referring       Unavailable  
   
                                Luther, Jose Primary Care    Unavailable  
   
                                Doreen Lopes Attending       Unavailable  
   
                                Jose Sloan Referring       Unavailable  
   
                                Luther, Jose Primary Care    Unavailable  
   
                                Faye Valero Attending       Unavailable  
   
                                Faye Valero Attending       Unavailable  
   
                                Jose Sloan Referring       Unavailable  
   
                                [facility]    [facility]  
   
                                [facility]    Unavailable  
   
                                Faye Valero Attending       Unavailable  
   
                                Jose Sloan Referring       Unavailable  
   
                                Jose Sloan Referring       Unavailable  
   
                                [facility]    Unavailable  
   
                                Faye Valero Attending       Unavailable  
   
                                SHEYLA BEAUCHAMP   Attending       Unavailable  
   
                                QUYNH JUNNA   Referring       Unavailable  
   
                                Jose Sloan Primary Care    Unavailable  
   
                                Joes Sloan Primary Care    Unavailable  
   
                                Jose Sloan Attending       Unavailable  
  
  
  
Allergies  
  
  
                                                    Allergy   
Classification                          Reported   
Allergen(s)               Allergy Type              Date of   
Onset                     Reaction(s)               Facility  
   
                                                      
(1 source)                              ALLERGIES NOT ON   
FILE;   
Translations:   
[ALLERGIES NOT   
ON FILE]                                Propensity to   
adverse   
reactions   
(disorder)                                                  [facility]   
Repository  
  
  
  
Medications  
Current Medications  
  
  
  
                      Medication Drug Class(es) Dates      Sig (Normalized) Sig   
(Original)  
   
                                                    B-Complex With   
Vitamin C  
(10 sources)                                        Start:   
2017                                          B-Complex With   
Vitamin C Active 1   
EACH PO DAILY May   
5th, 2017 11:02am  
  
  
  
                                                    Start: 2017  
End: 2024                                     B-Complex With Vitamin C Dis  
continued 1 EACH PO DAILY May 5th,   
2017 12:00am 2024 4:15pm  
   
                                                    Start: 2017  
End: 2024                                     B-Complex With Vitamin C Dis  
continued 1 EACH PO DAILY May 4th,   
2017 11:00pm 2024 3:15pm  
   
                                Start: 2017                 B-Complex With  
 Vitamin C Active 1 EACH PO DAILY May 5th, 2017  
  
12:00am  
   
                                Start: 2017                 B-Complex With  
 Vitamin C Active 1 EACH PO DAILY May 4th, 2017  
  
11:00pm  
  
  
  
                                                    cholecalciferol 0.125   
mg oral capsule  
(20 sources)        Vitamin D           Start: 2022   take 125 ug   
by mouth   
once daily                              Cholecalciferol   
(Vitamin D3) Active   
125 MCG PO DAILY   
2022   
1:00am  
  
  
  
                                                take 1 tablet by mouth once tomy  
y cholecalciferol (VITAMIN D-3) 5,000 unit tab   
Take   
5,000 Units by mouth once daily. 0 Active  
  
  
  
                                        Comment on above:   Take 5,000 Units by   
mouth once daily.   
   
                                                    docusate sodium 100   
mg oral capsule  
(3 sources)                                         Start:   
2017                              take 1 capsule by   
mouth twice daily   
as needed                               Docusate Sodium   
(Colace) 100 MG   
capsule Active 100   
MG PO TWICE DAILY   
AS NEEDED 10 May   
8th, 2017 11:00pm  
   
                                                    magnesium glycinate   
100 mg oral tablet  
(3 sources)                                         Start:   
2024                              take 100 mg by   
mouth once daily                        Magnesium Glycinate   
Active 100 MG PO   
DAILY 2024 1:00am  
   
                                                    melatonin 5 mg /   
vitamin b6 1 mg oral   
tablet  
(3 sources)                                         Start:   
2017                                          Melatonin-Pyridoxin  
e (Vit B6)   
(Melatonin 5 Mg   
Tablet) 1 EACH   
tablet Active 1   
EACH PO AS NEEDED   
May 4th, 2017   
11:00pm  
   
                                                    Mthf  
(10 sources)                                        Start:   
2017                              take 5 mg by mouth   
once daily                              Mthf Active 5 MG PO   
DAILY May 5th, 2017   
11:02am  
  
  
  
                                                    Start: 2017  
End: 2022           take 5 mg by mouth once daily Mthf Discontinued 5 MG P  
O DAILY   
May   
5th, 2017 12:00am 2022   
9:57am  
   
                                                    Start: 2017  
End: 2022           take 5 mg by mouth once daily Mthf Discontinued 5 MG P  
O DAILY   
May   
4th, 2017 11:00pm 2022   
8:57am  
  
  
  
                                                    Omega-3 Fatty Acids-Fish Oil  
   
(Fish Oil 1,000 Mg Capsule) 1   
EACH capsule  
(10 sources)                    Start: 2017                 Omega-3 Fatty   
Acids-Fish Oil   
(Fish Oil 1,000 Mg Capsule) 1   
EACH capsule Active 4 EACH PO   
DAILY May 5th, 2017 11:02am  
  
  
  
                                                    Start: 2017  
End: 2024                                     Omega-3 Fatty Acids-Fish Oil  
 (Fish Oil 1,000 Mg Capsule) 1 EACH  
  
capsule Discontinued 2 EACH PO DAILY May 5th, 2017 12:00am   
2024 4:15pm  
   
                                                    Start: 2017  
End: 2024                                     Omega-3 Fatty Acids-Fish Oil  
 (Fish Oil 1,000 Mg Capsule) 1 EACH  
  
capsule Discontinued 2 EACH PO DAILY May 4th, 2017 11:00pm   
2024 3:15pm  
   
                                Start: 2017                 Omega-3 Fatty   
Acids-Fish Oil (Fish Oil 1,000 Mg Capsule) 1   
EACH   
capsule Active 2 EACH PO DAILY May 5th, 2017 12:00am  
   
                                Start: 2017                 Omega-3 Fatty   
Acids-Fish Oil (Fish Oil 1,000 Mg Capsule) 1   
EACH   
capsule Active 2 EACH PO DAILY May 4th, 2017 11:00pm  
   
                                Start: 2017                 Omega-3 Fatty   
Acids-Fish Oil (Fish Oil 1,000 Mg Capsule) 1   
EACH   
capsule Active 4 EACH PO DAILY May 4th, 2017 11:00pm  
  
  
  
                                                    promethazine   
hydrochloride 25 mg   
oral tablet  
(3 sources)               Phenothiazine             Start:   
2017                              take 25 mg   
by mouth   
every four   
hours as   
needed                                  Promethazine   
Active 25 MG PO   
EVERY 4 HOURS AS   
NEEDED 10 May 8th,   
2017 11:00pm  
   
                                                    rizatriptan 5 mg   
oral tablet  
(20 sources)                            Serotonin-1b and   
Serotonin-1d   
Receptor Agonist                        Start:   
2017                                          Rizatriptan   
(Maxalt) 5 MG   
tablet Active 5 MG   
PO AS NEEDED May   
4th, 2017 11:00pm  
  
  
  
                                                            take 1 tablet by bert  
th every two hours   
as needed                               rizatriptan (MAXALT MLT) 5 mg disintegra  
ting   
tablet Take 5 mg by mouth as needed. May   
repeat in 2 hours if needed 0 Active  
   
                                                                MAXALT 10 MG TAB  
S as needed for headaches   
RIZATRIPTAN BENZOATE [id number] Josefina Narayanan LPN  
  
  
  
                                        Comment on above:   Take 5 mg by mouth a  
s needed. May repeat in 2 hours if needed   
  
  
  
Completed/Discontinued Medications  
  
  
  
                      Medication Drug Class(es) Dates      Sig (Normalized) Sig   
(Original)  
   
                                                    acetaminophen 325   
mg / HYDROcodone   
bitartrate 5 mg   
oral tablet  
(10 sources)              Opioid Agonist            Start:   
2017  
End:   
2021                              take 1 tablet by   
mouth every six   
hours as needed                         Hydrocodone-Acetam  
inophen   
Discontinued 1 - 2   
TABLET PO EVERY 6   
HOURS AS NEEDED 60   
May 9th, 2017   
12:00am 2021 12:39pm  
   
                                                    amoxicillin 500 mg   
oral tablet  
(13 sources)                            Penicillin-class   
Antibacterial                           Start:   
2023  
End:   
2023                              take 1000 mg by   
mouth twice daily                       Amoxicillin   
Discontinued 1000   
MG PO TWICE A DAY   
 1:00am 2023 3:46pm  
  
  
  
                                                    Start: 2022  
End: 2022                         take 1000 mg by mouth twice   
daily                                   Amoxicillin Discontinued 1000 MG PO   
TWICE A DAY    
1:00am 2022 1:04am  
  
  
  
                                                    amoxicillin 875   
mg / clavulanate   
125 mg oral   
tablet  
(9 sources)                             Penicillin-class   
Antibacterial                           Start:   
2022  
End: 2022                         take 1   
tablet by   
mouth twice   
daily                                   Amoxicillin-Pot   
Clavulanate   
Discontinued 1   
TABLET PO TWICE A   
DAY 10 5 2022 1:00am   
2022   
1:03am  
   
                                                    ascorbic acid 500   
mg oral tablet  
(13 sources)    Vitamin C                                       ascorbic acid,   
vitamin C, (VITAMIN   
C) 500 mg tablet   
Take 600 mg by mouth   
three times daily. 0   
Active  
   
                                        Comment on above:   Take 600 mg by mouth  
 three times daily.   
   
                                                    Ashwagandha Root   
Extract  
(9 sources)                                         Start:   
2022  
End: 2024                         take 300 mg   
by mouth   
once daily                              Ashwagandha Root   
Extract Discontinued   
300 MG PO DAILY   
2022   
1:00am 2024 4:14pm  
  
  
  
                                                    Start: 2022  
End: 2024                         take 300 mg by mouth once   
daily                                   Ashwagandha Root Extract Discontinued   
300 MG PO DAILY 2022   
12:00am 2024 3:14pm  
   
                                        Start: 2022   take 300 mg by mouth  
 once   
daily                                   Ashwagandha Root Extract Active 300   
MG PO DAILY 2022   
1:00am  
   
                                        Start: 2022   take 300 mg by mouth  
 once   
daily                                   Ashwagandha Root Extract Active 300   
MG PO DAILY 2022   
12:00am  
   
                                Start: 2022                 Ashwagandha Ro  
ot Extract Active MG PO   
2022 12:00am  
  
  
  
                                                    ASHWAGANDHA ROOT   
EXTRACT ORAL  
(13 sources)                                                    ASHWAGANDHA ROOT  
   
EXTRACT ORAL  
   
                                                    calcium ascorbate   
500 mg oral tablet  
(9 sources)                                         Start:   
  
End:   
                                       take 500 mg by   
mouth once   
daily                                   Ascorbate Calcium   
(Vitamin C)   
Discontinued 500 MG   
PO DAILY 2022 1:00am   
2024   
4:15pm  
   
                                                    cephalexin 500 mg   
oral tablet  
(11 sources)                            Cephalosporin   
Antibacterial                           Start:   
  
5                                       take 1 tablet   
by mouth three   
times daily                             KEFLEX 500 MG CAPS   
One tablet by mouth   
three times daily   
   
CEPHALEXIN   
[id number] Crow Godfrey MD  
  
  
  
                                        Start: 2015   take 1 tablet by bert  
 three   
times daily                             KEFLEX 500 MG CAPS One tablet by   
mouth three times daily    
CEPHALEXIN [id number] Crow Godfrey MD  
  
  
  
                                                    desogestrel /   
ethinyl estradiol  
(20 sources)                            Progestin,   
Estrogen                                Start:   
2012                              take 1   
tablet by   
mouth once   
daily                                   MIRCETTE TABS One tablet   
by mouth daily   
   
DESOGESTREL-ETHINYL   
ESTRADIOL TABS   
[id number] Iris Goncalves  
  
  
  
                                                    Start: 2012  
End: 2015                         take 1 tablet by mouth once   
daily                                   MIRCETTE TABS One tablet by mouth daily   
 DESOGESTREL-ETHINYL ESTRADIOL   
TABS [id number] Josefina AAMIR Dcon LPN  
  
  
  
                                                    fish oil  
(20 sources)                            Start: 2012   take 1 tablet by bert  
th   
once daily                              FISH OIL CAPS One tablet by   
mouth daily    
OMEGA-3 FATTY ACIDS CAPS   
[id number] Iris Goncalves  
  
  
  
                                                    Start: 2012  
End: 2015                         take 1 tablet by mouth once   
daily                                   FISH OIL CAPS One tablet by mouth   
daily  OMEGA-3 FATTY ACIDS   
CAPS [id number] Josefina AAMIR Narayanan LPN  
  
  
  
                                                    magnesium oxide 400 mg   
oral capsule  
(20 sources)                                        Start: 2022  
End: 2024                         take 400 mg by   
mouth once daily                        Magnesium Oxide   
Discontinued 400 MG PO   
DAILY 2022   
1:00am 2024 4:15pm  
  
  
  
                                                                Magnesium Oxide   
250 mg magnesium tab Take 240 mg by mouth. 0 Active  
  
  
  
                                        Comment on above:   Take 240 mg by mouth  
.   
   
                                                    mecobalamin  
(13 sources)                                                    mecobalamin (B12  
   
ACTIVE ORAL)  
   
                                                    MEDICATION,   
NON-DATABASE  
(13 sources)                                                    MEDICATION,   
NON-DATABASE once each   
week. Allergy   
Injections 0 Active  
   
                                        Comment on above:   once each week. Javad  
rgy Injections   
   
                                                    MULTIPLE VITAMIN  
(10 sources)                                        Start:   
20  
12                                      take 1 tablet   
by mouth once   
daily                                   MULTIVITAMINS TABS One   
tablet by mouth daily   
 MULTIPLE   
VITAMIN [id number]   
Iris Goncalves  
   
                                                    MULTIPLE VITAMIN  
(1 source)                                          Start:   
20  
12                                      take 1 tablet   
by mouth once   
daily                                   MULTIVITAMINS TABS One   
tablet by mouth daily   
 MULTIPLE   
VITAMIN [id number]   
Iris Goncalves  
   
                                                    mupirocin 0.02 mg/mg   
topical ointment  
(10 sources)                            RNA Synthetase   
Inhibitor   
Antibacterial                           Start:   
20  
18  
End:   
20  
24                                                  Mupirocin Discontinued   
1 APPLIC TOPICAL   
.QDAILY 2 2018 12:00am 2024 4:15pm  
   
                                                    Ubidecar/Fish   
Oil/Omega-3/Bryon   
(OMEGA-3-DHA-EPA-FIS  
H OIL-COQ10 ORAL)  
(13 sources)                                                    Ubidecar/Fish   
Oil/Omega-3/Bryon   
(OMEGA-3-DHA-EPA-FISH   
OIL-COQ10 ORAL) Take   
by mouth. 0 Active  
   
                                        Comment on above:   Take by mouth.   
   
                                                    Vitamin B Complex  
(13 sources)                                                    vitamin B comple  
x (B   
COMPLEX 1 ORAL)  
   
                                                    vitamin b12 0.5 mg   
oral tablet  
(10 sources)              Vitamin B12               Start:   
20  
17  
End:   
20  
24                                                  Cyanocobalamin   
(Vitamin B-12)   
(Vitamin B-12) 500 MCG   
tablet Discontinued   
800 MCG PO DAILY May   
5th, 2017 12:00am   
2024   
4:15pm  
   
                                                    zinc carnosine   
(ZINLORI) 75 mg   
tablet  
(13 sources)                                                    zinc carnosine   
(ZINLORI) 75 mg tablet  
   
                                                    zinc gluconate 30 mg   
oral tablet  
(9 sources)                                         Start:   
20  
End:   
20  
24                                      take 30 mg by   
mouth once   
daily                                   Zinc Gluconate   
Discontinued 30 MG PO   
DAILY 2022 1:00am 2024 4:15pm  
   
                                                    Zinc Sulfate  
(13 sources)                                                take 16 mg by   
mouth three   
times daily                             zinc sulfate (ZINC-15   
ORAL) Take 16 mg by   
mouth three times   
daily. 0 Active  
   
                                        Comment on above:   Take 16 mg by mouth   
three times daily.   
   
                                                    ZOLMitriptan  
(13 sources)                            Serotonin-1b and   
Serotonin-1d Receptor   
Agonist                                                     zolmitriptan (ZOMIG   
ORAL) Take by mouth. 0   
Active  
   
                                        Comment on above:   Take by mouth.   
  
  
  
Problems  
Active Problems  
  
  
                      Problem Classification Problem    Date       Documented Da  
te Episodic/Chronic  
   
                                                    Abdominal pain  
(17 sources)                            Abdominal pain;   
Translations:   
[Unspecified   
abdominal pain]                         Onset:   
2013                Episodic  
   
                                                    Other circulatory   
disease  
(13 sources)                            Acrocyanosis;   
Translations:   
[Other specified   
peripheral vascular   
diseases]                               Onset:   
2010                Chronic  
   
                                                    Other circulatory   
disease  
(13 sources)                            Raynaud's disease;   
Translations:   
[Raynaud's syndrome   
without gangrene]                       Onset:   
2016                Chronic  
   
                                                    Other connective tissue   
disease  
(4 sources)                             Pain in right foot;   
Translations: [PAIN   
IN RIGHT FOOT]                          Onset:   
2022                                          Episodic  
   
                                                    Other female genital   
disorders  
(3 sources)                             Abnormal uterine   
bleeding;   
Translations:   
[Abnormal uterine   
and vaginal   
bleeding,   
unspecified]                                                Chronic  
   
                                                    Other female genital   
disorders  
(13 sources)                            Premenstrual   
tension syndrome;   
Translations:   
[Premenstrual   
tension syndrome]                       Onset:   
2016                Chronic  
   
                                                    Other gastrointestinal   
disorders  
(13 sources)                            Irritable bowel   
syndrome with   
diarrhea;   
Translations:   
[Irritable bowel   
syndrome with   
diarrhea]                               Onset:   
2016                Chronic  
   
                                                    Other non-traumatic   
joint disorders  
(1 source)                              Pain in right ankle   
and joints of right   
foot; Translations:   
[PAIN IN RIGHT   
ANKLE]                                  Onset:   
2022                                          Episodic  
   
                                                    Other nutritional;   
endocrine; and   
metabolic disorders  
(13 sources)                            Intolerance to   
lactose;   
Translations:   
[Lactose   
intolerance,   
unspecified]                            2013          Chronic  
   
                                                    Other nutritional;   
endocrine; and   
metabolic disorders  
(13 sources)                            Iron overload;   
Translations:   
[Other disorders of   
iron metabolism]                        Onset:   
2016                Chronic  
   
                                                    Other nutritional;   
endocrine; and   
metabolic disorders  
(1 source)                              Abnormal weight   
gain; Translations:   
[Abnormal weight   
gain]                                   Onset:   
2024                                          Episodic  
   
                                                    Other screening for   
suspected conditions   
(not mental disorders   
or infectious disease)  
(20 sources)                            Cancer cervix   
screening status;   
Translations:   
[Encounter for   
screening for   
malignant neoplasm   
of cervix]                              Onset:   
10-                                          Episodic  
   
                                                    Other upper respiratory   
disease  
(13 sources)                            Allergic   
disposition;   
Translations:   
[Other allergic   
rhinitis]                               Onset:   
2016                Chronic  
   
                                                    Unclassified  
(7 sources)                             Aftercare ;   
Translations:   
[Encounter for   
other orthopedic   
aftercare]                              Onset:   
2017                  
   
                                                    Viral infection  
(9 sources)                             Enteroviral   
vesicular   
stomatitis with   
exanthem;   
Translations:   
[Enteroviral   
vesicular   
stomatitis with   
exanthem]                               2022          Episodic  
  
  
Past or Other Problems  
  
  
                                                    Problem   
Classification      Problem             Date                Documented   
Date                                    Episodic/Chronic  
   
                                                    Abdominal hernia  
(8 sources)                             Left inguinal hernia ;   
Translations: [Unilateral   
inguinal hernia, without   
obstruction or gangrene, not   
specified as recurrent]                 Onset:   
  
024                       2024                Episodic  
   
                                                    Immunizations and   
screening for   
infectious disease  
(20 sources)                            Patient encounter status;   
Translations: [Encounter for   
screening for human   
papillomavirus (HPV)]                                         Episodic  
   
                                                    Joint disorders and   
dislocations;   
trauma-related  
(20 sources)                            Acute meniscal tear, medial;   
Translations: [Other tear of   
medial meniscus, current   
injury, left knee, initial   
encounter]                              Onset:   
  
017                       2017                Episodic  
   
                                                    Malaise and fatigue  
(13 sources)                            Fatigue; Translations:   
[Other fatigue]                         Onset:   
  
016                       2016                Episodic  
   
                                                    Neoplasms of   
unspecified nature   
or uncertain   
behavior  
(20 sources)                            Neoplasm of unspecified   
behavior of bone, soft   
tissue, and skin;   
Translations: [Neoplasm of   
unspecified behavior of   
bone, soft tissue, and skin]            Onset:   
  
015                       2015                Episodic  
   
                                                    Nutritional   
deficiencies  
(20 sources)                            Folic acid deficiency;   
Translations: [Deficiency of   
other specified B group   
vitamins]                               Onset:   
  
016                       2016                Episodic  
   
                                                    Other aftercare  
(20 sources)                            Encounter for other   
specified surgical   
aftercare; Translations:   
[Other tear of medial   
meniscus, current injury,   
left knee, subsequent   
encounter]                              Onset:   
  
015                       2015                Episodic  
   
                                                    Other connective   
tissue disease  
(20 sources)                            Pain in calf; Translations:   
[Patellar tendonitis]                   Onset:   
  
017                       2017                Episodic  
   
                                                    Other connective   
tissue disease  
(1 source)                              Patellar tendonitis;   
Translations: [Patellar   
tendinitis, unspecified   
knee]                                   2014          Episodic  
   
                                                    Other connective   
tissue disease  
(1 source)                              Iliotibial band friction   
syndrome; Translations:   
[Iliotibial band syndrome,   
left leg]                               2012          Episodic  
   
                                                    Other   
gastrointestinal   
disorders  
(13 sources)                            Abdominal bloating;   
Translations: [Abdominal   
distension (gaseous)]                   Onset:   
  
016                       2016                Episodic  
   
                                                    Other   
gastrointestinal   
disorders  
(13 sources)                            Small bowel bacterial   
overgrowth syndrome;   
Translations: [Other   
specified diseases of   
intestine]                              Onset:   
  
016                       2016                Episodic  
   
                                                    Other non-traumatic   
joint disorders  
(20 sources)                            Pain in left knee;   
Translations:   
[Patellofemoral stress   
syndrome]                               Onset:   
  
017                       2017                Episodic  
   
                                                    Other non-traumatic   
joint disorders  
(1 source)                              Effusion, left knee;   
Translations: [Effusion,   
left knee]                              Onset:   
  
017                       2017                Episodic  
   
                                                    Other skin disorders  
(13 sources)                            Skin lesion; Translations:   
[Disorder of the skin and   
subcutaneous tissue,   
unspecified]                            Onset:   
  
014                       2014                Episodic  
   
                                                    Residual codes;   
unclassified  
(13 sources)                            Heterozygous   
methylenetetrahydrofolate   
reductase mutation;   
Translations: [Genetic   
susceptibility to other   
disease]                                Onset:   
  
016                       2016                Episodic  
   
                                                    Skin and   
subcutaneous tissue   
infections  
(20 sources)                            Pilonidal abscess of    
cleft; Translations:   
[Pilonidal cyst with   
abscess]                                Onset:   
  
024                                                 Episodic  
  
  
  
Results  
  
  
                          Test Name    Value        Interpretation Reference   
Range                                   Facility  
   
                                                    Insulin Levelon 2024   
   
                      INSULIN,FASTING 12.4 uIU/mL Normal     2.6-24.9   [facility]  
   
                                        Comment on above:   Result Comment: Perf  
ormed at: [facility] [place] [id number]  
: Jerry Bobby PhD, Phone: [phone]   
   
                                                            Performed By: #### L  
[id number] ####  
[facility]  
[address] [place], [address] [phone]   
   
                                                    CBC-Complete Blood Cnt No Di  
ffon 2024   
   
                                                    Erythrocyte   
distribution width   
(RBC) [Ratio]   12.3 %          Normal          11.6-14.6       [facility]  
   
                                        Comment on above:   Performed By: #### L  
500.4050, L503.6030, L500.4100,   
.3500,   
L503.0105, L506.0400, L503.6550, L501.9985, L506.1000, L506.0250,   
L100.0500, L501.2450, L501.9520 ####  
[facility]  
[address] [place], [address] [phone]   
   
                                                    Hematocrit (Bld)   
[Volume fraction] 42.7 %          Normal          37-47           [facility]  
   
                                        Comment on above:   Performed By: #### L  
500.4050, L503.6030, L500.4100,   
.3500,   
L503.0105, L506.0400, L503.6550, L501.9985, L506.1000, L506.0250,   
L100.0500, L501.2450, L501.9520 ####  
[facility]  
[address] [place], [address]  
[phone]   
   
                                                    Hemoglobin (Bld)   
[Mass/Vol]      14.6 g/dL       Normal          12.0-15.0       [facility]  
   
                                        Comment on above:   Performed By: #### L  
500.4050, L503.6030, L500.4100,   
.3500,   
L503.0105, L506.0400, L503.6550, L501.9985, L506.1000, L506.0250,   
L100.0500, L501.2450, L501.9520 ####  
[facility]  
[address] [place], [address]  
[phone]   
   
                                                    MCH (RBC) [Entitic   
mass]           31.1 pg         Normal          27.0-32.0       [facility]  
   
                                        Comment on above:   Performed By: #### L  
500.4050, L503.6030, L500.4100,   
.3500,   
L503.0105, L506.0400, L503.6550, L501.9985, L506.1000, L506.0250,   
L100.0500, L501.2450, L501.9520 ####  
[facility]  
[address] [place], [address]  
[phone]   
   
                                                    MCHC (RBC)   
[Mass/Vol]      34.2 g/dL       Normal          32-36           [facility]  
   
                                        Comment on above:   Performed By: #### L  
500.4050, L503.6030, L500.4100,   
.3500,   
L503.0105, L506.0400, L503.6550, L501.9985, L506.1000, L506.0250,   
L100.0500, L501.2450, L501.9520 ####  
[facility]  
[address] [place], [address]  
[phone]   
   
                                                    MCV (RBC) [Entitic   
vol]            91.0 fL         Normal          81-99           [facility]  
   
                                        Comment on above:   Performed By: #### L  
500.4050, L503.6030, L500.4100,   
.3500,   
L503.0105, L506.0400, L503.6550, L501.9985, L506.1000, L506.0250,   
L100.0500, L501.2450, L501.9520 ####  
[facility]  
[address] [place], [address]  
[phone]   
   
                                                    Platelet mean   
volume (Bld)   
[Entitic vol]   9.5 fL          Normal          6.2-12.0        [facility]  
   
                                        Comment on above:   Performed By: #### L  
500.4050, L503.6030, L500.4100,   
.3500,   
L503.0105, L506.0400, L503.6550, L501.9985, L506.1000, L506.0250,   
L100.0500, L501.2450, L501.9520 ####  
[facility]  
[address] [place], [address]  
[phone]   
   
                                                    Platelets (Bld)   
[#/Vol]         271 10*3/uL     Normal          150-450         [facility]  
   
                                        Comment on above:   Performed By: #### L  
500.4050, L503.6030, L500.4100,   
.3500,   
L503.0105, L506.0400, L503.6550, L501.9985, L506.1000, L506.0250,   
L100.0500, L501.2450, L501.9520 ####  
[facility]  
[address] [place], [address]  
[phone]   
   
                      RBC (Bld) [#/Vol] 4.69 10*6/uL Normal     4.2-5.4    [facility]  
   
                                        Comment on above:   Performed By: #### L  
500.4050, L503.6030, L500.4100,   
.3500,   
L503.0105, L506.0400, L503.6550, L501.9985, L506.1000, L506.0250,   
L100.0500, L501.2450, L501.9520 ####  
[facility]  
[address] [place], [place], [id number] [phone]   
   
                      RDW SD     40.4 fl    Normal     35.1-43.9  [facility]  
   
                                        Comment on above:   Performed By: #### L  
500.4050, L503.6030, L500.4100,   
.3500,   
L503.0105, L506.0400, L503.6550, L501.9985, L506.1000, L506.0250,   
L100.0500, L501.2450, L501.9520 ####  
[facility]  
[address] [place], [place], [address] [phone]   
   
                      WBC (Bld) [#/Vol] 6.7 10*3/uL Normal     4.4-11.0   [facility]  
   
                                        Comment on above:   Performed By: #### L  
500.4050, L503.6030, L500.4100,   
.3500,   
L503.0105, L506.0400, L503.6550, L501.9985, L506.1000, L506.0250,   
L100.0500, L501.2450, L501.9520 ####  
[facility]  
[address] [place], [place], [address] [phone]   
   
                                                    Comprehensive Metabolic Prof  
[facility] 2024   
   
                      Albumin [Mass/Vol] 3.7 g/dL   Normal     3.2-5.0    [facility]  
   
                                        Comment on above:   Order Comment: UNK  
N   
   
                                                            Performed By: #### L  
500.4050, L503.6030, L500.4100, .3500,   
L503.0105, L506.0400, L503.6550, L501.9985, L506.1000, L506.0250,   
L100.0500, L501.2450, L501.9520 ####  
[facility]  
[address] [place], [place], [id number] [phone]   
   
                                                    Albumin/Globulin   
[Mass ratio]    1.1 {ratio}     Normal          0.9-2.4         [facility]  
   
                                        Comment on above:   Order Comment: UNK  
N   
   
                                                            Performed By: #### L  
500.4050, L503.6030, L500.4100, .3500,   
L503.0105, L506.0400, L503.6550, L501.9985, L506.1000, L506.0250,   
L100.0500, L501.2450, L501.9520 ####  
[facility]  
[address] [place], [address]  
[phone]   
   
                      ALK P      127 U/L    High            [facility]  
   
                                        Comment on above:   Order Comment: UNK  
N   
   
                                                            Performed By: #### L  
500.4050, L503.6030, L500.4100, .3500,   
L503.0105, L506.0400, L503.6550, L501.9985, L506.1000, L506.0250,   
L100.0500, L501.2450, L501.9520 ####  
[facility]  
[address] [place], [place], [address] [phone]   
   
                                                    ALT [Catalytic   
activity/Vol]   37 U/L          Normal          13-56           [facility]  
   
                                        Comment on above:   Order Comment: UNK  
N   
   
                                                            Performed By: #### L  
500.4050, L503.6030, L500.4100, .3500,   
L503.0105, L506.0400, L503.6550, L501.9985, L506.1000, L506.0250,   
L100.0500, L501.2450, L501.9520 ####  
[facility]  
[address] [place], [address]  
[phone]   
   
                                                    AST [Catalytic   
activity/Vol]   15 U/L          Normal          15-37           [facility]  
   
                                        Comment on above:   Order Comment: UNK  
N   
   
                                                            Performed By: #### L  
500.4050, L503.6030, L500.4100, .3500,   
L503.0105, L506.0400, L503.6550, L501.9985, L506.1000, L506.0250,   
L100.0500, L501.2450, L501.9520 ####  
[facility]  
[address] [place], [place], [address] [phone]   
   
                                                    Bilirubin   
[Mass/Vol]      0.40 mg/dL      Normal          0.20-1.00       [facility]  
   
                                        Comment on above:   Order Comment: UNK  
N   
   
                                                            Result Comment: For   
patients on eltrombopag therapy, use of  
Dimension [place] TBIL is not recommended.   
   
                                                            Performed By: #### L  
500.4050, L503.6030, L500.4100, .3500,   
L503.0105, L506.0400, L503.6550, L501.9985, L506.1000, L506.0250,   
L100.0500, L501.2450, L501.9520 ####  
[facility]  
[address] [place], [address] [phone]   
   
                      BUN/CRE    22.3 RATIO High       10-20      [facility]  
   
                                        Comment on above:   Order Comment: UNK  
N   
   
                                                            Performed By: #### L  
500.4050, L503.6030, L500.4100, .3500,   
L503.0105, L506.0400, L503.6550, L501.9985, L506.1000, L506.0250,   
L100.0500, L501.2450, L501.9520 ####  
[facility]  
[address] [place], [place], [address] [phone]   
   
                      CA,Total   8.9 mg/dL  Normal     8.5-10.1   [facility]  
   
                                        Comment on above:   Order Comment: UNK  
N   
   
                                                            Performed By: #### L  
500.4050, L503.6030, L500.4100, .3500,   
L503.0105, L506.0400, L503.6550, L501.9985, L506.1000, L506.0250,   
L100.0500, L501.2450, L501.9520 ####  
[facility]  
[address] [place], [address]  
[phone]   
   
                                                    Chloride   
[Moles/Vol]     102 mmol/L      Normal                    [facility]  
   
                                        Comment on above:   Order Comment: UNK  
N   
   
                                                            Performed By: #### L  
500.4050, L503.6030, L500.4100, .3500,   
L503.0105, L506.0400, L503.6550, L501.9985, L506.1000, L506.0250,   
L100.0500, L501.2450, L501.9520 ####  
[facility]  
[address] [place], [address]  
[phone]   
   
                      CO2 [Moles/Vol] 26.0 mmol/L Normal     21.0-32.0  [facility]  
   
                                        Comment on above:   Order Comment: UNK  
N   
   
                                                            Performed By: #### L  
500.4050, L503.6030, L500.4100, .3500,   
L503.0105, L506.0400, L503.6550, L501.9985, L506.1000, L506.0250,   
L100.0500, L501.2450, L501.9520 ####  
[facility]  
[address] [place] [place], [address]  
[phone]   
   
                                                    Creatinine   
[Mass/Vol]      0.81 mg/dL      Normal          0.55-1.02       [facility]  
   
                                        Comment on above:   Order Comment: UNK  
N   
   
                                                            Result Comment: The   
validity of the calculated GFR GFRAA in   
patients over  
70 years has not been determined. Clinical correlation is  
essential.   
   
                                                            Performed By: #### L  
500.4050, L503.6030, L500.4100, .3500,   
L503.0105, L506.0400, L503.6550, L501.9985, L506.1000, L506.0250,   
L100.0500, L501.2450, L501.9520 ####  
[facility]  
[address] [place] [place], [address]  
[phone]   
   
                      EST GFR - AA 98 mL/min  Normal     >60        [facility]  
   
                                        Comment on above:   Order Comment: UNK  
N   
   
                                                            Result Comment: Afri  
can American GFR Calc   
   
                                                            Performed By: #### L  
500.4050, L503.6030, L500.4100, .3500,   
L503.0105, L506.0400, L503.6550, L501.9985, L506.1000, L506.0250,   
L100.0500, L501.2450, L501.9520 ####  
[facility]  
[address] [place], [place], [address] [phone]   
   
                      GAP        7          Normal     5-15       [facility]  
   
                                        Comment on above:   Order Comment: UNK  
N   
   
                                                            Performed By: #### L  
500.4050, L503.6030, L500.4100, .3500,   
L503.0105, L506.0400, L503.6550, L501.9985, L506.1000, L506.0250,   
L100.0500, L501.2450, L501.9520 ####  
[facility]  
[address] [place], [address] [phone]   
   
                                                    GFR/1.73 sq   
M.predicted among   
non-blacks MDRD   
(S/P/Bld) [Vol   
rate/Area]      81 mL/min/{1.73_m2} Normal          >60             [facility]  
   
                                        Comment on above:   Order Comment: UNK  
N   
   
                                                            Result Comment: Non-  
 GFR Calc   
   
                                                            Performed By: #### L  
500.4050, L503.6030, L500.4100, .3500,   
L503.0105, L506.0400, L503.6550, L501.9985, L506.1000, L506.0250,   
L100.0500, L501.2450, L501.9520 ####  
[facility]  
[address] [place], [place], [address] [phone]   
   
                                                    Globulin (S)   
[Mass/Vol]      3.5 g/dL        Normal          2.2-4.2         [facility]  
   
                                        Comment on above:   Order Comment: UNK  
N   
   
                                                            Performed By: #### L  
500.4050, L503.6030, L500.4100, .3500,   
L503.0105, L506.0400, L503.6550, L501.9985, L506.1000, L506.0250,   
L100.0500, L501.2450, L501.9520 ####  
[facility]  
[address] [place], [address]  
[phone]   
   
                      Glucose [Mass/Vol] 92 mg/dL   Normal          [facility]  
   
                                        Comment on above:   Order Comment: UNK  
N   
   
                                                            Performed By: #### L  
500.4050, L503.6030, L500.4100, .3500,   
L503.0105, L506.0400, L503.6550, L501.9985, L506.1000, L506.0250,   
L100.0500, L501.2450, L501.9520 ####  
[facility]  
[address] [place], [address]  
[phone]   
   
                                                    Potassium   
[Moles/Vol]     4.5 mmol/L      Normal          3.5-5.1         [facility]  
   
                                        Comment on above:   Order Comment: UNK  
N   
   
                                                            Performed By: #### L  
500.4050, L503.6030, L500.4100, .3500,   
L503.0105, L506.0400, L503.6550, L501.9985, L506.1000, L506.0250,   
L100.0500, L501.2450, L501.9520 ####  
[facility]  
[address] [place], [address]  
[phone]   
   
                      Sodium [Moles/Vol] 135 mmol/L Low        136-145    [facility]  
   
                                        Comment on above:   Order Comment: UNK  
N   
   
                                                            Performed By: #### L  
500.4050, L503.6030, L500.4100, .3500,   
L503.0105, L506.0400, L503.6550, L501.9985, L506.1000, L506.0250,   
L100.0500, L501.2450, L501.9520 ####  
[facility]  
[address] [place], [place], [address] [phone]   
   
                      T PROT     7.2 g/dL   Normal     6.4-8.2    [facility]  
   
                                        Comment on above:   Order Comment: UNK  
N   
   
                                                            Performed By: #### L  
500.4050, L503.6030, L500.4100, .3500,   
L503.0105, L506.0400, L503.6550, L501.9985, L506.1000, L506.0250,   
L100.0500, L501.2450, L501.9520 ####  
[facility]  
[address] [place], [place], [address] [phone]   
   
                                                    Urea nitrogen   
[Mass/Vol]      18 mg/dL        Normal          7-18            [facility]  
   
                                        Comment on above:   Order Comment: UNK  
N   
   
                                                            Performed By: #### L  
500.4050, L503.6030, L500.4100, .3500,   
L503.0105, L506.0400, L503.6550, L501.9985, L506.1000, L506.0250,   
L100.0500, L501.2450, L501.9520 ####  
[facility]  
[address] [place], [place], [address] [phone]   
   
                                                    Ferritinon 2024   
   
                                                    Ferritin   
[Mass/Vol]      51 ng/mL        Normal          8-252           [facility]  
   
                                        Comment on above:   Order Comment: UNK  
N   
   
                                                            Performed By: #### L  
500.4050, L503.6030, L500.4100, .3500,   
L503.0105, L506.0400, L503.6550, L501.9985, L506.1000, L506.0250,   
L100.0500, L501.2450, L501.9520 ####  
[facility]  
[address] [place], [place], [address] [phone]   
   
                                                    Folates, (Folic Acid)on    
   
                      FOLATES    7.30 ng/mL Normal     3.1-55.4   [facility]  
   
                                        Comment on above:   Order Comment: UNK  
N   
   
                                                            Performed By: #### L  
500.4050, L503.6030, L500.4100, .3500,   
L503.0105, L506.0400, L503.6550, L501.9985, L506.1000, L506.0250,   
L100.0500, L501.2450, L501.9520 ####  
[facility]  
[address] [place], [address]  
[phone]   
   
                                                    Hemoglobin A1con 2024   
   
                                                    HbA1c (Bld) [Mass   
fraction]       5.7 %           High            3.8-5.6         [facility]  
   
                                        Comment on above:   Result Comment: Norm  
al < 5.7 %  
Prediabetic 5.7 - 6.4 %  
Diabetic >or= 6.5 %  
Please note range changes.   
   
                                                            Performed By: #### L  
3300.3500 ####  
[facility]  
[address] [place], [place], [address] [phone]   
   
                                                    Iron+Iron Binding Capacityon  
 2024   
   
                      Iron [Mass/Vol] 125 ug/dL  Normal          [facility]  
   
                                        Comment on above:   Order Comment: UNK  
N   
   
                                                            Performed By: #### L  
500.4050, L503.6030, L500.4100, .3500,   
L503.0105, L506.0400, L503.6550, L501.9985, L506.1000, L506.0250,   
L100.0500, L501.2450, L501.9520 ####  
[facility]  
[address] [place], [address]  
[phone]   
   
                      IRON SATURATION 32.8       Normal     15.0-55.0  [facility]  
   
                                        Comment on above:   Order Comment: UNK  
N   
   
                                                            Performed By: #### L  
500.4050, L503.6030, L500.4100, .3500,   
L503.0105, L506.0400, L503.6550, L501.9985, L506.1000, L506.0250,   
L100.0500, L501.2450, L501.9520 ####  
[facility]  
[address] [place], [place], [address] [phone]   
   
                      TIBC       381 ug/dL  Normal     250-450    [facility]  
   
                                        Comment on above:   Order Comment: UNK  
N   
   
                                                            Performed By: #### L  
500.4050, L503.6030, L500.4100, .3500,   
L503.0105, L506.0400, L503.6550, L501.9985, L506.1000, L506.0250,   
L100.0500, L501.2450, L501.9520 ####  
[facility]  
[address] [place], [address] [phone]   
   
                                                    Lipaseon 2024   
   
                                                    Lipase [Catalytic   
activity/Vol]   24 U/L          Normal          13-75           [facility]  
   
                                        Comment on above:   Order Comment: UNK  
N   
   
                                                            Result Comment: Harrison kahn note:  
LIPASE revised reference range effective 23.  
New Lipase methodology. Expected to produce lower values  
than the previous assay method.  
NEW Reference Range: 13 - 75 U/L   
   
                                                            Performed By: #### L  
500.4050, L503.6030, L500.4100, .3500,   
L503.0105, L506.0400, L503.6550, L501.9985, L506.1000, L506.0250,   
L100.0500, L501.2450, L501.9520 ####  
[facility]  
[address] [place], [place], [address] [phone]   
   
                                                    Lipid Profileon 2024   
   
                                                    Cholesterol   
[Mass/Vol]      245 mg/dL       High            200             [facility]  
   
                                        Comment on above:   Order Comment: UNK  
N   
   
                                                            Result Comment: <200  
 mg/dL Desirable  
200-240 mg/dL Borderline  
>240 mg/dL High Risk   
   
                                                            Performed By: #### L  
500.4050, L503.6030, L500.4100, .3500,   
L503.0105, L506.0400, L503.6550, L501.9985, L506.1000, L506.0250,   
L100.0500, L501.2450, L501.9520 ####  
[facility]  
[address] [place], [address]  
[phone]   
   
                                                    Cholesterol in HDL   
[Mass/Vol]      60 mg/dL        Normal                          [facility]  
   
                                        Comment on above:   Order Comment: UNK  
N   
   
                                                            Result Comment: The   
drugs N-Acetylcysteine and Metamizole may   
falsely  
depress this assay.  
Reference Range  
HDL <40 mg/dL Low HDL Cholesterol  
HDL >or= 60 mg/dL High HDL Cholesterol   
   
                                                            Performed By: #### L  
500.4050, L503.6030, L500.4100, .3500,   
L503.0105, L506.0400, L503.6550, L501.9985, L506.1000, L506.0250,   
L100.0500, L501.2450, L501.9520 ####  
[facility]  
[address] [place], [address]  
[phone]   
   
                                                    Cholesterol in LDL   
[Mass/Vol]      148 mg/dL       High            0-130           [facility]  
   
                                        Comment on above:   Order Comment: UNK  
N   
   
                                                            Performed By: #### L  
500.4050, L503.6030, L500.4100, .3500,   
L503.0105, L506.0400, L503.6550, L501.9985, L506.1000, L506.0250,   
L100.0500, L501.2450, L501.9520 ####  
[facility]  
[address] [place], [address]  
[phone]   
   
                                                    Cholesterol in   
VLDL [Mass/Vol] 37 mg/dL        Normal          5-40            [facility]  
   
                                        Comment on above:   Order Comment: UNK  
N   
   
                                                            Performed By: #### L  
500.4050, L503.6030, L500.4100, .3500,   
L503.0105, L506.0400, L503.6550, L501.9985, L506.1000, L506.0250,   
L100.0500, L501.2450, L501.9520 ####  
[facility]  
[address] [place], [address]  
[phone]   
   
                                                    Triglyceride   
[Mass/Vol]      184 mg/dL       Normal                          [facility]  
   
                                        Comment on above:   Order Comment: UNK  
N   
   
                                                            Result Comment: The   
drugs N-Acetylcysteine and Metamizole may   
falsely  
depress this assay.  
Serum Triglycerides Reference Interval  
Normal <150 mg/dL  
Borderline high 150 - 199 mg/dL  
High 200 - 499 mg/dL  
Very High > or = 500 mg/dL   
   
                                                            Performed By: #### L  
500.4050, L503.6030, L500.4100, .3500,   
L503.0105, L506.0400, L503.6550, L501.9985, L506.1000, L506.0250,   
L100.0500, L501.2450, L501.9520 ####  
[facility]  
[address] [place], [address]  
[phone]   
   
                                                    T4 Free Directon 2024   
   
                      T4 FREE DIRECT 0.82 ng/dL Normal     0.76-1.46  [facility]  
   
                                        Comment on above:   Order Comment: GURMEET MORENO ADD ON TO BLOOD IN LAB, THANKS   
   
                                                            Performed By: #### L  
3300.3500 ####  
[facility]  
[address] [place], [address]  
[phone]   
   
                                                    Thyroid Stim Hormone (TSH)on  
 2024   
   
                          TSH          2.020 uIU/mL Normal       0.358-3.74  
0                                       [facility]  
   
                                        Comment on above:   Order Comment: UNK  
N   
   
                                                            Performed By: #### L  
500.4050, L503.6030, L500.4100, .3500,   
L503.0105, L506.0400, L503.6550, L501.9985, L506.1000, L506.0250,   
L100.0500, L501.2450, L501.9520 ####  
[facility]  
[address] [place], [address]  
[phone]   
   
                                                    Vitamin B12on 2024   
   
                                                    Cobalamin (Vitamin   
B12) [Mass/Vol] 536 pg/mL       Normal          211-911         [facility]  
   
                                        Comment on above:   Performed By: #### L  
3300.3500 ####  
[facility]  
[address] [place], [place], [address] [phone]   
   
                                                    Vitamin D,25 Hydroxyon    
   
                      Vitamin D 25-OH 20.8 ng/mL Normal                [facility]  
   
                                        Comment on above:   Result Comment: Krystal  
min D 25(OH) Status Range  
Deficiency <20 ng/mL (50nmol/L)  
Insufficiency 20 - 30 ng/mL (50 - 75 nmol/L)  
Sufficiency 30 - 100 ng/mL (75 - 250 nmol/L)  
Toxicity >100 ng/mL (>250 nmol/L)   
   
                                                            Performed By: #### L  
3300.3500 ####  
[facility]  
[address] [place], [place], [address] [phone]   
   
                                                    CT CARDIAC SCORING WO IV CON  
TRASTon 2024   
   
                                                    CT CARDIAC SCORING   
WO IV CONTRAST                          Interpreted By: Jaron Banks,  
STUDY:  
CT CARDIAC SCORING WO IV   
CONTRAST; 2024 9:12 am  
  
INDICATION:  
Signs/Symptoms:SCREENING.  
  
COMPARISON:  
None.  
  
ACCESSION NUMBER(S):  
[id number]  
  
ORDERING CLINICIAN:  
JOSE SLOAN  
  
TECHNIQUE:  
Using prospective ECG   
gating, limited CT scan of   
the chest for  
evaluation of coronary   
arteries was performed   
without intravenous  
contrast. Coronary calcium   
scoring was performed   
according to the  
method of Agatston.  
  
FINDINGS:  
The score and distribution   
of calcium in the coronary   
arteries is as  
follows:  
  
LM: 0.  
LAD: 0.  
LCx: 0.  
RCA: 0.  
  
Total: 0.  
  
The visualized segments of   
the lungs are normally   
expanded. Mild  
patchy   
infiltrates/atelectasis   
posteromedial right lower   
lobe.  
  
The visualized mid/lower   
ascending thoracic aorta   
measures 3 cm in  
diameter.  
  
The heart is normal in size.   
No significant pericardial   
effusion is  
present.  
  
Mildly prominent nonspecific   
distal paraesophageal nodes   
up to 6 mm  
short axis.  
  
Small hiatal hernia. Some   
fluid in the distal   
esophagus may be  
sequela reflux. Visualized   
stomach distended with   
ingested contents.  
8 mm low-density left   
hepatic lobe lesion probably   
representing a  
cyst although incompletely   
characterized.  
  
IMPRESSION:  
1. Coronary artery calcium   
score of 0*.  
2. Mild patchy   
infiltrate/atelectasis   
posteromedial right lower   
lobe.  
3. Additional findings as   
above.  
  
*Coronary artery calcium   
scoring may be helpful in   
predicting the  
risk for future coronary   
heart disease events.   
According to the  
[employer]   
[employer]   
Clinical Expert Consensus  
Task Force, such testing   
provides important   
prognostic information in  
patients with more than one   
coronary heart disease risk   
factor. The  
coronary artery calcium   
score correlates with the   
annual risk of a  
non-fatal myocardial   
infarction or coronary heart   
disease death.  
  
Coronary artery score Annual   
Risk  
  
0-99 0.4%  
100-399 1.3%  
>400 2.4%  
  
These three  breakpoints    
correspond to lower,   
intermediate and high  
risk states for future   
coronary events. Such   
information should be  
used, along with appropriate   
clinical judgment, to make   
decisions  
regarding the intensity of   
risk factor management   
strategies to treat  
blood lipids and to modify   
other non-lipid coronary   
risk factors.  
  
Reference: Moses P et   
al. Circulation. 2007;   
115:402-426  
  
MACRO:  
None  
  
Signed by: Jaron Banks   
2024 6:32 PM  
Dictation workstation:   
[id number]        [facility]  
   
                                                    Surgery Visit Reporton    
   
                                                    Surgery Visit   
Report                                  [facility]  
[address] [place] [address]. [address]  
[place] [address]  
[phone]  
  
OFFICE VISIT  
Date of Service: 24  
  
MR#: [id number] Acct:   
[id number]  
  
Name: MAGGY CARPIO Rep   
#: [id number]  
8  
: 1975 Provider:   
Dr. Faye lindsey MD  
Age/Sex: 48/F Location:   
[facility]  
  
Status: Signed  
  
Intake  
Vital Signs  
  
  
24  
06:33 24  
07:38  
  
Height 5 ft 10 in 5 ft 10 in  
  
  
Intake  
Visit Reasons: check   
incision/red  
Chief Complaint: check   
inscision/red  
Is patient in pain?: No  
Allergies  
  
No Known Allergies Allergy   
(Verified 24 12:00)  
  
  
  
Medications  
  
  
  
???Medication   
???Instructions ???Recorded   
???Confirmed ???Type  
  
cholecalciferol (vitamin D3)   
125 125 mcg PO DAILY   
22 History  
  
mcg (5,000 unit) capsule  
  
magnesium glycinate 100 mg   
(as 100 mg PO DAILY 24 History  
  
glycinate) tablet  
  
acetaminophen 500 mg capsule   
500 mg PO Q6H PRN pain   
24 History  
  
  
  
  
PFSH  
Medical History (Reviewed   
24 @ 13:01 by Kate Potts)  
  
Wears glasses  
History of steroid therapy  
History of pain when walking  
Anxiety  
Migraine headache  
Heartburn  
History of IBS  
Non-smoker  
History of drainage of   
abscess ( 2022)  
Pilonidal abscess of    
cleft  
Hand, foot and mouth disease  
  
  
Surgical History (Reviewed   
24 @ 13:01 by Kate Potts)  
  
S/P inguinal hernia repair  
History of incision and   
drainage  
History of   
esophagogastroduodenoscopy   
(EGD)  
History of arthroscopy of   
left knee  
History of surgical removal   
of ganglion cyst  
History of ankle surgery  
History of umbilical hernia   
repair  
History of laparoscopic   
cholecystectomy  
History of colonoscopy  
  
  
Family History (Reviewed   
24 @ 13:01 by Kate Potts)  
Father Colon polyps  
Diabetes  
AAA (abdominal aortic   
aneurysm)  
Hypertension  
Mother Colon polyps  
Neuropathy  
  
  
Social History (Reviewed   
24 @ 13:01 by Kate Potts)  
Smoking Status: Never smoker  
  
  
  
HPI  
HPI  
HPI:  
48-year-old female status   
post bilateral laparoscopic   
Hernia pair with mesh May   
17,024.  
  
ROS  
General  
General: No weight change,   
appetite, fatigue, colon   
cancer, breast cancer or   
weakness  
HEENT  
HEENT: No difficulty   
swallowing, eye injury, eye   
surgery, swollen glands or   
hoarseness  
Endo  
Endocrine: No thyroid   
disease, diabetes mellitus,   
thyroid cancer, Hair loss,   
heat intolerance or  
cold intolerance  
Skin  
Skin: No rash or changing   
moles  
Breast  
Breast: No left breast lump,   
right breast lump, nipple   
discharge, breast pain,   
abnormal mammogram,  
abnormal US or breast   
enlargement  
Musc  
Musculoskeletal: No back   
problems, arthritis,   
rheumatoid arthritis, gout   
or joint pain  
Cardio  
Cardiovascular: No murmur,   
pacemaker, heart disease,   
atrial fibrillation, high   
blood pressure, heart  
attack, heart stent,   
palpitations, shortness of   
breat with exertion or chest   
pain  
Psych  
Psychiatric: No depression,   
anxiety or hearing voices  
Resp  
Respiratory: No shortness of   
breath, No sleep apnea, No   
cough, No COPD, No asthma,   
No emphysema and  
No wheezing  
Gastro  
Gastrointestinal: No   
abdominal pain, No nausea or   
vomiting, No diarrhea, No   
constipation, No blood  
in stool, No acid reflux, No   
hemorrhoids, No ulcers, No   
gallbladder problem and No   
black,tarry  
stools  
Hema  
Hematologic: No blood   
thinners, No blood   
disorders, No bleeding, No   
anemia and No blood clots  
Neuro  
Neurologic: No system   
reviewed and no additional   
complaints, except as   
documented, No as per HPI,   
No  
abnormal gait, No abnormal   
hearing, No abnormal   
movements, No abnormal   
speech, No behavioral  
changes, No burning   
sensations, No confusion, No   
convulsions, No   
disequilibrium, No   
dizziness, No  
localized weakness, No   
frequent falls, No   
headache(s), No lack of   
coordination, No loss of   
vision,  
No memory loss, No numbness,   
No other visual   
disturbances, No radicular   
pain, No restless legs, No  
sensory deficit, No syncope,   
No tingling, No tremor(s),   
No weakness and No other  
  
Exam  
GI  
Other:  
Right lateral transverse   
infraumbilical incision has   
a 3 x 2 mm area of erythema.  
  
Assessment and Plan  
Assessment and Plan  
(1) Suture reaction:  
Status: Acute  
Qualifiers:  
Encounter type: initial   
encounter Qualified Code(s):   
T81.89XA - Other   
complications of  
procedures, not elsewhere   
classified, initial   
encounter  
  
Plan  
I explored the right lateral   
aspect of her infraumbilical   
incision. Clearly a small   
suture abscess.  
Simply use forceps to help   
debride the area. Using   
silver nitrate. Applied a   
Band-Aid. I anti  
cipate spontaneous   
resolution. The patient's   
had an opportunity ask and   
have questions answered.  
Further office appointment   
can be as needed.  
  
Copy: Dr. Jose Valero M.D.,   
F.A.C.S.  
  
Coding  
Level of Care Code  
Global Post Op  
  
Diagnoses  
Suture reaction, initial   
encounter T81.89XA  
Encounter type: initial   
encounter  
  
  
(more content not   
included)...        Normal                                  [facility]  
   
                                                    Surgery Visit Reporton    
   
                                                    Surgery Visit   
Report                                  [facility]  
[address] [place] [address]. [address]  
[place] [address]  
[phone]  
  
OFFICE VISIT  
Date of Service: 24  
  
MR#: [id number] Acct:   
[id number]  
  
Name: MAGGY CARPIO Rep   
#: [id number]  
2  
: 1975 Provider:   
MARIYA bueno  
Age/Sex: 48/F Location:   
[facility]  
  
Status: Signed  
  
Intake  
Vital Signs  
  
  
24  
15:34 24  
06:33  
  
Height 5 ft 10 in 5 ft 10 in  
  
  
Intake  
Visit Reasons: HERNIA DOS   
  
Chief Complaint: hernia f/u  
 Required: No  
Is patient in pain?: No  
Allergies  
  
No Known Allergies Allergy   
(Verified 24 13:00)  
  
  
  
Medications  
  
  
  
???Medication   
???Instructions ???Recorded   
???Confirmed ???Type  
  
cholecalciferol (vitamin D3)   
125 125 mcg PO DAILY   
22 History  
  
mcg (5,000 unit) capsule  
  
magnesium glycinate 100 mg   
tablet 100 mg PO DAILY   
24 History  
  
acetaminophen 500 mg capsule   
500 mg PO Q6H PRN pain   
24 History  
  
  
  
  
Subjective  
Details:  
Patient is a 47 y/o F I am   
following s/p laparoscopic   
bilateral inguinal hernia   
repairs with mesh by  
Dr. Valero on 24.   
Patient tolerated the   
procedure well. Patient   
notes intermittent sharp   
pain in  
the right groin which lasts   
1-2 seconds and then   
resolves. She notes   
otherwise her recovery has   
been  
well. She denies having   
nausea, vomiting, fever   
since the procedure. She   
notes appetite and bowel  
habits have returned to her   
normal.  
  
Objective  
Details:  
Abdomen- incisions c/d/i. No   
erythema or infection noted.   
No recurrent hernia noted.  
  
Coding  
Level of Care Code  
Global Post Op  
  
Diagnoses  
S/P bilateral inguinal   
hernia repair Z98.890;   
Z87.19  
  
[facility]  
Medical History (Reviewed   
24 @ 13:01 by Kate Potts)  
  
Wears glasses  
History of steroid therapy  
History of pain when walking  
Anxiety  
Migraine headache  
Heartburn  
History of IBS  
Non-smoker  
History of drainage of   
abscess ( 2022)  
Pilonidal abscess of    
cleft  
Hand, foot and mouth disease  
  
  
Surgical History (Reviewed   
24 @ 13:01 by Kate Potts)  
  
S/P inguinal hernia repair  
History of incision and   
drainage  
History of   
esophagogastroduodenoscopy   
(EGD)  
History of arthroscopy of   
left knee  
History of surgical removal   
of ganglion cyst  
History of ankle surgery  
History of umbilical hernia   
repair  
History of laparoscopic   
cholecystectomy  
History of colonoscopy  
  
  
Family History (Reviewed   
24 @ 13:01 by Kate Potts)  
Father Colon polyps  
Diabetes  
AAA (abdominal aortic   
aneurysm)  
Hypertension  
Mother Colon polyps  
Neuropathy  
  
  
Social History (Reviewed   
24 @ 13:01 by Kate Potts)  
Smoking Status: Never smoker  
  
  
  
Assessment and Plan (No   
Qualifiers)  
Assessment and Plan  
(1) S/P bilateral inguinal   
hernia repair:  
Status: Acute  
Plan:  
Recommend no lifting greater   
than 20 pounds for 6   
additional weeks  
RTW letter was provided  
Follow-up as needed  
  
  
24 0728  
  
Date ___________   
____________________________  
_______  
  
Doreen Robison Signature: Date   
___________   
____________________________  
_______  
  
(if applicable)  
  
CC:                 Normal                                  [facility]  
   
                                                    Discharge Instructionon    
   
                                                    Discharge   
Instruction                             [facility]  
Medical Records Department  
[address]  
[place] [address]  
  
Instructions for   
Home/Discharge Instructions  
24  
MR#: [id number] Acct:   
[id number]  
Name: MAGGY CARPIO Rep   
#: [id number]  
: 1975 48 From:   
Faye Valero MD  
PCP: Dr. Jose Sloan MD Status:REG [facility]  
  
  
Discharge Instructions  
Procedure  
General Surgery  
Diet  
Discharge Diet: Light diet -   
advance as tolerated (if you   
have questions about your   
diet  
instructions, please talk to   
you doctor.)  
Activity  
Discharge Activity: May Not   
Drive (for 3-5 days or while   
taking narcotic pain   
medicine.)  
May shower in (days): 1  
Lifting Restrictions: 10   
pounds  
Dressing / Incision  
Call your doctor if your   
incision/area has:   
Continuous Slow Oozing,   
Sudden Increased Bleeding,  
Increased Pain/ Swelling,   
Increased Redness and Foul   
Smelling Discharge  
Call your doctor if you   
observe: Fever of 101 or   
Higher  
Suture Line Care: Avoid   
Pulling/Pushing and Avoid   
Pinching/Bending  
Additional Dressing/Incision   
Instructions:: Change or   
remove dressing in 4 days.   
Leave steri-strips  
in place for 1 week.  
Follow Up Care  
Please Follow Up With:   
Faye Valero MD  
When: Call [phone] to   
make an appointment to be   
seen in about 10 days.  
Test Results:  
Test results from this visit   
will be discussed in further   
detail at your follow-up   
appointment, if  
applicable.  
  
  
Discharge Plan  
Admission  
Attending Provider:   
Faye Valero  
  
Primary Care Provider:   
Jose Sloan  
  
Instructions  
Print Language: English  
  
Discharge   
Orders/Prescriptions  
Prescriptions:  
No Action  
cholecalciferol (vitamin D3)   
125 mcg (5,000 unit) capsule  
125 mcg PO DAILY  
magnesium glycinate 100 mg   
tablet  
100 mg PO DAILY  
  
Referrals / Follow Up:  
Jose Sloan MD   
[Primary Care Provider] -  
  
Disposition  
Disposition (needs filled in   
before D/C Order can be   
placed): Home, Self Care  
  
  
  
  
  
24 0944 Faye Valero MD  
  
  
  
CC: Dr. Jose Sloan MD  
Signed              Normal                                  [facility]  
   
                                                    Operative Reporton   
4   
   
                                        Operative Report    [facility]  
Medical Records Department  
[address] [place] [address]  
  
Operative Report  
24 0903  
MR#: [id number] Acct:   
[id number]  
Name: MAGGY CARPIO Rep   
#: [id number]  
: 1975 48 From:   
Faye Valero MD  
PCP: Dr. Jose Sloan MD Status:REG [facility]  
  
Location: [place] [address]  
  
  
  
  
Report of Operation  
Date of Procedure: 24  
Pre-Operative Diagnosis:   
Indirect right inguinal   
hernia possible left   
inguinal hernia  
Post-Operative Diagnosis:   
Bilateral indirect inguinal   
hernias with moderately   
large cord lipoma on  
the right  
Surgery/Procedure   
Performed:: Laparoscopic   
bilateral inguinal   
herniorrhaphy  
Extra-large Bard 3D max mesh   
right Lot number [id number],   
reference [id number], expiry   
date 2027  
Large Bard 3D max left mesh   
lot number [id number],   
reference [id number], expiry   
date 2028  
Secure strap Lot [id number],   
expiry date 2025  
Description of Surgical   
Findings::  
Timeout informed consent was   
obtained. 48-year-old female   
was taken to the op room   
placed on the  
table underwent general   
tracheal ovation esthesia   
Ancef 2 g given   
intravenously the abdomen  
sterilely prepped and draped   
patient has a previous   
curvilinear incision in the   
inferior portion of  
the umbilicus the skin scar   
was elliptically excised   
sharp dissection carried   
down through subcu  
tissue and fascia was   
identified holding sutures   
of 0 Vicryl placed the   
fascia was elevated small  
incision was created became   
evident that there was a   
piece of mesh slipped just   
beneath the lower  
edge use that couple more 0   
Vicryl sutures to elevate   
the posterior fascia   
directly made an opening  
directly was able to   
visualize and placed a   
trocar. No evidence of any   
trocar injuries the abdomen  
was insufflated with CO2 to   
a pressure of 10 mmHg   
pressure. Under direct   
visualization bilateral  
ilioinguinal nerve blocks   
were performed with 0.5%   
Marcaine. Throughout the   
procedure a total of 30  
cc was used. 5 mm trocars   
were placed on the right and   
left lower quadrant. There   
was evidence of  
an indirect inguinal hernia   
on the right and smaller   
defect on the left. The   
peritoneum superior  
lateral to the internal ring   
on the right was incised   
carried medially the   
peritoneum was completely  
dissected free of marginal   
large cord lipoma on the   
right was identified this   
had to be dissected  
free and throughout that   
hemostasis attained with   
hemoclips and   
electrocautery. Because of   
dense  
adherence of the peritoneum   
to the right round ligament   
I put Hem-o-matt clips on the   
round ligament  
and transected it. This   
allowed for full exposure to   
the direct indirect and   
femoral area on the  
right. I then incised the   
peritoneum superior lateral   
to the internal ring on the   
left. It  
medially dissected the   
peritoneum completely free a   
much smaller defect was   
noted on the left  
indirect. The peritoneum   
could be to completely   
dissected free this time   
from the round ligament  
leaving the round ligament   
intact. The dissection from   
the right was visualized   
pubic tubercle  
identified.  
  
Extra-large Bard 3D max was   
placed on the right it was   
secured laterally superiorly   
and medially  
there was secure strap the   
edge of the mesh was secured   
to the pubic tubercle and   
nicely folded  
beneath the tubercle   
tubercle to keep the direct   
indirect and femoral areas   
nicely covered. I  
placed a large mesh on the   
left the to overlap slightly   
in the middle secured   
laterally superiorly  
and medially with secure   
strap and also secured it to   
the pubic tubercle. I felt   
that I had  
excellent coverage   
bilaterally. The cord lipoma   
on the right was placed back   
underneath the  
peritoneum and the   
peritoneum was approximated   
to itself using combination   
of secure strap and  
Hem-o-matt clips. Complete   
obliteration to the mesh was   
achieved.  
  
The abdomen is allowed to   
deflate of the CO2. The   
fascial defect was   
meticulously approximated   
with  
simple sutures of 0 Nurolon.   
Good closure was felt to   
have been achieved. Wound   
edges were  
approximated with   
interrupted a running   
subicular 4-0 Monocryl.   
Steri-Strips Telfa OpSite   
dressings  
applied. Sponge and   
instrument and needle counts   
were reported to the surgeon   
to be correct.  
  
Specimens none. Drains none.   
Blood loss 20 cc.  
  
The patient was taken to the   
recovery room in satisfied   
condition without apparent   
complication  
  
Faye Valero M.D.,   
F.A.C.S.  
Surgeon: Faye Valero  
Type of Anesthesia: General   
and Local  
Anesthesiologist:   
Meghan Menezes  
  
  
  
24 0944  
  
  
Cosigner Signature (if   
applicable):  
  
  
  
CC: Dr. Jose Sloan MD; Dr. Faye Valero MD  
  
Signed              Normal                                  [facility]  
   
                                                    Pregnancy,Urineon 2024  
   
   
                                                    Beta HCG   
(pregnancy test)   
Ql (U)          Negative        Normal                          [facility]  
   
                                        Comment on above:   Result Comment: Very  
 dilute urine specimens, as indicated by a  
low   
specific  
gravity, may not contain representative levels of hCG.  
If pregnancy is still suspected, a first morning urine  
specimen should be collected 48 hours later and tested.   
   
                                                            Performed By: #### L  
500.4050, L503.6030, L500.4100, .3500,   
L503.0105, L506.0400, L503.6550, L501.9985, L506.1000, L506.0250,   
L100.0500, L501.2450, L501.9520 ####  
[facility]  
[address] [place], [address] [phone]   
   
                                                    Insulin Levelon 2024   
   
                      INSULIN,FASTING 8.5 uIU/mL Normal     2.6-24.9   [facility]  
   
                                        Comment on above:   Order Comment: AARONAS  
E ADD ON TO BLOOD IN LAB, THANKS   
   
                                                            Result Comment: Perf  
ormed at: [facility] [place] [address] [id number]  
: Jerry Bobby PhD, Phone: [phone]   
   
                                                            Performed By: #### L  
3300.3500 ####  
[facility]  
[address] [place], [address] [phone]   
   
                                                    L803.0600on 2024   
   
                      HOMOCYSTEINE 10.1 umol/L Normal     0.0-14.5   [facility]  
   
                                        Comment on above:   Result Comment: Perf  
ormed at: [facility] [place] [address] [id number]  
: Jerry Bobby PhD, Phone: [phone]   
   
                                                            Performed By: #### L  
500.4050, L503.6030, L500.4100, .3500,   
L503.0105, L506.0400, L503.6550, L501.9985, L506.1000, L506.0250,   
L100.0500, L501.2450, L501.9520 ####  
[facility]  
[address] [place], [address]  
[phone]   
   
                                                    CBC-Complete Blood Cnt No Di  
ffon 2024   
   
                                                    Erythrocyte   
distribution width   
(RBC) [Ratio]   11.9 %          Normal          11.6-14.6       [facility]  
   
                                        Comment on above:   Order Comment: PLEAS  
E ADD ON TO BLOOD IN LAB, THANKS   
   
                                                            Performed By: #### L  
3300.3500 ####  
[facility]  
[address] [place], [address]  
[phone]   
   
                                                    Hematocrit (Bld)   
[Volume fraction] 43.5 %          Normal          37-47           [facility]  
   
                                        Comment on above:   Order Comment: PLEAS  
E ADD ON TO BLOOD IN LAB, THANKS   
   
                                                            Performed By: #### L  
3300.3500 ####  
[facility]  
[address] [place], [address]  
[phone]   
   
                                                    Hemoglobin (Bld)   
[Mass/Vol]      14.5 g/dL       Normal          12.0-15.0       [facility]  
   
                                        Comment on above:   Order Comment: PLEAS  
E ADD ON TO BLOOD IN LAB, THANKS   
   
                                                            Performed By: #### L  
3300.3500 ####  
[facility]  
[address] [place], [address]  
[phone]   
   
                                                    MCH (RBC) [Entitic   
mass]           31.2 pg         Normal          27.0-32.0       [facility]  
   
                                        Comment on above:   Order Comment: PLEAS  
E ADD ON TO BLOOD IN LAB, THANKS   
   
                                                            Performed By: #### L  
3300.3500 ####  
[facility]  
[address] [place], [address]  
[phone]   
   
                                                    MCHC (RBC)   
[Mass/Vol]      33.3 g/dL       Normal          32-36           [facility]  
   
                                        Comment on above:   Order Comment: PLEAS  
E ADD ON TO BLOOD IN LAB, THANKS   
   
                                                            Performed By: #### L  
3300.3500 ####  
[facility]  
[address] [place], [address]  
[phone]   
   
                                                    MCV (RBC) [Entitic   
vol]            93.5 fL         Normal          81-99           [facility]  
   
                                        Comment on above:   Order Comment: PLEAS  
E ADD ON TO BLOOD IN LAB, THANKS   
   
                                                            Performed By: #### L  
3300.3500 ####  
[facility]  
[address] [place], [address]  
[phone]   
   
                                                    Platelet mean   
volume (Bld)   
[Entitic vol]   10.4 fL         Normal          6.2-12.0        [facility]  
   
                                        Comment on above:   Order Comment: PLEAS  
E ADD ON TO BLOOD IN LAB, THANKS   
   
                                                            Performed By: #### L  
3300.3500 ####  
[facility]  
[address] [place], [address]  
[phone]   
   
                                                    Platelets (Bld)   
[#/Vol]         232 10*3/uL     Normal          150-450         [facility]  
   
                                        Comment on above:   Order Comment: PLEAS  
E ADD ON TO BLOOD IN LAB, THANKS   
   
                                                            Performed By: #### L  
3300.3500 ####  
[facility]  
[address] [place], [address]  
[phone]   
   
                      RBC (Bld) [#/Vol] 4.65 10*6/uL Normal     4.2-5.4    [facility]  
   
                                        Comment on above:   Order Comment: PLEAS  
E ADD ON TO BLOOD IN LAB, THANKS   
   
                                                            Performed By: #### L  
3300.3500 ####  
[facility]  
[address] [place], [address]  
[phone]   
   
                      RDW SD     41.2 fl    Normal     35.1-43.9  [facility]  
   
                                        Comment on above:   Order Comment: PLEAS  
E ADD ON TO BLOOD IN LAB, THANKS   
   
                                                            Performed By: #### L  
3300.3500 ####  
[facility]  
[address] [place], [address]  
[phone]   
   
                      WBC (Bld) [#/Vol] 4.3 10*3/uL Low        4.4-11.0   [facility]  
   
                                        Comment on above:   Order Comment: PLEAS  
E ADD ON TO BLOOD IN LAB, THANKS   
   
                                                            Performed By: #### L  
3300.3500 ####  
[facility]  
[address] [place], [address]  
[phone]   
   
                                                    CRPon 2024   
   
                      C-REACTIVE PROT 5.66 mg/L  High       0.0-3.0    [facility]  
   
                                        Comment on above:   Order Comment: PLEAS  
E ADD ON TO BLOOD IN LAB, THANKS   
   
                                                            Result Comment: C-Re  
active Protein (CRP) provides useful   
information for the  
diagnosis, therapy and monitoring of inflammatory processes  
and associated diseases. For the evaluation of Relative Risk  
for Cardiovascular Disease, a High Sensitivity CRP (HSCRP)  
should be ordered.   
   
                                                            Performed By: #### L  
3300.3500 ####  
[facility]  
[address] [place], [address]  
[phone]   
   
                                                    Comprehensive Metabolic Prof  
ilon 2024   
   
                      Albumin [Mass/Vol] 3.9 g/dL   Normal     3.2-5.0    [facility]  
   
                                        Comment on above:   Order Comment: PLEAS  
E ADD ON TO BLOOD IN LAB, THANKS   
   
                                                            Performed By: #### L  
3300.3500 ####  
[facility]  
[address] [place], [address]  
[phone]   
   
                                                    Albumin/Globulin   
[Mass ratio]    1.1 {ratio}     Normal          0.9-2.4         [facility]  
   
                                        Comment on above:   Order Comment: PLEAS  
E ADD ON TO BLOOD IN LAB, THANKS   
   
                                                            Performed By: #### L  
3300.3500 ####  
[facility]  
[address] [place], [address]  
[phone]   
   
                      ALK P      96 U/L     Normal          [facility]  
   
                                        Comment on above:   Order Comment: PLEAS  
E ADD ON TO BLOOD IN LAB, THANKS   
   
                                                            Performed By: #### L  
3300.3500 ####  
[facility]  
[address] [place], [address]  
[phone]   
   
                                                    ALT [Catalytic   
activity/Vol]   34 U/L          Normal          13-56           [facility]  
   
                                        Comment on above:   Order Comment: PLEAS  
E ADD ON TO BLOOD IN LAB, THANKS   
   
                                                            Performed By: #### L  
3300.3500 ####  
[facility]  
[address] [place], [address]  
[phone]   
   
                                                    AST [Catalytic   
activity/Vol]   19 U/L          Normal          15-37           [facility]  
   
                                        Comment on above:   Order Comment: PLEAS  
E ADD ON TO BLOOD IN LAB, THANKS   
   
                                                            Performed By: #### L  
3300.3500 ####  
[facility]  
[address] [place], [address]  
[phone]   
   
                                                    Bilirubin   
[Mass/Vol]      0.50 mg/dL      Normal          0.20-1.00       [facility]  
   
                                        Comment on above:   Order Comment: PLEAS  
E ADD ON TO BLOOD IN LAB, THANKS   
   
                                                            Result Comment: For   
patients on eltrombopag therapy, use of  
Dimension [place] TBIL is not recommended.   
   
                                                            Performed By: #### L  
3300.3500 ####  
[facility]  
[address] [place], [address]  
[phone]   
   
                      BUN/CRE    18.8 RATIO Normal     10-20      [facility]  
   
                                        Comment on above:   Order Comment: PLEAS  
E ADD ON TO BLOOD IN LAB, THANKS   
   
                                                            Performed By: #### L  
3300.3500 ####  
[facility]  
[address] [place], [address]  
[phone]   
   
                      CA,Total   8.9 mg/dL  Normal     8.5-10.1   [facility]  
   
                                        Comment on above:   Order Comment: PLEAS  
E ADD ON TO BLOOD IN LAB, THANKS   
   
                                                            Performed By: #### L  
3300.3500 ####  
[facility]  
[address] [place], [address]  
[phone]   
   
                                                    Chloride   
[Moles/Vol]     107 mmol/L      Normal                    [facility]  
   
                                        Comment on above:   Order Comment: PLEAS  
E ADD ON TO BLOOD IN LAB, THANKS   
   
                                                            Performed By: #### L  
3300.3500 ####  
[facility]  
[address] [place], [address]  
[phone]   
   
                      CO2 [Moles/Vol] 23.0 mmol/L Normal     21.0-32.0  [facility]  
   
                                        Comment on above:   Order Comment: PLEAS  
E ADD ON TO BLOOD IN LAB, THANKS   
   
                                                            Performed By: #### L  
3300.3500 ####  
[facility]  
[address] [place], [address]  
[phone]   
   
                                                    Creatinine   
[Mass/Vol]      0.80 mg/dL      Normal          0.55-1.02       [facility]  
   
                                        Comment on above:   Order Comment: PLEAS  
E ADD ON TO BLOOD IN LAB, THANKS   
   
                                                            Result Comment: The   
validity of the calculated GFR GFRAA in   
patients over  
70 years has not been determined. Clinical correlation is  
essential.   
   
                                                            Performed By: #### L  
3300.3500 ####  
[facility]  
[address] [place], [address]  
[phone]   
   
                      EST GFR - AA 99 mL/min  Normal     >60        [facility]  
   
                                        Comment on above:   Order Comment: PLEAS  
E ADD ON TO BLOOD IN LAB, THANKS   
   
                                                            Result Comment: Afri  
can American GFR Calc   
   
                                                            Performed By: #### L  
3300.3500 ####  
[facility]  
[address] [place], [address]  
[phone]   
   
                      GAP        9          Normal     5-15       [facility]  
   
                                        Comment on above:   Order Comment: PLEAS  
E ADD ON TO BLOOD IN LAB, THANKS   
   
                                                            Performed By: #### L  
3300.3500 ####  
[facility]  
[address] [place], [address]  
[phone]   
   
                                                    GFR/1.73 sq   
M.predicted among   
non-blacks MDRD   
(S/P/Bld) [Vol   
rate/Area]      82 mL/min/{1.73_m2} Normal          >60             [facility]  
   
                                        Comment on above:   Order Comment: PLEAS  
E ADD ON TO BLOOD IN LAB, THANKS   
   
                                                            Result Comment: Non-  
 GFR Calc   
   
                                                            Performed By: #### L  
3300.3500 ####  
[facility]  
[address] [place], [address]  
[phone]   
   
                                                    Globulin (S)   
[Mass/Vol]      3.5 g/dL        Normal          2.2-4.2         [facility]  
   
                                        Comment on above:   Order Comment: PLEAS  
E ADD ON TO BLOOD IN LAB, THANKS   
   
                                                            Performed By: #### L  
3300.3500 ####  
[facility]  
[address] [place], [address]  
[phone]   
   
                      Glucose [Mass/Vol] 87 mg/dL   Normal          [facility]  
   
                                        Comment on above:   Order Comment: PLEAS  
E ADD ON TO BLOOD IN LAB, THANKS   
   
                                                            Performed By: #### L  
3300.3500 ####  
[facility]  
[address] [place], [address]  
[phone]   
   
                                                    Potassium   
[Moles/Vol]     4.3 mmol/L      Normal          3.5-5.1         [facility]  
   
                                        Comment on above:   Order Comment: PLEAS  
E ADD ON TO BLOOD IN LAB, THANKS   
   
                                                            Performed By: #### L  
3300.3500 ####  
[facility]  
[address] [place], [address]  
[phone]   
   
                      Sodium [Moles/Vol] 139 mmol/L Normal     136-145    [facility]  
   
                                        Comment on above:   Order Comment: PLEAS  
E ADD ON TO BLOOD IN LAB, THANKS   
   
                                                            Performed By: #### L  
3300.3500 ####  
[facility]  
[address] [place], [address]  
[phone]   
   
                      T PROT     7.4 g/dL   Normal     6.4-8.2    [facility]  
   
                                        Comment on above:   Order Comment: PLEAS  
E ADD ON TO BLOOD IN LAB, THANKS   
   
                                                            Performed By: #### L  
3300.3500 ####  
[facility]  
[address] [place], [address]  
[phone]   
   
                                                    Urea nitrogen   
[Mass/Vol]      15 mg/dL        Normal          7-18            [facility]  
   
                                        Comment on above:   Order Comment: PLEAS  
E ADD ON TO BLOOD IN LAB, THANKS   
   
                                                            Performed By: #### L  
3300.3500 ####  
[facility]  
[address] [place], [address]  
[phone]   
   
                                                    Free T3on 2024   
   
                      Free T3 [Mass/Vol] 2.9 pg/mL  Normal     2.18-3.98  [facility]  
   
                                        Comment on above:   Order Comment: Order  
 Date: 24Order Info: 0786-1 -   
CMPOrder   
Info: [id number] - LIPIDOrder Info: [id number] - CRPOrder Info: 3051-0 -   
O9DRcsuq Info: 3016-3 - TSHOrder Info: 3024-7 - [id number]   
   
                                                            Performed By: #### L  
500.4050, L503.6030, L500.4100, .3500,   
L503.0105, L506.0400, L503.6550, L501.9985, L506.1000, L506.0250,   
L100.0500, L501.2450, L501.9520 ####  
[facility]  
[address] [place] [place], [address]  
[phone]   
   
                                                    Lipid Profileon 2024   
   
                                                    Cholesterol   
[Mass/Vol]      271 mg/dL       High            200             [facility]  
   
                                        Comment on above:   Order Comment: PLEAS  
E ADD ON TO BLOOD IN LAB, THANKS   
   
                                                            Result Comment: <200  
 mg/dL Desirable  
200-240 mg/dL Borderline  
>240 mg/dL High Risk   
   
                                                            Performed By: #### L  
3300.3500 ####  
[facility]  
[address] [place], [address]  
[phone]   
   
                                                    Cholesterol in HDL   
[Mass/Vol]      62 mg/dL        Normal                          [facility]  
   
                                        Comment on above:   Order Comment: PLEAS  
E ADD ON TO BLOOD IN LAB, THANKS   
   
                                                            Result Comment: The   
drugs N-Acetylcysteine and Metamizole may   
falsely  
depress this assay.  
Reference Range  
HDL <40 mg/dL Low HDL Cholesterol  
HDL >or= 60 mg/dL High HDL Cholesterol   
   
                                                            Performed By: #### L  
3300.3500 ####  
[facility]  
[address] [place], [address]  
[phone]   
   
                                                    Cholesterol in LDL   
[Mass/Vol]      181 mg/dL       High            0-130           [facility]  
   
                                        Comment on above:   Order Comment: PLEAS  
E ADD ON TO BLOOD IN LAB, THANKS   
   
                                                            Performed By: #### L  
3300.3500 ####  
[facility]  
[address] [place], [address]  
[phone]   
   
                                                    Cholesterol in   
VLDL [Mass/Vol] 28 mg/dL        Normal          5-40            [facility]  
   
                                        Comment on above:   Order Comment: PLEAS  
E ADD ON TO BLOOD IN LAB, THANKS   
   
                                                            Performed By: #### L  
3300.3500 ####  
[facility]  
[address] [place], [place], [address] [phone]   
   
                                                    Triglyceride   
[Mass/Vol]      138 mg/dL       Normal                          [facility]  
   
                                        Comment on above:   Order Comment: PLEAS  
E ADD ON TO BLOOD IN LAB, THANKS   
   
                                                            Result Comment: The   
drugs N-Acetylcysteine and Metamizole may   
falsely  
depress this assay.  
Serum Triglycerides Reference Interval  
Normal <150 mg/dL  
Borderline high 150 - 199 mg/dL  
High 200 - 499 mg/dL  
Very High > or = 500 mg/dL   
   
                                                            Performed By: #### L  
3300.3500 ####  
[facility]  
[address] [place], [address]  
[phone]   
   
                                                    T4 Free Directon 2024   
   
                      T4 FREE DIRECT 0.89 ng/dL Normal     0.76-1.46  [facility]  
   
                                        Comment on above:   Order Comment: Order  
 Date: 24Order Info: 0786-1 -   
CMPOrder   
Info: [id number] - LIPIDOrder Info: [id number] - CRPOrder Info: 3051-0 -   
P8HGqsyt Info: 3016-3 - TSHOrder Info: 3024-7 - [id number]   
   
                                                            Performed By: #### L  
500.4050, L503.6030, L500.4100, .3500,   
L503.0105, L506.0400, L503.6550, L501.9985, L506.1000, L506.0250,   
L100.0500, L501.2450, L501.9520 ####  
[facility]  
[address] [place] [place], [address]  
[phone]   
   
                                                    Thyroid Stim Hormone (TSH)on  
 2024   
   
                      TSH        1.54 uIU/mL Normal     0.358-3.74 [facility]  
   
                                        Comment on above:   Order Comment: Order  
 Date: 24Order Info: 0786-1 -   
CMPOrder   
Info: [id number] - LIPIDOrder Info: [id number] - CRPOrder Info: 3051-0 -   
O7LDudxd Info: 3016-3 - TSHOrder Info: 3024-7 - [id number]   
   
                                                            Performed By: #### L  
500.4050, L503.6030, L500.4100, .3500,   
L503.0105, L506.0400, L503.6550, L501.9985, L506.1000, L506.0250,   
L100.0500, L501.2450, L501.9520 ####  
[facility]  
[address] [place], [place], [address] [phone]   
   
                                                    Vitamin D,25 Hydroxyon    
   
                      Vitamin D 25-OH 48.8 ng/mL Normal                [facility]  
   
                                        Comment on above:   Order Comment: GURMEET MORENO ADD ON TO BLOOD IN LAB, THANKS   
   
                                                            Result Comment: Krystal  
min D 25(OH) Status Range  
Deficiency <20 ng/mL (50nmol/L)  
Insufficiency 20 - 30 ng/mL (50 - 75 nmol/L)  
Sufficiency 30 - 100 ng/mL (75 - 250 nmol/L)  
Toxicity >100 ng/mL (>250 nmol/L)   
   
                                                            Performed By: #### L  
3300.3500 ####  
[facility]  
[address] [place], [place], [address] [phone]   
   
                                                    Surgery Visit Reporton    
   
                                                    Surgery Visit   
Report                                  [facility]  
[address]  
[place] [address]  
[phone]  
  
OFFICE VISIT  
Date of Service: 24  
  
MR#: [id number] Acct:   
[id number]  
  
Name: MAGGY CARPIO JOHN Rep   
#: [id number]  
5  
: 1975 Provider:   
MARIYA bueno  
Age/Sex: 48/F Location:   
[facility]  
  
Status: Signed  
  
Intake  
Vital Signs  
  
  
24  
15:12 24  
15:34  
  
Height 5 ft 10 in 5 ft 10 in  
  
Weight: 235 lb  
  
BMI 33.7  
  
/84 H 127/85 H  
  
Blood Pressure Location Rt   
brachial Rt brachial  
  
Position Sitting Sitting  
  
Respiration 16 18  
  
  
Intake  
Visit Reasons: UPDATE H P  
Chief Complaint: update H P  
 Required: No  
Is patient in pain?: No  
Allergies  
  
No Known Allergies Allergy   
(Verified 24 15:35)  
  
  
  
Medications  
  
cholecalciferol (vitamin D3)   
125 mcg (5,000 unit) capsule   
125 mcg PO DAILY 22   
[History  
Confirmed 24]  
magnesium glycinate 100 mg   
tablet 100 mg PO DAILY   
24 [History Confirmed   
24]  
  
  
  
PFS  
Medical History (Reviewed   
24 @ 15:34 by Kate Potts)  
  
Anxiety  
Hand, foot and mouth disease  
Heartburn  
History of drainage of   
abscess ( 2022)  
History of IBS  
History of pain when walking  
History of steroid therapy  
Migraine headache  
Non-smoker  
Pilonidal abscess of    
cleft  
Wears glasses  
  
  
Surgical History (Reviewed   
24 @ 15:34 by Kate Potts)  
  
History of ankle surgery  
History of arthroscopy of   
left knee  
History of colonoscopy  
History of   
esophagogastroduodenoscopy   
(EGD)  
History of incision and   
drainage  
History of laparoscopic   
cholecystectomy  
History of surgical removal   
of ganglion cyst  
History of umbilical hernia   
repair  
  
  
Family History (Reviewed   
24 @ 15:34 by Kate Potts)  
Father Colon polyps  
Diabetes  
AAA (abdominal aortic   
aneurysm)  
Hypertension  
Mother Colon polyps  
Neuropathy  
  
  
Social History (Reviewed   
24 @ 15:34 by Kate Potts)  
Smoking Status: Never smoker  
  
  
  
HPI  
HPI  
HPI:  
Patient is a 47 y/o F I am   
seeing for an update history   
and physical for an upcoming   
elective right  
possible bilateral inguinal   
hernia repair with mesh.   
Patient denies any recent   
hospitalization or  
illnesses. Patient denies   
denies any medication   
changes. She denies any   
compilations or side effects   
  
previously from anesthesia.   
Patient notes a previous   
history of umbilical hernia   
repair. Patient  
denies any cardiac or   
pulmonary concerns. She does   
not follow with a   
cardiologist. She is not  
currently on any blood   
thinners.  
  
Patient's previous history   
per Dr. Valero:  
48-year-old female. I have   
previously assisted her with   
a pilonidal abscess which   
had quite a  
delayed healing and required   
multiple office visits.She   
now presents with concerns   
regarding a  
possible hernia. The patient   
states that but tickly when   
she is standing that she is   
noted which  
she is suggesting some fatty   
fullness in the right medial   
groin area. She does not   
correlate this  
with any particular injury   
or accident. She likes   
running and is scheduled to   
do a running event in  
May. She is always able to   
get the area to reduce and   
actually can feel the area   
reduced when she  
lies supine.  
  
She has had a previous   
laparoscopic   
cholecystectomy. She has had   
a remote umbilical   
herniorrhaphy  
in childhood. No report of   
mesh in that location.  
  
She has been having some   
right lower quadrant   
discomfort which she always   
attributed to her right  
ovary.  
  
Upon self-examination she is   
wondering whether she has   
weakness on the left as well  
  
  
ROS  
General  
General: No weight change,   
appetite, fatigue, colon   
cancer, breast cancer or   
weakness  
HEENT  
HEENT: No difficulty   
swallowing, eye injury, eye   
surgery, swollen glands or   
hoarseness  
Endo  
Endocrine: No thyroid   
disease, diabetes mellitus,   
thyroid cancer, Hair loss,   
heat intolerance or  
cold intolerance  
Skin  
Skin: No rash or changing   
moles  
Breast  
Breast: No left breast lump,   
right breast lump, nipple   
discharge, breast pain,   
abnormal mammogram,  
abnormal US or breast   
enlargement  
Musc  
Musculoskeletal: No back   
problems, arthritis,   
rheumatoid arthritis, gout   
or joint pain  
Cardio  
Cardiovascular: No murmur,   
pacemaker, heart disease,   
atrial fibrillation, high   
blood pressure, heart  
attack, heart stent,   
palpitations, shortness of   
breat with exertion or chest   
pain  
Psych  
Psychiatric: No depression,   
anxiety or hearing voices  
Resp  
Respiratory: No shortness of   
breath, No sleep apnea, No   
cough, No COPD, No asthma,   
No emphysema and  
No wheezing  
Gastro  
Gastrointestinal: No   
abdominal pain, No nausea or   
vomiting, No diarrhea, No   
constipation, No blood  
in stool, No acid reflux, No   
hemorrhoids, No ulcers, No   
gallbladder problem and No   
black,tarry  
stools  
Hema  
Hematologic: No blood   
thinners, No blood   
disorders, No bleeding, No   
anemia and No blood clots  
Neuro  
Neurologic: No system   
reviewed and no additional   
complaints, except as (more   
content not included)... Normal                                  [facility] 2024   
   
                                        [facility]                HNO ID: [id number]  
Author: COORDINATOR,   
MAMMOGRAPHY, ?  
Service: ?  
Author Type: Physician  
Type: Letter  
Filed: 2024 08:44  
Note Text:  
2024  
PID:  
[id number] Maggy Carpio  
[address]  
[place] [address]  
Dear Ms. Carpio,  
We are pleased to inform you   
that the results of your   
recent breast imaging exam  
on 2024 are normal.  
Your mammogram demonstrates   
that you have dense breast   
tissue, which could hide  
abnormalities. Dense breast   
tissue, in and of itself, is   
a relatively common  
condition. Therefore, this   
information is not provided   
to cause undue concern;  
rather, it is to raise your   
awareness and promote   
discussion with your health  
care provider regarding the   
presence of dense breast   
tissue in addition to  
other risk factors.  
Early detection of cancer is   
very important. We also   
understand recommendations  
regarding breast cancer   
screening are controversial.   
Please discuss with your  
primary care provider which   
strategy is best for you and   
whether a mammogram is  
right for you.  
Your imaging studies and   
report will be kept on file   
at [facility] as part  
of your permanent medical   
record and are available for   
your continuing care.  
Thank you for allowing us to   
help in meeting your health   
care needs.  
Sincerely,  
Dr. Schmidt  
Interpreting Radiologist  
[facility]  
(Normal over 40)    Normal                                  [facility] SCREENING W TOMOon    
   
                                                    [facility] SCREENING W   
CLAUDETTE                                    * * *Final Report* * *  
DATE OF EXAM: 2024   
8:29AM  
[facility] 0582 - [facility] SCREENING W   
CLAUDETTE / ACCESSION # [id number]  
PROCEDURE REASON: Encounter   
for screening mammogram for   
breast cancer  
  
* * * * Physician   
Interpretation * * * *  
RESULT: #[id number] - EMILY   
SCREENING W CLAUDETTE  
BILATERAL DIGITAL SCREENING   
MAMMOGRAM TOMOSYNTHESIS WITH   
CAD: 2024  
HISTORY: Encounter For   
Screening Mammogram For   
Breast Cancer /Screening  
Mammogram with CLAUDETTE -   
patient reports NO breast   
symptoms/most recent  
mammogram done at [facility] /Patient   
has signed release for  
outside images that is   
scanned into [employer].  
RESULT:  
TECHNIQUE: The study was   
acquired using full field   
digital technology and  
interpreted from soft copy.  
Digital Breast Tomosynthesis   
(DBT) images were obtained   
and used to  
assist in the interpretation   
of this examination.  
Current study was also   
evaluated with a Computer   
Aided Detection (CAD).  
Comparison is made to exams   
dated: 2021 mammogram -   
[facility], 2018   
mammogram, and 2017   
mammogram. The  
breasts are heterogeneously   
dense, which may obscure   
small masses.  
No significant masses,   
calcifications, or other   
findings are seen in  
either breast.  
There has been no   
significant interval change.  
IMPRESSION: NEGATIVE  
There is no mammographic   
evidence of malignancy. A 1   
year screening  
mammogram is recommended.  
Dipika Schmidt M.D., lm/ronny:2024 08:44:18  
Imaging Technologist(s):   
RT Rhoit(R)(M),   
[facility]  
letter sent: Normal over 40  
Mammogram BI-RADS: 1   
Negative  
Multiple national specialty   
organizations have released   
breast cancer  
screening guidelines for   
women at average risk for   
developing breast  
cancer - guidelines that are   
based on both evidence and   
opinion, yet  
differ on when to start and   
how often to screen for   
breast cancer. With  
representation from Breast   
Imaging, Internal Medicine,   
Women's Health,  
Family Medicine, and   
Medical/Surgical Oncology,   
the [facility] has  
carefully reviewed the data   
and reached the following   
consensus:  
1) All women should engage   
in shared decision-making   
with their providers  
to decide when to start and   
how often to screen;  
2) All women should have the   
opportunity to start   
screening mammography  
at age 40;  
3) For women ages 45-55, we   
recommend annual screening   
mammograms;  
4) For women ages 55 and   
over, we support both the   
transition from an  
annual to a biennial   
interval if this aligns more   
with patient's values  
and preferences, or   
continuation with annual   
screening;  
5) All women should discuss   
with their providers when to   
stop screening  
mammograms.  
: Ronny  
Transcribe Date/Time: 2024 8:06A  
Dictated by: DIPIKA SCHMIDT MD  
This examination was   
interpreted and the report   
reviewed and  
electronically signed by:  
DIPIKA SCHMIDT MD on 2024 8:44AM EST  
[id number] Normal                                  [facility]  
   
                                                    Surgery Visit Reporton    
   
                                                    Surgery Visit   
Report                                  [facility]  
[address]  
[place] [address]  
[phone]  
  
OFFICE VISIT  
Date of Service: 24  
  
MR#: [id number] Acct:   
[id number]  
  
Name: BALAJIMAGGY JOHN Rep   
#: [id number]  
8  
: 1975 Provider:   
Dr. Faye lindsey MD  
Age/Sex: 48/F Location:   
[facility]  
  
Status: Signed  
  
Intake  
Vital Signs  
  
  
24  
15:12  
  
Height 5 ft 10 in  
  
/84 H  
  
Blood Pressure Location Rt   
brachial  
  
Position Sitting  
  
Respiration 16  
  
  
Intake  
Visit Reasons: I D of   
pilonidal cyst  
Chief Complaint: inflamed   
pilonidal cyst  
 Required: No  
Accompanied by:   
Is patient in pain?: No  
Allergies  
  
No Known Allergies Allergy   
(Verified 24 12:24)  
  
  
  
Medications  
  
cholecalciferol (vitamin D3)   
125 mcg (5,000 unit) capsule   
125 mcg PO DAILY 22   
[History  
Confirmed 24]  
magnesium glycinate 100 mg   
tablet 100 mg PO DAILY   
24 [History Confirmed   
24]  
  
  
  
[facility]  
Medical History (Reviewed   
24 @ 12:23 by April Aguilar)  
  
Abscess  
Anxiety  
Hand, foot and mouth disease  
Heartburn  
History of drainage of   
abscess ( 2022)  
History of IBS  
Migraine headache  
Non-smoker  
Pilonidal abscess of    
cleft  
  
  
Surgical History (Reviewed   
24 @ 12:23 by April Aguilar)  
  
History of ankle surgery  
History of arthroscopy of   
left knee  
History of colonoscopy  
History of   
esophagogastroduodenoscopy   
(EGD)  
History of incision and   
drainage  
History of laparoscopic   
cholecystectomy  
History of surgical removal   
of ganglion cyst  
History of umbilical hernia   
repair  
  
  
Family History (Reviewed   
24 @ 12:23 by April Aguilar)  
Father Colon polyps  
Diabetes  
AAA (abdominal aortic   
aneurysm)  
Hypertension  
Mother Colon polyps  
Neuropathy  
  
  
Social History (Reviewed   
24 @ 12:23 by April Aguilar)  
Smoking Status: Never smoker  
  
  
  
HPI  
HPI  
HPI:  
Patient is 1 year out status   
post resolving what appeared   
to be a pilonidal abscess.   
She has just a  
small amount of redness in   
the even more smaller amount   
of little pustule  
  
Exam  
Skin  
Other:  
With nursing present the   
sacrococcygeal area was   
inspected. 3 mm to the right   
of the midline there  
is minimal erythema and a 1   
mm whitish discoloration.  
  
Office Procedures  
Procedure Time Out  
Time Out  
Informed consent given: Yes  
Consent signed: Yes  
Time out checklist: patient,   
procedure, site   
marked/identified,   
positioning of patient,   
supplies  
available, allergies   
confirmed and team agrees on   
procedure  
Time out staff in room: Yes  
Time out verified: Yes  
Time out date: 24  
Time out time: 12:25  
  
  
Assessment and Plan  
Assessment and Plan  
(1) Pilonidal abscess of   
 cleft:  
Status: Acute  
Plan:  
The area of concern was very   
diminutive. I had to use a   
bright light to inspect it.   
We did cleanse  
it with some Betadine use   
some local and I used   
splinter forceps to inspect   
the area a extraordinary  
minimal amount of pus was   
encountered that went from   
the right to the midline. I   
unroofed it for 3  
mm. Did not appear to have a   
depth more than 3 mm. No   
further tracking. Treated   
with silver  
nitrate. I expect it to   
resolve. She will notify me   
if she has any difficulties   
and they may use  
silver nitrate as needed at   
home.  
  
Faye Valero M.D.,   
F.A.C.S.  
  
Coding  
Level of Care Code  
Off vis,est,level 2  
  
Diagnoses  
Pilonidal abscess of rubi   
cleft L05.24 1250  
  
Date ___________   
____________________________  
_______  
  
Faye Valero MD  
  
  
  
  
  
[facility] Signature: Date   
___________   
____________________________  
_______  
  
(if applicable)  
  
CC:                 Normal                                  [facility]   
   
                                        CN                Office Visit (OBGYWM  
)  
----------------------------  
----------------------------  
------------------------  
MAGGY CARPIO ([id number])   
1975 F  
Date Time Provider   
Department  
3/18/24 9:00 AM SHANI HOUSE OBGYWM  
During your visit today, we   
recorded the following   
information about you:  
Blood pressure Weight Height   
Last Period  
122/78 110.2 kg 1.778 m   
24  
Shani House MD   
3/18/2024 9:54 AM Signed  
Chaperone offered: Patient   
keily Smart is a 48 year old   
 who presents for an   
annual gynecologic exam with  
complaints, irregular   
bleeding and perimenopausal   
symptoms. Not sleeping as  
well- more hot flashes/night   
sweats. More anxiety .   
Working at surgery center.  
Menses: Irregular- sometimes   
Skips 2 months now- 4-5   
flow.  
Contraception: vasectomy  
HPV vaccine: No  
Last Pap: 2022 normal  
HPV: 11/3/2022 negative  
History of abnormal pap: No  
Last mammogram: normal  
Sexually active: Yes  
History of STDS: None  
Patient concerns for STD   
exposure: No.  
Pain with intercourse: No  
Postcoital bleeding: No  
Exercise: Personal training   
with Stephanie Dunnman 2 x week  
Diet: balanced  
OB History  
 T0  L0  
SAB0 IAB0 Ectopic0 Multiple0   
Live Births0  
Gyn History  
LMP: 2024, Having   
periods  
Age at Menarche:  
Age at First Pregnancy:  
Age at Menopause:  
Gyn History Comments:  
Sexual Activity: Yes; Male  
Contraception: Vasectomy  
PAST MEDICAL HISTORY  
Diagnosis Date  
Lactose intolerance  
Raynaud disease  
PAST SURGICAL HISTORY  
Procedure Laterality Date  
ANKLE ARTHROSCOPY Right   
2018  
COLONOSCOPY W/BIOPSY   
SINGLE/MULTIPLE 13  
EGD TRANSORAL BIOPSY   
SINGLE/MULTIPLE 13  
KNEE ARTHROSCOPY Left   
2017  
LAP MARVA W EXPLORATION OF   
CD 2012  
PAST SURGICAL HISTORY OF  
ganglion cyst on wrist x3  
REPAIR UMBILICAL HERNIA 1982  
SKIN BX, 1 LESION 14  
Punch bx right gluteal fold   
skin lesion  
FAMILY HISTORY  
Problem Relation Age of   
Onset  
Diabetes Father  
other (myasthenia gravis)   
Father  
SOCIAL HISTORY  
Social History  
Tobacco Use  
Smoking status: Never  
Smokeless tobacco: Never  
Vaping Use  
Vaping Use: Never used  
Substance Use Topics  
Alcohol use: Yes  
Comment: 3x monthly  
Drug use: Never  
REVIEW OF SYSTEMS  
Abdomen: No abdominal pain,   
nausea, vomiting, diarrhea,   
or constipation. No  
bloating, early satiety,   
indigestion, or increased   
flatulence.  
Bladder: No dysuria, gross   
hematuria, urinary   
frequency, urinary urgency,   
or  
incontinence.  
Breast: No breast lumps,   
nipple d/c, overlying skin   
changes, redness or skin  
retraction.  
Allergies and current   
medication updated:Yes  
EXAM: /78   Ht 5' 10    
(1.78m)   Wt 243 lb   
(110.2kg)   LMP 2024    
BMI 34.87 kg/(m2).  
GENERAL: pleasant,    
female in no apparent   
distress  
HEENT: Normocephalic,   
atraumatic, mucus membranes   
moist, and no lesions  
NECK: Supple, full range of   
motion, no adenopathy, and   
thyroid normal  
DERMATOLOGY: Normal, without   
lesions, non-icteric, and   
non-hirsute  
BREAST: soft, non-tender,   
symmetric, no dominant mass,   
normal nipple-areolar  
complex, no lymphadenopathy,   
and no nipple discharge  
ABDOMEN: soft, non-tender,   
and no masses  
PELVIC: external genitalia   
normal, normal Bartholin's   
glands, urethra, [place]'s  
glands, no vulvar lesions,   
no cervical lesions, good   
vaginal support,  
physiologic discharge   
present, normal appearing   
perineal body and perianal  
region  
BIMANUAL: uterus normal   
size, shape and consistency,   
no adnexal masses, and  
non-tender  
RECTOVAGINAL: deferred.  
NEURO: alert and oriented   
x3,exam grossly non-focal  
EXTREMITIES: normal  
ASSESSMENT/PLAN:  
1) Health maintenance:  
Pap/HPV up to date.  
Mammogram ordered.  
Nutrition, exercise and   
routine health maintenance   
exams reviewed.  
2) Contraception: vasectomy.   
Contraceptive options   
reviewed and information  
provided.  
3) STD screening: Declined   
STD check.  
4) Follow up one year or   
sooner as needed  
5) reviewed options for   
perimenopausal symptoms   
hormonal vs non hormonal.  
Shani Walsh MD  
Allergies As of Date:   
2024  
(No Known Allergies)  
Date Reviewed: 2024  
Reviewed by: Shweta Paz MA - Fully Assessed  
Reason for Visit:  
Yearly Exam [187]  
Primary Visit   
Diagnosis:Encounter for   
gynecological examination   
(general)  
(routine) without abnormal   
findings [Z01.419]  
Other Visit   
Diagnosis:Encounter for   
screening mammogram for   
breast cancer  
[Z12.31]  
Order(s):[facility] SCREENING W   
CLAUDETTE [[id number]] Order #:   
[id number] FUTURE  
Prescriptions as of   
2024  
- mecobalamin (B12 ACTIVE   
ORAL)  
- ASHWAGANDHA ROOT EXTRACT   
ORAL  
- vitamin B complex (B   
COMPLEX 1 ORAL)  
- zinc carnosine (ZINLORI)   
75 mg tablet  
- cholecalciferol (VITAMIN   
D-3) 5,000 unit tab  
Take 5,000 Units by mouth   
once daily.  
- Magnesium Oxide 250 mg   
magnesium tab  
Take 240 mg by mouth.  
- zinc sulfate (ZINC-15   
ORAL)  
Take 16 mg by mouth three   
times daily.  
- ascorbic acid, vitamin C,   
(VITAMIN C) 500 mg tablet  
Take 600 mg by (more content   
not included)...    Normal                                  [facility]  
   
                                                    CT PELVIS WO IVCONon   
024   
   
                                        CT PELVIS WO IVCON  * * *Final Report* *  
 *  
DATE OF EXAM: Mar 7 2024   
2:54PM  
[facility] 0556 - CT PELVIS WO   
IVCON / ACCESSION #   
[id number]  
PROCEDURE REASON: pelvis w/o  
  
* * * * Physician   
Interpretation * * * *  
EXAMINATION: CT PELVIS WITH   
IV CONTRAST  
CLINICAL HISTORY: History of   
pilonidal cyst, bilateral   
inguinal hernia  
repair, umbilical hernia   
repair  
TECHNIQUE: CT of the pelvis   
was performed using standard   
technique,  
scanning from just above the   
iliac crests to the   
symphysis pubis.  
Contrast:  
CT Radiation dose:   
Integrated Dose-length   
product (DLP) for this visit   
=  
655 mGy*cm.  
CT Dose Reduction Employed:   
Automated exposure   
control(AEC) and iterative  
recon  
COMPARISON: None.  
RESULT:  
Pelvis: No pelvic ascites or   
mass. 4 cm left adnexal   
simple cyst.  
GI tract: The visualized   
small bowel is unremarkable   
without evidence of  
dilatation or wall   
thickening. The visualized   
colon is unremarkable.  
Normal appendix.  
Lymph nodes: No abdominal or   
pelvic lymphadenopathy.  
Mesentery/Peritoneum: No   
ascites or mass.  
Retroperitoneum: No mass.  
Vasculature: No abdominal   
aortic or iliac artery   
aneurysm.  
Bones/Soft Tissues: Small   
right fat-containing   
inguinal hernia. No soft  
tissue abnormalities along   
the gluteal cleft suggest   
pilonidal cyst.  
IMPRESSION:  
No acute findings in the   
pelvis.  
Small right fat-containing   
inguinal hernia.  
: ALICJA  
Transcribe Date/Time: Mar 9   
2024 3:27P  
Dictated by : TUNDE SIMS MD  
This examination was   
interpreted and the report   
reviewed and  
electronically signed by:  
TUNDE SIMS MD on Mar 9   
2024 3:32PM EST  
[id number] Normal                                  [facility]  
   
                                                    Surgery Visit Reporton    
   
                                                    Surgery Visit   
Report                                  [facility]  
[address] [place] [address]. [address]  
[place], [place] [address] [phone]  
  
OFFICE VISIT  
Date of Service: 24  
  
MR#: [id number] Acct:   
[id number]  
  
Name: MAGGY CARPIO Rep   
#: [id number]  
9  
: 1975 Provider:   
Dr. Faye lindsey MD  
Age/Sex: 48/F Location:   
[facility]  
  
Status: Signed  
  
Intake  
Vital Signs  
  
  
22  
07:10 24  
15:12  
  
Height 5 ft 10 in 5 ft 10 in  
  
/84 H  
  
Blood Pressure Location Rt   
brachial  
  
Position Sitting  
  
Respiration 16  
  
  
Intake  
Visit Reasons: Possible   
hernia  
Chief Complaint: possible   
[facility]  
 Required: No  
Is patient in pain?: Yes   
(Right groin )  
Allergies  
  
No Known Allergies Allergy   
(Verified 24 15:14)  
  
  
  
Medications  
  
cholecalciferol (vitamin D3)   
125 mcg (5,000 unit) capsule   
125 mcg PO DAILY 22   
[History  
Confirmed 24]  
magnesium glycinate 100 mg   
tablet 100 mg PO DAILY   
24 [History Confirmed   
24]  
  
  
  
[facility]  
Medical History (Reviewed   
24 @ 15:12 by Kate Potts)  
  
Abscess  
Anxiety  
Hand, foot and mouth disease  
Heartburn  
History of drainage of   
abscess ( 2022)  
History of IBS  
Migraine headache  
Non-smoker  
Pilonidal abscess of    
cleft  
  
  
Surgical History (Reviewed   
24 @ 15:12 by Kate oPtts)  
  
History of ankle surgery  
History of arthroscopy of   
left knee  
History of colonoscopy  
History of   
esophagogastroduodenoscopy   
(EGD)  
History of incision and   
drainage  
History of laparoscopic   
cholecystectomy  
History of surgical removal   
of ganglion cyst  
History of umbilical hernia   
repair  
  
  
Family History (Reviewed   
24 @ 15:12 by Kate Potts)  
Father Colon polyps  
Diabetes  
AAA (abdominal aortic   
aneurysm)  
Hypertension  
Mother Colon polyps  
Neuropathy  
  
  
Social History (Reviewed   
24 @ 15:12 by Kate Potts)  
Smoking Status: Never smoker  
  
  
  
HPI  
HPI  
HPI:  
48-year-old female. I have   
previously assisted her with   
a pilonidal abscess which   
had quite a  
delayed healing and required   
multiple office visits.She   
now presents with concerns   
regarding a  
possible hernia. The patient   
states that but tickly when   
she is standing that she is   
noted which  
she is suggesting some fatty   
fullness in the right medial   
groin area. She does not   
correlate this  
with any particular injury   
or accident. She likes   
running and is scheduled to   
do a running event in  
May. She is always able to   
get the area to reduce and   
actually can feel the area   
reduced when she  
lies supine.  
  
She has had a previous   
laparoscopic   
cholecystectomy. She has had   
a remote umbilical   
herniorrhaphy  
in childhood. No report of   
mesh in that location.  
  
She has been having some   
right lower quadrant   
discomfort which she always   
attributed to her right  
ovary.  
  
Upon self-examination she is   
wondering whether she has   
weakness on the left as well  
  
ROS  
General  
General: No weight change,   
appetite, fatigue, colon   
cancer, breast cancer or   
weakness  
HEENT  
HEENT: No difficulty   
swallowing, eye injury, eye   
surgery, swollen glands or   
hoarseness  
Endo  
Endocrine: No thyroid   
disease, diabetes mellitus,   
thyroid cancer, Hair loss,   
heat intolerance or  
cold intolerance  
Skin  
Skin: No rash or changing   
moles  
Breast  
Breast: No left breast lump,   
right breast lump, nipple   
discharge, breast pain,   
abnormal mammogram,  
abnormal US or breast   
enlargement  
Musc  
Musculoskeletal: No back   
problems, arthritis,   
rheumatoid arthritis, gout   
or joint pain  
Cardio  
Cardiovascular: No murmur,   
pacemaker, heart disease,   
atrial fibrillation, high   
blood pressure, heart  
attack, heart stent,   
palpitations, shortness of   
breat with exertion or chest   
pain  
Psych  
Psychiatric: No depression,   
anxiety or hearing voices  
Resp  
Respiratory: No shortness of   
breath, No sleep apnea, No   
cough, No COPD, No asthma,   
No emphysema and  
No wheezing  
Gastro  
Gastrointestinal: No   
abdominal pain, No nausea or   
vomiting, No diarrhea, No   
constipation, No blood  
in stool, No acid reflux, No   
hemorrhoids, No ulcers, No   
gallbladder problem and No   
black,tarry  
stools  
Hema  
Hematologic: No blood   
thinners, No blood   
disorders, No bleeding, No   
anemia and No blood clots  
Neuro  
Neurologic: No system   
reviewed and no additional   
complaints, except as   
documented, No as per HPI,   
No   
abnormal gait, No abnormal   
hearing, No abnormal   
movements, No abnormal   
speech, No behavioral  
changes, No burning   
sensations, No confusion, No   
convulsions, No   
disequilibrium, No   
dizziness, No  
localized weakness, No   
frequent falls, No   
headache(s), No lack of   
coordination, No loss of   
vision,  
No memory loss, No numbness,   
No other visual   
disturbances, No radicular   
pain, No restless legs, No  
sensory deficit, No syncope,   
No tingling, No tremor(s),   
No weakness and No other  
  
Exam  
Const  
General: cooperative,   
healthy appearing,   
comfortable and no acute   
distress  
[facility]  
Head: normal to inspection  
Eyes  
General: appearance normal,   
both eyes (more content not   
included)...        Normal                                  [facility]  
   
                                                    T3 REVERSE (CCL)on   
3   
   
                      Reverse T3 10.0 ng/dL Normal     9.0-27.0   [facility]  
   
                                        Comment on above:   Result Comment: INTE  
RPRETIVE INFORMATION: Triiodothyronine,   
Reverse   
- LC-MS/MS  
This test was developed and its performance characteristics  
determined by [facility]. It has not been cleared or  
approved by the [place] Food and Drug Administration. This test was  
performed in a CLIA certified laboratory and is intended for  
clinical purposes.  
Performed By: [facility]  
[address] [place] [address]  
: Mando Disla MD, PhD  
[place] [id number]  
Domingo Mcrae III, M.D.  
[id number]  
[phone]   
   
                                                            Performed By: #### 2  
00270 ####  
[address]   
[address]   
   
                                                    T3, FREE [CCL]on 2023   
   
                      Free T3 [Mass/Vol] 3.2 pg/mL  Normal     2.3-4.1    [facility]  
   
                                        Comment on above:   Result Comment: [place] [id number]  
Domingo Mcrae III, M.D.  
[id number]  
[phone]   
   
                                                            Performed By: #### 2  
13525 ####  
[address]   
[address]   
   
                                                    THYROID PEROXIDASE AB [CCL]o  
n 2023   
   
                      TPO Antibody <3.0       Normal     <5.6       [facility]  
   
                                        Comment on above:   Result Comment: Thyr  
oid Peroxidase Antibody test is used as an  
 aid   
in diagnosis of  
autoimmune thyroid disease. Clinical correlation is required.  
[address] [address]  
Domingo Mcrae III, M.D.  
[id number]  
[phone]   
   
                                                            Performed By: #### 2  
16712 ####  
[address]   
[address]   
   
                                                    CBC + DIFFon 02-   
   
                          Baso #       0.00 x10EE3/UL Normal       0.00 -   
0.10                                    [facility]  
   
                                        Comment on above:   Performed By: #### 2  
04041 ####  
[facility],[address] [place] [address]   
[address]   
   
                                                    Basophils/100 WBC   
(Bld)           0.5 %           Normal          0.0 - 2.0       [facility]  
   
                                        Comment on above:   Performed By: #### 2  
17549 ####  
[facility],[address] [place], [place] [id number]   
   
                      CBC + DIFF            Normal                [facility]  
   
                                        Comment on above:   Result Comment: CBC-  
COMPLETE BLOOD COUNT   
   
                                                            Performed By: #### 2  
02293 ####  
[facility],[address] [place] [address]   
   
                          EO #         0.10 x10EE3/UL Normal       0.00 -   
0.50                                    [facility]  
   
                                        Comment on above:   Performed By: #### 2  
61003 ####  
[address]   
[address]   
   
                                                    Eosinophils/100   
WBC (Bld)       2.5 %           Normal          0.0 - 7.0       [facility]  
   
                                        Comment on above:   Performed By: #### 2  
02589 ####  
[facility],[address] [place] [address]   
   
                                                    Erythrocyte   
distribution width   
(RBC) [Ratio]       13.2 %              Normal              12.0 -   
15.6                                    [facility]  
   
                                        Comment on above:   Performed By: #### 2  
60479 ####  
[facility],[address] [place] [address]   
   
                                                    Hematocrit (Bld)   
[Volume fraction]   42.7 %              Normal              34.0 -   
46.0                                    [facility]  
   
                                        Comment on above:   Performed By: #### 2  
07546 ####  
[facility],[address] [place] [address]   
[address]   
   
                                                    Hemoglobin (Bld)   
[Mass/Vol]          14.4 g/dL           Normal              12.0 -   
16.0                                    [facility]  
   
                                        Comment on above:   Performed By: #### 2  
66500 ####  
[facility],[address] [place], [place] [phone]   
   
                          Lymph #      2.10 x10EE3/UL Normal       0.80 -   
2.80                                    [facility]  
   
                                        Comment on above:   Performed By: #### 2  
05895 ####  
[facility],[address] [place] [address]   
   
                                                    Lymphocytes/100   
WBC (Bld)           34.9 %              Normal              20.0 -   
45.0                                    [facility]  
   
                                        Comment on above:   Performed By: #### 2  
95759 ####  
[facility],[address] [place] [address]   
   
                      MANUAL DIFF N/A        Normal                [facility]  
   
                                        Comment on above:   Performed By: #### 2  
40072 ####  
[facility],[address] [place] [address]   
   
                                                    MCH (RBC) [Entitic   
mass]           32 pg           Normal          27 - 33         [facility]  
   
                                        Comment on above:   Performed By: #### 2  
34046 ####  
[place] [address]   
   
                      MCHC       34 X10 3   Normal     32 - 36    [facility]  
   
                                        Comment on above:   Performed By: #### 2  
86507 ####  
[facility],[address] [place] [address]   
   
                                                    MCV (RBC) [Entitic   
vol]            95 fL           Normal          80 - 99         [facility]  
   
                                        Comment on above:   Performed By: #### 2  
43409 ####  
[facility],[address] [place] [address]   
   
                          Mono #       0.50 x10EE3/UL Normal       0.20 -   
1.00                                    [facility]  
   
                                        Comment on above:   Performed By: #### 2  
93729 ####  
[place] [id number]   
   
                      MONOS %    8.8 %      Normal     0.0 - 10.0 [facility]  
   
                                        Comment on above:   Performed By: #### 2  
65899 ####  
[facility],[address] [place], [place] [phone]   
   
                                                    Morphology Jean-Claude   
(Bld) [Interp]  N/A             Normal                          [facility]  
   
                                        Comment on above:   Result Comment: {CD]  
   
   
                                                            Performed By: #### 2  
92570 ####  
[facility],[address] [place] [address]   
[address]   
   
                          Neut #       3.10 x10EE3/UL Normal       1.50 -   
7.10                                    [facility]  
   
                                        Comment on above:   Performed By: #### 2  
94169 ####  
[facility],[address] [place] [address]   
[address]   
   
                                                    Neutrophils/100   
WBC (Bld)           53.3 %              Normal              46.0 -   
76.0                                    [facility]  
   
                                        Comment on above:   Performed By: #### 2  
87156 ####  
[facility],[address] [place] [address]   
[address]   
   
                      PLATELET   263 x10EE3/UL Normal     150 - 450  [facility]  
   
                                        Comment on above:   Performed By: #### 2  
91424 ####  
[address]   
[address]   
   
                                                    Platelet mean   
volume (Bld)   
[Entitic vol]   8.8 fL          Normal          6.6 - 10.5      [facility]  
   
                                        Comment on above:   Result Comment: AUTO  
MATED DIFFERENTIAL   
   
                                                            Performed By: #### 2  
17342 ####  
[facility],[address] [place] [address]   
[address]   
   
                          RBC          4.51 x 10EE6/UL Normal       4.10 -   
5.30                                    [facility]  
   
                                        Comment on above:   Performed By: #### 2  
59299 ####  
[facility],[address] [place] [address]   
[address]   
   
                      WBC        5.9 x 10EE3/UL Normal     4.5 - 10.8 [facility]  
   
                                        Comment on above:   Performed By: #### 2  
34529 ####  
[facility],[address] [place] [address]   
[address]   
   
                                                    CMP with eGFRon 02-   
   
                      AGE        47 years   Normal                [facility]  
   
                                        Comment on above:   Performed By: #### 2  
91192 ####  
[facility],[address] [place] [address]   
[address]   
   
                      Albumin [Mass/Vol] 3.9 g/dL   Normal     3.4 - 5.0  [facility]  
   
                                        Comment on above:   Performed By: #### 2  
74548 ####  
[facility],[address] [place] [address]   
[address]   
   
                                                    Albumin/Globulin   
[Mass ratio]    1.1 {ratio}     Normal          0.9 - 1.6       [facility]  
   
                                        Comment on above:   Performed By: #### 2  
23359 ####  
[facility],[address] [place] [address]   
[address]   
   
                      ALK PHOS   96 U/L     Normal     46 - 116   [facility]  
   
                                        Comment on above:   Performed By: #### 2  
11238 ####  
[facility],[address] [place] [address]   
[address]   
   
                                                    ALT [Catalytic   
activity/Vol]   31 U/L          Normal          14 - 59         [facility]  
   
                                        Comment on above:   Performed By: #### 2  
06174 ####  
[facility],[address] [place] [address]   
[address]   
   
                                                    Anion gap   
[Moles/Vol]     14 mmol/L       Normal          10 - 20         [facility]  
   
                                        Comment on above:   Performed By: #### 2  
89983 ####  
[facility],[address] [place] [address]   
[address]   
   
                                                    AST [Catalytic   
activity/Vol]   23 U/L          Normal          13 - 39         [facility]  
   
                                        Comment on above:   Performed By: #### 2  
70442 ####  
[facility],[address] [place] [address]   
[address]   
   
                      B/C RATIO  24 ratio   Normal     0 - 30     [facility]  
   
                                        Comment on above:   Performed By: #### 2  
75385 ####  
[facility],[address] [place] [address]   
[address]   
   
                                                    Bilirubin   
[Mass/Vol]      0.3 mg/dL       Normal          0.2 - 1.0       [facility]  
   
                                        Comment on above:   Performed By: #### 2  
33731 ####  
[facility],[address] [place] [address]   
[address]   
   
                      Calcium [Mass/Vol] 9.2 mg/dL  Normal     8.5 - 10.1 [facility]  
   
                                        Comment on above:   Performed By: #### 2  
10114 ####  
[facility],[address] [place], [place] [phone]   
   
                                                    Chloride   
[Moles/Vol]     103 mmol/L      Normal          98 - 107        [facility]  
   
                                        Comment on above:   Performed By: #### 2  
49857 ####  
[facility],[address] [place] [address]   
[address]   
   
                      CMP with eGFR            Normal                [facility]  
   
                                        Comment on above:   Result Comment: COMP  
REHENSIVE METABOLIC PANEL   
   
                                                            Performed By: #### 2  
90861 ####  
[facility],[address] [place] [address]   
   
                          CO2 [Moles/Vol] 28.6 mmol/L  Normal       21.0 -   
32.0                                    [facility]  
   
                                        Comment on above:   Performed By: #### 2  
69007 ####  
[facility],[address] [place], [place] [phone]   
   
                                                    Creatinine   
[Mass/Vol]          0.75 mg/dL          Normal              0.55 -   
1.02                                    [facility]  
   
                                        Comment on above:   Performed By: #### 2  
56462 ####  
[facility],[address] [place] [address]   
[address]   
   
                                                    GFR/1.73 sq   
M.predicted among   
non-blacks MDRD   
(S/P/Bld) [Vol   
rate/Area]      mL/min/{1.73_m2} Normal          60 - 999        [facility]  
   
                                        Comment on above:   Performed By: #### 2  
43615 ####  
[facility],[address] [place], [place] [phone]   
   
                                                            Result Comment: ACCO  
RDING TO THE NATIONAL KIDNEY DISEASE EDUCATION   
PROGRAM(NKDE), A NORMAL eGFR  
IS A VALUE GREATER THAN OR EQUAL TO 60 ML/MIN/1.73 SQ METERS.  
CHRONIC KIDNEY DISEASE: <60mL/MIN/1.73 SQ METERS  
KIDNEY FAILURE: <15mL/MIN/1.73 SQ METERS  
THIS TEST SHOULD ONLY BE USED FOR PATIENTS 18 YEARS OF AGE AND   
OLDER.   
   
                                                    Globulin (S)   
[Mass/Vol]      3.5 g/dL        Normal          1.5 - 3.8       [facility]  
   
                                        Comment on above:   Performed By: #### 2  
78426 ####  
[facility],[address] [place] [address]   
[address]   
   
                      Glucose [Mass/Vol] 81 mg/dL   Normal     74 - 106   [facility]  
   
                                        Comment on above:   Performed By: #### 2  
36322 ####  
[facility],[address] [place] [address]   
[address]   
   
                                                    Potassium   
[Moles/Vol]     4.2 mmol/L      Normal          3.5 - 5.1       [facility]  
   
                                        Comment on above:   Performed By: #### 2  
40367 ####  
[facility],[address] [place] [address]   
[address]   
   
                      Protein [Mass/Vol] 7.4 g/dL   Normal     6.4 - 8.2  [facility]  
   
                                        Comment on above:   Performed By: #### 2  
55792 ####  
[facility],[address] [place] [address]   
[address]   
   
                      Sodium [Moles/Vol] 141 mmol/L Normal     136 - 145  [facility]  
   
                                        Comment on above:   Performed By: #### 2  
42888 ####  
[facility],[address] [place] [address]   
[address]   
   
                                                    Urea nitrogen   
[Mass/Vol]      18 mg/dL        Normal          7 - 18          [facility]  
   
                                        Comment on above:   Performed By: #### 2  
98462 ####  
[facility],[address] [place] [address]   
[address]   
   
                                                    T4-FREE (FREE THYROXINE)on 0  
2-   
   
                          Free T4 [Mass/Vol] 0.79 ng/dL   Normal       0.76 -   
1.46                                    [facility]  
   
                                        Comment on above:   Result Comment: ***P  
otential of falsely elevated results when   
biotin concentrations are > 10  
ng/mL.***   
   
                                                            Performed By: #### 2  
30896 ####  
[facility],[address] [place] [address]   
[address]   
   
                                                    TSHon 02-   
   
                          TSH Qn       2.07 m[IU]/L Normal       0.35 -   
3.74                                    [facility]  
   
                                        Comment on above:   Performed By: #### 2  
79490 ####  
[facility],[address] [place] [address]   
[address]   
   
                                                    3D MAMM DIGITAL LT SPOT VIEW  
Son 2023   
   
                                                    3D MAMM DIGITAL LT   
SPOT VIEWS                              [address] [address]  
Ph: [phone] Fax:   
[phone]  
Patient: MAGGY CARPIO  
Phone#: [phone] :   
1975 Age: 47 Gender: F  
MRN: [id number] Pt. Type: Out  
Account: [id number] Location:  
Ordering: SHANI SANCHEZ   
Exam Date: 2023/16:06  
Family Phys: Charge Code:   
[id number]  
Physician: Pike Order #:   
[id number]  
Dose#:  
  
  
  
PROCEDURE: LEFT BREAST   
SPOT/MAG TOMOSYNTHESIS   
MAMMOGRAM WITH CAD  
  
COMPARISON: [facility], 3D BILAT   
SCREEN, 2023, 14:57.  
  
INDICATIONS: Abnormal   
mammogram.  
  
BREAST COMPOSITION:   
Heterogeneously dense, which   
may obscure small masses.  
  
FINDINGS:  
DIAGNOSTIC CATEGORY   
0--INCOMPLETE ASSESSMENT:   
NEED ADDITIONAL IMAGING  
EVALUATION.  
  
LEFT BREAST: FOCAL ASYMMETRY   
(finding without convex   
borders usually visible on   
two  
orthogonal views),   
characterized by vaguely   
marginated slight increase   
in density  
corresponding to the   
previously identified focus   
at the inferior medial left   
breast. Further  
evaluation by ultrasound   
will be performed.  
  
RECOMMENDATIONS:  
ULTRASOUND: LEFT BREAST   
--same day ultrasound and   
provide an additional   
report.  
  
PLEASE NOTE: A NORMAL   
MAMMOGRAM DOES NOT EXCLUDE   
THE POSSIBILITY OF BREAST   
CANCER.  
A CLINICALLY SUSPICIOUS   
PALPABLE LUMP SHOULD BE   
BIOPSIED.  
  
THIS FACILITY UTILIZES A   
REMINDER SYSTEM TO ENSURE   
THAT ALL PATIENTS RECEIVE   
REMINDER  
LETTERS FOR APPOINTMENTS.   
THIS INCLUDES REMINDERS FOR   
ROUTINE MAMMOGRAMS,  
DIAGNOSITC MAMMOGRAMS, OR   
OTHER BREAST IMAGING   
INTERVENTIONS WHEN   
APPROPRIATE.  
THIS PATIENT WILL BE PLACED   
IN THE APPROPRIATE REMINDER   
SYSTEM.  
  
  
Dictated by: Peg Kc MD on 2023 at 17:32  
Approved by: Peg Kc MD on 2023 at 17:36 Normal                                  [facility]  
   
                                                    US BREAST LT UNILATERAL COMP  
LETEon 2023   
   
                                                    US BREAST LT   
UNILATERAL   
COMPLETE                                [address] [address]  
Ph: [phone] Fax:   
[phone]  
Patient: MAGGY CARPIO  
Phone#: [phone] :   
1975 Age: 47 Gender: F  
MRN: [id number] Pt. Type: Out  
Account: [id number] Location:  
Ordering: [employer] [facility]   
Exam Date: 2023/16:22  
Family Phys: Charge Code:   
[id number]  
Physician: Pike Order #:   
[id number]  
Dose#:  
  
  
  
PROCEDURE: ULTRASOUND BREAST   
LT  
  
COMPARISON: None.  
  
INDICATIONS: Abnormal   
mammogram.  
  
TECHNIQUE: Breast ultrasound   
was performed, with   
evaluation focusing on all   
four quadrants.  
  
FINDINGS:  
DIAGNOSTIC CATEGORY   
3--PROBABLY BENIGN FINDING.   
THE FOLLOWING FINDING(S) HAS  
A HIGH PROBABILITY OF A   
BENIGN ETIOLOGY:  
  
LEFT BREAST: Complicated,   
likely benign cyst,   
hypoechoic echotexture,   
mid-breast depth, 8  
o'clock position, and 6 x 7   
x 3 mm size. Short-term   
follow-up evaluation by  
ultrasound is   
recommended.The finding   
correlates with the   
mammogram  
finding.  
  
RECOMMENDATIONS:  
SHORT TERM FOLLOW-UP   
ULTRASOUND LEFT BREAST IN 6   
MONTHS.  
  
PLEASE NOTE: A NORMAL   
MAMMOGRAM DOES NOT EXCLUDE   
THE POSSIBILITY OF BREAST   
CANCER.  
A CLINICALLY SUSPICIOUS   
PALPABLE LUMP SHOULD BE   
BIOPSIED.  
  
  
Dictated by: Peg Kc MD on 2023 at 17:36  
Approved by: Peg Kc MD on 2023 at 17:39 Normal                                  [facility] [place]  
   
                                                    3D MAMM BILAT SCREENon    
   
                                                    3D MAMM BILAT   
SCREEN                                  [address] [address]  
Ph: [phone] Fax:   
[phone]  
Patient: MAGGY CARPIO  
Phone#: [phone] :   
1975 Age: 47 Gender: F  
MRN: [id number] Pt. Type: Out  
Account: [id number] Location:  
Ordering: [employer]ANCHEZ   
Exam Date: 2023/14:57  
Family Phys: JOSE SLOAN Charge Code: [id number]  
Physician: Pike Order #:   
[id number]  
Dose#:  
  
  
  
PROCEDURE: BILATERAL   
SCREENING BREAST   
TOMOSYNTHESIS MAMMOGRAM WITH   
CAD  
  
COMPARISON: Outside   
institution 10/7/2014,   
2017 and 2018.  
  
INDICATIONS: Screening.  
  
BREAST COMPOSITION:   
Heterogeneously dense, which   
may obscure small masses.  
  
FINDINGS:  
DIAGNOSTIC CATEGORY   
0--INCOMPLETE ASSESSMENT:   
NEED ADDITIONAL IMAGING  
EVALUATION.  
  
RIGHT BREAST: No significant   
suspicious finding. No   
significant change has   
occurred.  
  
LEFT BREAST: ASYMMETRY   
visible on only the oblique   
view, located in the   
inferior breast, at  
the mid-breast depth, with   
size of approximately 10x11   
mm. This may correspond to   
an  
asymmetry in the medial   
breast measuring 0.6 cm.  
  
RECOMMENDATIONS:  
ADDITIONAL MAMMOGRAPHIC   
VIEWS REQUIRED: LEFT BREAST   
--We will call the patient  
back for additional views   
and issue an additional   
report.  
  
PLEASE NOTE: A NORMAL   
MAMMOGRAM DOES NOT EXCLUDE   
THE POSSIBILITY OF BREAST   
CANCER.  
A CLINICALLY SUSPICIOUS   
PALPABLE LUMP SHOULD BE   
BIOPSIED.  
  
THIS FACILITY UTILIZES A   
REMINDER SYSTEM TO ENSURE   
THAT ALL PATIENTS RECEIVE   
REMINDER  
LETTERS FOR APPOINTMENTS.   
THIS INCLUDES REMINDERS FOR   
ROUTINE MAMMOGRAMS,  
DIAGNOSITC MAMMOGRAMS, OR   
OTHER BREAST IMAGING   
INTERVENTIONS WHEN   
APPROPRIATE.  
THIS PATIENT WILL BE PLACED   
IN THE APPROPRIATE REMINDER   
SYSTEM.  
  
  
Dictated by: Nicci Owens MD   
on 2023 at 14:33  
Approved by: Nicci Owens MD   
on 2023 at 14:40 Normal                                  [facility] [place]  
   
                                                    Laboratory - Chemistry and C  
hemistry - challengeOrdered By: Dr. Joiner on   
2022   
   
                                                    HCG (pregnancy   
test) Ql (U)    Negative                                        [facility]  
   
                                        Comment on above:   Very dilute urine sp  
ecimens, as indicated by a low   
specificgravity,   
may not contain representative levels of hCG. If pregnancy is still   
suspected, a first morning urinespecimen should be collected 48   
hours later and tested.   
   
                                                    Bacteria identified Anaer cx  
 Nom (Unsp spec)Ordered By: Dr. Valero on 2022   
   
                      Anaerobic Culture Actinomyces species                       
  [facility]  
   
                                                    Bacteria identified Cx Nom (  
Wound)Ordered By: Dr. Valero on 2022   
   
                      Wound Culture Actinomyces canis                       [facility]  
   
                                                    Gram stain for investigation  
 of transfusion reactionOrdered By: Dr. Valero on   
2022   
   
                                                    Microscopic   
observation Gram   
stain Nom (Unsp   
spec)                                                           [facility]  
   
                                                    PELVIC US WHIon 2022   
   
                                                                  [facility] with eGFRon 2022   
   
                      AGE        47 years   Normal                [facility] [place]  
   
                                        Comment on above:   Performed By: #### 2  
[id number] ####  
[facility] [place],[address] [place] [address]   
[id number]   
   
                                                    Anion gap   
[Moles/Vol]     8 mmol/L        Low             10 - 20         [facility] [place]  
   
                                        Comment on above:   Performed By: #### 2  
[id number] ####  
[facility] [place],[address] [place] [address]   
[id number]   
   
                      BMP with eGFR            Normal                [facility]  
   
                                        Comment on above:   Result Comment: BASI  
C METABOLIC PANEL   
   
                                                            Performed By: #### 2  
[id number] ####  
[facility] [place],[address] [place] [address]   
[id number]   
   
                      Calcium [Mass/Vol] 8.9 mg/dL  Normal     8.5 - 10.1 [facility]  
   
                                        Comment on above:   Performed By: #### 2  
32404 ####  
[facility] [place],[address] [place] [address]   
[id number]   
   
                                                    Chloride   
[Moles/Vol]     101 mmol/L      Normal          98 - 107        [facility] [place]  
   
                                        Comment on above:   Performed By: #### 2  
82938 ####  
[facility] [place],[address] [place] [address]   
[id number]   
   
                          CO2 [Moles/Vol] 29.7 mmol/L  Normal       21.0 -   
32.0                                    [facility] [place]  
   
                                        Comment on above:   Performed By: #### 2  
76894 ####  
[facility] [place],[address] [place] [address]   
[address]   
   
                                                    Creatinine   
[Mass/Vol]          0.85 mg/dL          Normal              0.55 -   
1.02                                    [facility]  
   
                                        Comment on above:   Performed By: #### 2  
60793 ####  
[facility] [place],[address] [place] [address]   
[address]   
   
                                                    GFR/1.73 sq   
M.predicted among   
non-blacks MDRD   
(S/P/Bld) [Vol   
rate/Area]      mL/min/{1.73_m2} Normal          60 - 999        [facility] [place]  
   
                                        Comment on above:   Performed By: #### 2  
42109 ####  
[facility] [place],[address] [place] [address]   
   
                                                            Result Comment: ACCO  
RDING TO THE NATIONAL KIDNEY DISEASE EDUCATION   
PROGRAM(NKDE), A NORMAL eGFR  
IS A VALUE GREATER THAN OR EQUAL TO 60 ML/MIN/1.73 SQ METERS.  
CHRONIC KIDNEY DISEASE: <60mL/MIN/1.73 SQ METERS  
KIDNEY FAILURE: <15mL/MIN/1.73 SQ METERS  
THIS TEST SHOULD ONLY BE USED FOR PATIENTS 18 YEARS OF AGE AND   
OLDER.   
   
                      Glucose [Mass/Vol] 81 mg/dL   Normal     74 - 106   [facility]  
   
                                        Comment on above:   Performed By: #### 2  
19747 ####  
[place] [address]   
   
                                                    Potassium   
[Moles/Vol]     4.1 mmol/L      Normal          3.5 - 5.1       [facility] [place]  
   
                                        Comment on above:   Performed By: #### 2  
34398 ####  
[place] [address]   
   
                      Sodium [Moles/Vol] 135 mmol/L Low        136 - 145  [facility]  
   
                                        Comment on above:   Performed By: #### 2  
87776 ####  
[place] [address]   
   
                                                    Urea nitrogen   
[Mass/Vol]      17 mg/dL        Normal          7 - 18          [facility]  
   
                                        Comment on above:   Performed By: #### 2  
05582 ####  
[place] [address]   
   
                                                    CBC + DIFFon 2022   
   
                          Baso #       0.00 x10EE3/UL Normal       0.00 -   
0.10                                    [facility]  
   
                                        Comment on above:   Performed By: #### 2  
38658 ####  
[place] [id number]   
   
                                                    Basophils/100 WBC   
(Bld)           0.5 %           Normal          0.0 - 2.0       [facility] [place]  
   
                                        Comment on above:   Performed By: #### 2  
79086 ####  
[place] [address]   
   
                      CBC + DIFF            Normal                [facility] [place]  
   
                                        Comment on above:   Result Comment: CBC-  
COMPLETE BLOOD COUNT   
   
                                                            Performed By: #### 2  
42975 ####  
[facility] [place],[address] [place] [address]   
[id number]   
   
                          EO #         0.20 x10EE3/UL Normal       0.00 -   
0.50                                    [facility] [place]  
   
                                        Comment on above:   Performed By: #### 2  
68920 ####  
[facility] [place],[address] [place], [place] [id number]   
   
                                                    Eosinophils/100   
WBC (Bld)       2.7 %           Normal          0.0 - 7.0       [facility]  
   
                                        Comment on above:   Performed By: #### 2  
93658 ####  
[facility] [place],[address] [place], [place] [id number]   
   
                                                    Erythrocyte   
distribution width   
(RBC) [Ratio]       12.9 %              Normal              12.0 -   
15.6                                    [facility] [place]  
   
                                        Comment on above:   Performed By: #### 2  
97270 ####  
[facility] [place],[address] [place], [place] [id number]   
   
                                                    Hematocrit (Bld)   
[Volume fraction]   41.9 %              Normal              34.0 -   
46.0                                    [facility] [place]  
   
                                        Comment on above:   Performed By: #### 2  
84994 ####  
[facility] [place],[address] [place] [address]   
[address]   
   
                                                    Hemoglobin (Bld)   
[Mass/Vol]          14.1 g/dL           Normal              12.0 -   
16.0                                    [facility] [place]  
   
                                        Comment on above:   Performed By: #### 2  
22291 ####  
[facility] [place],[address] [place] [address]   
[address]   
   
                          Lymph #      2.90 x10EE3/UL High         0.80 -   
2.80                                    [facility] [place]  
   
                                        Comment on above:   Performed By: #### 2  
36706 ####  
[facility] [place],[address] [place] [address]   
[address]   
   
                                                    Lymphocytes/100   
WBC (Bld)           34.3 %              Normal              20.0 -   
45.0                                    [facility]  
   
                                        Comment on above:   Performed By: #### 2  
55111 ####  
[facility] [place],[address] [place] [address]   
   
                      MANUAL DIFF N/A        Normal                [facility] [place]  
   
                                        Comment on above:   Performed By: #### 2  
55763 ####  
[facility] [place],[address] [place] [address]   
   
                                                    MCH (RBC) [Entitic   
mass]           32 pg           Normal          27 - 33         [facility]  
   
                                        Comment on above:   Performed By: #### 2  
78988 ####  
[facility] [place],[address] [place] [address]   
   
                      MCHC       34 X10 3   Normal     32 - 36    [facility]  
   
                                        Comment on above:   Performed By: #### 2  
25171 ####  
[facility] [place],[address] [place] [address]   
   
                                                    MCV (RBC) [Entitic   
vol]            94 fL           Normal          80 - 99         [facility] [place]  
   
                                        Comment on above:   Performed By: #### 2  
53108 ####  
[facility] [place],[address] [place] [address]   
   
                          Mono #       0.80 x10EE3/UL Normal       0.20 -   
1.00                                    [facility] [place]  
   
                                        Comment on above:   Performed By: #### 2  
14554 ####  
[facility] [place],[address] [place] [address]   
   
                      MONOS %    9.3 %      Normal     0.0 - 10.0 [facility]  
   
                                        Comment on above:   Performed By: #### 2  
40989 ####  
[facility] [place],[address] [place] [address]   
   
                                                    Morphology Jean-Claude   
(Bld) [Interp]  N/A             Normal                          [facility] [place]  
   
                                        Comment on above:   Result Comment: {CD]  
   
   
                                                            Performed By: #### 2  
67249 ####  
[facility] [place],[address] [place] [address]   
   
                          Neut #       4.40 x10EE3/UL Normal       1.50 -   
7.10                                    [facility] [place]  
   
                                        Comment on above:   Performed By: #### 2  
00519 ####  
[facility] [place],[address] [place] [address]   
[address]   
   
                                                    Neutrophils/100   
WBC (Bld)           53.2 %              Normal              46.0 -   
76.0                                    [facility]  
   
                                        Comment on above:   Performed By: #### 2  
35192 ####  
[facility] [place],[address] [place] [address]   
[address]   
   
                      PLATELET   245 x10EE3/UL Normal     150 - 450  [facility]  
   
                                        Comment on above:   Performed By: #### 2  
70827 ####  
[facility] [place],[address] [place] [address]   
[address]   
   
                                                    Platelet mean   
volume (Bld)   
[Entitic vol]   8.7 fL          Normal          6.6 - 10.5      [facility]  
   
                                        Comment on above:   Result Comment: AUTO  
MATED DIFFERENTIAL   
   
                                                            Performed By: #### 2  
36026 ####  
[facility] [place],[address] [place] [address]   
[address]   
   
                          RBC          4.47 x 10EE6/UL Normal       4.10 -   
5.30                                    [facility] [place]  
   
                                        Comment on above:   Performed By: #### 2  
39956 ####  
[facility] [place],[address] [place] [address]   
[address]   
   
                      WBC        8.4 x 10EE3/UL Normal     4.5 - 10.8 [facility]  
   
                                        Comment on above:   Performed By: #### 2  
67309 ####  
[facility] [place],[address] [place] [address]   
[address]   
   
                                                    LIPID PROFILEon 2022   
   
                                                    Cholesterol   
[Mass/Vol]      200 mg/dL       Normal          0 - 240         [facility] [place]  
   
                                        Comment on above:   Performed By: #### 2  
61492 ####  
[facility] [place],[address] [place] [address]   
[address]   
   
                                                    Cholesterol in HDL   
[Mass/Vol]      61 mg/dL        High            40 - 60         [facility] [place]  
   
                                        Comment on above:   Performed By: #### 2  
70335 ####  
[facility] [place],[address] [place] [address]   
[address]   
   
                                                    Cholesterol in LDL   
[Mass/Vol]      126 mg/dL       Normal          0 - 129         [facility] [place]  
   
                                        Comment on above:   Performed By: #### 2  
02609 ####  
[facility] [place],[address] [place] [address]   
[address]   
   
                                                    Cholesterol.total/  
Cholesterol in HDL   
[Mass ratio]    3.3 {ratio}     Normal          0.0 - 5.0       [facility] [place]  
   
                                        Comment on above:   Performed By: #### 2  
16249 ####  
[facility] [place],[address] [place] [address]   
[address]   
   
                      Lipid 1996 panel            Normal                [facility]  
   
                                        Comment on above:   Result Comment: LIPI  
D PROFILE   
   
                                                            Performed By: #### 2  
83453 ####  
[facility] [place],[address] [place] [address]   
[address]   
   
                                                    Triglyceride   
[Mass/Vol]      66 mg/dL        Normal          0 - 150         [facility]  
   
                                        Comment on above:   Performed By: #### 2  
94908 ####  
[address]   
[address]   
   
                                                    TSHon 2022   
   
                          TSH Qn       2.28 m[IU]/L Normal       0.35 -   
3.74                                    [facility] [place]  
   
                                        Comment on above:   Performed By: #### 2  
08611 ####  
[facility] [place],[address] [place] [address]   
[address]   
   
                                                    VITAMIN D, 25 HYDROXYon -   
   
                          VitD         33.30 ng/mL  Normal       30.00 -   
100                                     [facility]  
   
                                        Comment on above:   Result Comment: 25-O  
HD3 indicates both endogenous production   
and   
supplementation. 25-OHD2 is an  
indicator of exogenous sources, such as diet or supplementation.   
Therapy is  
based on measurement of Total 25-OHD, with levels <20 ng/mL   
indicative of  
Vitamin D deficiency, while levels between 20 ng/mL and 30 ng/mL   
suggest  
insufficiency. Optimal levels are >=30ng/mL.  
Vitamin D, 25-OH D3 Not Established  
Vitamin D, 25-OH D2 Not Established   
   
                                                            Performed By: #### 2  
26023 ####  
[facility],[address] [place] [address]   
[address]   
   
                                                    MR FOOT W/O LTon 11-   
   
                                        MR FOOT W/O LT      [address] [place] [address]  
Ph: [phone] Fax:   
[phone]  
  
Patient: MAGGY CARPIO  
Phone#: [phone] :   
1975 Age: 46 Gender: F  
MRN: [id number] Pt. Type: Out  
Account: [id number] Location:  
Ordering: BREANNE POMPEY   
Exam Date: 11/10/2022/7:43  
Family Phys: JOSE SLOAN Charge Code: [id number]  
Physician: Pike Order #:   
[id number]  
Dose#:  
  
  
  
PROCEDURE: MRI FOOT LT   
WITHOUT CONTRAST  
  
COMPARISON: None.  
  
INDICATIONS: Left foot   
stress fracture  
  
TECHNIQUE: A complete   
multi-planar examination was   
performed without contrast.  
  
FINDINGS:  
BONES: There is a   
subchondral cyst at the   
distal aspect of the 2nd   
middle phalanx, series  
100, image 21. Remaining   
osseous structures are   
unremarkable.  
JOINTS: Normal   
interphalangeal,   
tarsometatarsal, and   
metatarsophalangeal joints.  
PLANTAR FASCIA: Normal. No   
tear or surrounding soft   
tissue edema to suggest   
fasciitis.  
EFFUSIONS: There is a trace   
effusion at the calcaneal   
cuboid articulation.  
TENDONS: There is fluid   
surrounding the flexor   
digitorum longus and flexor   
hallucis longus  
tendons consistent with   
tenosynovitis, series 100,   
image 12 and series 5 image  
51. The fluid is seen   
surrounding the tendon at   
the level of the midfoot.  
OTHER: No other significant   
findings.  
  
CONCLUSION:  
1. Tenosynovitis of the   
flexor digitorum longus and   
flexor hallucis longus   
tendons.  
  
  
Dictated by: Nicci Owens MD   
on 2022 at 19:39  
Approved by: Nicci Owens MD   
on 2022 at 20:00 Normal                                  [facility]  
   
                                                    FOOT COMPLETE RTon 10-  
2   
   
                                        FOOT COMPLETE RT    [address] [address]  
Ph: [phone] Fax:   
[phone]  
  
Patient: MAGGY CARPIO  
Phone#: [phone] :   
1975 Age: 46 Gender: F  
MRN: [id number] Pt. Type: Out  
Account: [id number] Location:  
Ordering: [facility] Exam   
Date: 10/28/2022/12:54  
Family Phys: JOSE SLOAN Charge Code: [id number]  
Physician: Pike Order #:   
[id number]  
Dose#:  
  
  
  
  
PROCEDURE: X-RAY FOOT RT   
COMPLETE MIN 3 VIEWS  
  
COMPARISON: [facility], , FOOT RT   
COMPLETE, 2022, 13:45.  
  
INDICATIONS: Plantar fascial   
fibromatosis.  
  
FINDINGS:  
BONES: A small calcaneal   
spur is present. There is no   
evidence of acute bone   
abnormality.  
SOFT TISSUES: Negative. No   
visible soft tissue   
swelling.  
EFFUSION: None visible.  
OTHER: Negative.  
  
CONCLUSION: No acute   
disease. No significant   
change has occurred.  
  
  
Dictated by: Peg Kc MD on 10/28/2022 at 13:43  
Approved by: Peg Kc MD on 10/28/2022 at 13:44 Normal                                  [facility]  
   
                                                    FOOT COMPLETE LTon 10-  
2   
   
                                        FOOT COMPLETE LT    [address] [address]  
Ph: [phone] Fax:   
[phone]  
  
Patient: MAGGY CARPIO  
Phone#: [phone] :   
1975 Age: 46 Gender: F  
MRN: [id number] Pt. Type: Out  
Account: [id number] Location:  
Ordering: [facility] Exam   
Date: 10/13/2022/15:37  
Family Phys: JOSE SLOAN Charge Code: [id number]  
Physician: Pike Order #:   
[id number]  
Dose#:  
  
  
  
  
PROCEDURE: X-RAY FOOT LT   
COMPLETE MIN 3 VIEWS  
  
COMPARISON: None.  
  
INDICATIONS: Pain in left   
foot.  
  
FINDINGS:  
BONES: Normal. No   
significant arthropathy or   
acute abnormality. Small os   
trigonum  
measuring 0.6 x 0.4 cm  
SOFT TISSUES: Negative. No   
visible soft tissue   
swelling.  
EFFUSION: None visible.  
OTHER: Negative.  
  
CONCLUSION:  
1. Unremarkable left foot   
radiograph  
  
  
Dictated by: Nicci Owens MD   
on 10/13/2022 at 17:12  
Approved by: Nicci Owens MD   
on 10/13/2022 at 17:13 Normal                                  [facility]  
   
                                                    MR ANKLE WO RTon 2022   
   
                                        MR ANKLE WO RT      [address] [address]  
Ph: [phone] Fax:   
[phone]  
  
Patient: MAGGY CARPIO  
Phone#: [phone] :   
1975 Age: 46 Gender: F  
MRN: [id number] Pt. Type: Out  
Account: [id number] Location:  
Ordering: [facility] Exam   
Date: 2022/7:43  
Family Phys: JOSE SLOAN Charge Code: [id number]  
Physician: Pike Order #:   
[id number]  
Dose#:  
  
  
  
PROCEDURE: MRI ANKLE RT   
WITHOUT CONTRAST  
  
COMPARISON: None.  
  
INDICATIONS: Right ankle   
pain  
  
TECHNIQUE: A complete   
multi-planar examination was   
performed without contrast.  
  
FINDINGS:  
LATERAL LIGAMENTS AND SOFT   
TISSUE STRUCTURES  
TALOFIBULAR: The posterior   
talofibular ligament appears   
wavy likely chronically   
injured.  
The anterior talofibular   
ligament is thin. The all   
superior fibers are wavy,  
likely representing remote   
chronic injury.  
CALCANEOFIBULAR: Normal.  
TIBIOFIBULAR: Normal.  
PERONEAL TENDONS:Normal. No   
subluxation, tendinopathy,   
or tear.  
MEDIAL LIGAMENTS AND SOFT   
TISSUE STRUCTURES  
DELTOID COMPLEX: Normal.  
SPRING LIGAMENT: Normal.  
TARSAL TUNNEL: Normal.  
FLEXORS: Normal.  
OTHER TENDONS  
EXTENSORS: Normal.  
ACHILLES: Normal. No   
surrounding abnormality.  
PLANTAR FASCIA: Is focal   
thickening the medial cord   
of the plantar fascia,   
measuring 0.6 cm.  
There is hyperintense signal   
between the cord and the   
calcaneus  
suggesting partial tear at   
the calcaneal attachment.   
There is mild adjacent  
edema in the subcutaneous   
tissues.  
SINUS TARSI: Normal. No   
edema or synovitis to   
suggest sinus tarsi   
syndrome.  
BONES: There is an os   
trigonum measuring 0.4 x 0.9   
cm  
There is edema in the   
lateral talar dome, series   
18, image 24, with  
irregularity of the   
overlying cartilage.  
EFFUSIONS: Small joint   
effusion at the tibiotalar   
in talar calcaneal   
articulations. Small  
joint effusions are seen   
throughout the midfoot.  
OTHER: No other significant   
findings.  
Continued Report - Page 2 of   
2  
  
Patient: MAGGY CARPIO  
Phone#: [phone] :   
1975 Age: 46 Gender: F  
MRN: [id number] Pt. Type: Out  
Account: [id number] Location:  
Ordering: [facility] Exam   
Date: 2022/7:43  
Family Phys: JOSE SLOAN Charge Code: [id number]  
Physician: Pike Order #:   
[id number]  
Dose#:  
  
  
CONCLUSION:  
1. Plantar fasciitis of the   
medial cord of the plantar   
aponeurosis with partial   
thickness tear at  
the calcaneal attachment.  
2. Anterior and posterior   
talofibular ligaments with   
appearance suggestive of   
chronic injury.  
3. Osteochondral injury of   
the talar dome  
  
  
Dictated by: Nicci Owens MD   
on 2022 at 16:51  
Approved by: Nicci Owens MD   
on 2022 at 17:15 Normal                                  [facility]  
   
                                                    MR FOOT W/O RTon 2022   
   
                                        MR FOOT W/O RT      [place] [address]  
Ph: [phone] Fax:   
[phone]  
  
Patient: MAGGY CARPIO  
Phone#: [phone] :   
1975 Age: 46 Gender: F  
MRN: [id number] Pt. Type: Out  
Account: [id number] Location:  
Ordering: [facility] Exam   
Date: 2022/7:43  
Family Phys: JOSE SLOAN Charge Code: [id number]  
Physician: Pike Order #:   
[id number]  
Dose#:  
  
  
  
PROCEDURE: MRI FOOT RT   
WITHOUT CONTRAST  
  
COMPARISON: None.  
  
INDICATIONS: Right foot pain  
  
TECHNIQUE: A complete   
multi-planar examination was   
performed without contrast.  
  
FINDINGS:  
BONES: In the 2nd metatarsal   
proximally there is a   
hypodense line with   
extensive marrow  
edema consistent with a   
stress fracture. There is T2   
hyperintense signal  
surrounding the 2nd   
metatarsal.  
JOINTS: Normal   
interphalangeal,   
tarsometatarsal, and   
metatarsophalangeal joints.  
PLANTAR FASCIA: The distal   
portion is unremarkable in   
thickness. Please refer to   
MRI ankle report  
performed same day.  
EFFUSIONS: There are small   
effusions in the midfoot.  
TENDONS: Normal. No visible   
tendinitis, tear, or   
tenosynovitis.  
OTHER: No other significant   
findings.  
  
CONCLUSION:  
1. Second metatarsal   
proximal stress fracture.   
This report was communicated   
by telephone to  
Dr. Prabhu Schmidt at the   
dictation time shown below.  
  
  
Dictated by: Nicci Owens MD   
on 2022 at 17:15  
Approved by: Nicci Owens MD   
on 2022 at 17:25 Normal                                  [facility]  
   
                                                    ANKLE COMPLETE RTon 20  
22   
   
                                        ANKLE COMPLETE RT   [address] [address]  
Ph: [phone] Fax:   
[phone]  
  
Patient: MAGGY CARPIO  
Phone#: [phone] :   
1975 Age: 46 Gender: F  
MRN: [id number] Pt. Type: Out  
Account: [id number] Location:  
Ordering: [facility] POMPEY   
Exam Date: 2022/13:43  
Family Phys: JOSE SLOAN Charge Code: [id number]  
Physician: Pike Order #:   
[id number]  
DLP Dose#:  
  
  
  
  
PROCEDURE: X-RAY ANKLE   
COMPLETE RT MIN 3 VIEWS  
  
COMPARISON: None.  
  
INDICATIONS: Pain.  
  
FINDINGS:  
BONES: Bony   
demineralization. No   
significant arthropathy or   
acute abnormality. There is   
an  
os trigonum measuring 0.6 x   
1.0 cm. There is a small   
calcaneal spur measuring 0.4  
cm. There is enthesopathy at   
the Achilles attachment.  
SOFT TISSUES: Mild soft   
tissue swelling  
EFFUSION: None visible.  
OTHER: Negative.  
  
CONCLUSION:  
1. No acute osseous   
abnormality  
2. Calcaneal spurring.  
  
  
Dictated by: Nicci Owens MD   
on 2022 at 15:26  
Approved by: Nicci Owens MD   
on 2022 at 15:27 Normal                                  [facility]  
   
                                                    FOOT COMPLETE RTon   
2   
   
                                        FOOT COMPLETE RT    [address] [address]  
Ph: [phone] Fax:   
[phone]  
  
Patient: MAGGY CARPIO  
Phone#: [phone] :   
1975 Age: 46 Gender: F  
MRN: [id number] Pt. Type: Out  
Account: [id number] Location:  
Ordering: BRE[facility]NE POMPEY   
Exam Date: 2022/13:45  
Family Phys: JOSE SLOAN Charge Code: [id number]  
Physician: Pike Order #:   
[id number]  
DLP Dose#:  
  
  
  
  
PROCEDURE: X-RAY FOOT RT   
COMPLETE MIN 3 VIEWS  
  
COMPARISON: None.  
  
INDICATIONS: Pain.  
  
FINDINGS:  
BONES: No significant   
arthropathy or acute   
abnormality. Calcaneal spur   
measures 0.5 cm.  
Os trigonum is present.  
SOFT TISSUES: Negative. No   
visible soft tissue   
swelling.  
EFFUSION: None visible.  
OTHER: Negative.  
  
CONCLUSION:  
1. Calcaneal spur  
  
  
Dictated by: Nicci Owens MD   
on 2022 at 15:27  
Approved by: Nicci Owens MD   
on 2022 at 15:29 Normal                                  [facility]  
   
                                                    HEP B SURFACE AB, QUANT [CCL  
]on 2022   
   
                                                    HepB   
SurfaceAb,Quant 158.32 mIU/mL   Normal          >=12.00         [facility]  
   
                                        Comment on above:   Result Comment: Thes  
e results are consistent with previous   
exposure   
and/or immunity to  
the hepatitis B virus antigen.  
[place] [address]  
Domingo Mcrae III, M.D.  
[id number]  
[phone]   
   
                                                            Performed By: #### 2  
[id number] ####  
[address]   
[address]   
   
                                                    MEASLES IGG ANTIBODY [CCL]on  
 2022   
   
                      Measles IgG, Qual Positive   Normal     Positive   [facility]  
   
                                        Comment on above:   Result Comment: The   
result suggests recent or past exposure to  
   
Measles virus or Measles  
vaccination. The current test does not detect neutralizing   
antibodies.  
Positive result may also be seen due to presence of  
passively-transferred antibodies. Please correlate with patient's  
history.  
[place] [address] [place] [id number]  
Domingo Mcrae III, M.D.  
[id number]  
[phone]   
   
                                                            Performed By: #### 2  
[address] ####  
[address]   
[address]   
   
                                                    MUMPS IGG AB [CCL]on   
022   
   
                      Mumps IgG, Qual Positive   Normal     Positive   [facility]  
   
                                        Comment on above:   Result Comment: The   
result suggests recent or past exposure to  
   
Mumps virus or Mumps  
vaccination. The current test does not detect neutralizing   
antibodies.  
Positive result may also be seen due to presence of  
passively-transferred antibodies. Please correlate with patient's  
history.   
[place] [address] [place] [address]  
Domingo Mcrae III, M.D.  
[id number]  
[phone]   
   
                                                            Performed By: #### 2  
[address] ####  
[facility] [place],[address] [place] [address]   
[address]   
   
                                                    RUBELLA IgG ANTIBODY [OLD]on  
 2022   
   
                                                    Rubella IgG Ab,   
Qual            Positive        Normal          Positive        [facility]  
   
                                        Comment on above:   Result Comment: The   
result suggests recent or past exposure to  
   
Rubella virus or history  
of Rubella vaccination. Positive result may also be seen due to  
presence of passively-transferred antibodies. Please correlate with  
patient's history.  
[facility]  
[address] [place] [address]  
Domingo Mcrae III, M.D.  
[id number]  
[phone]   
   
                                                            Performed By: #### 2  
913[address] ####  
[facility] [place],[address] [place] [address]   
[address]   
   
                                                    GLUCOSEon 2022   
   
                      Glucose [Mass/Vol] 90 mg/dL   Normal     74 - 106   [facility]  
   
                                        Comment on above:   Performed By: #### 2  
775[address] ####  
[facility],[address] [place] [address]   
[address]   
   
                                                    LIPID PROFILEon 2022   
   
                                                    Cholesterol   
[Mass/Vol]      232 mg/dL       Normal          0 - 240         [facility]  
   
                                        Comment on above:   Performed By: #### 2  
11159 ####  
[facility],[address] [place] [address]   
[address]   
   
                                                    Cholesterol in HDL   
[Mass/Vol]      64 mg/dL        High            40 - 60         [facility]  
   
                                        Comment on above:   Performed By: #### 2  
64295 ####  
[facility],[address] [place] [address]   
[address]   
   
                                                    Cholesterol in LDL   
[Mass/Vol]      150 mg/dL       High            0 - 129         [facility]  
   
                                        Comment on above:   Performed By: #### 2  
912[address] ####  
[facility],[address] [place] [address]   
[address]   
   
                                                    Cholesterol.total/  
Cholesterol in HDL   
[Mass ratio]    3.6 {ratio}     Normal          0.0 - 5.0       [facility]  
   
                                        Comment on above:   Performed By: #### 2  
[address] ####  
[facility],[address] [place], [place] [id number]   
   
                      Lipid 1996 panel            Normal                [facility]  
   
                                        Comment on above:   Result Comment: LIPI  
D PROFILE   
   
                                                            Performed By: #### 2  
89655 ####  
[facility],[address] [place], [place] [id number]   
   
                                                    Triglyceride   
[Mass/Vol]      88 mg/dL        Normal          0 - 150         [facility]  
   
                                        Comment on above:   Performed By: #### 2  
79882 ####  
[facility],[address] [place] [address]   
   
                                                    URINE COTININE TEST [NEW EMP  
LOYEE]on 2022   
   
                          COTININE     Negative     Normal       NORMAL:   
NEGATIVE                                [facility]  
   
                                        Comment on above:   Result Comment: The   
COT One Step Cotinine Device (Urine)   
yields a   
positve result when  
the Cotinine in urine exceeds 200 ng/mL. A Cotinine concentration  
> 200 ng/mL indicates an active tobacco product user. The window  
of detection for Cotinine in urine at a cutoff level of 200 ng/mL   
is  
expected to be up to 2-3 days after nicotine use.   
   
                                                            Performed By: #### 2  
79228 ####  
[facility],[address] [place] [address]   
   
                                                    M TUBERCULOSIS BY Marilou GRAMAJO 2020   
   
                      M. TB Mitogen-Nil 9.93 IU/mL Normal                [facility]  
   
                                        Comment on above:   Order Comment: The M  
. Tuberculosis antigen levels cannot be   
correlated to stage or degree of infection, response to therapy or   
likelihood for progression to active disease. Results from   
QuantiFERON TB Gold Plus must be used in conjunction with   
individual epidemiological history, current medical status, and   
results of other diagnostic evaluation.   
   
                                                            Performed By: #### Q  
FTB ####  
[facility] (DEFAULT)  
[address] [place] [address]   
   
                      M. TB Nil  0.07 IU/mL Normal                [facility]  
   
                                        Comment on above:   Order Comment: The M  
. Tuberculosis antigen levels cannot be   
correlated to stage or degree of infection, response to therapy or   
likelihood for progression to active disease. Results from   
QuantiFERON TB Gold Plus must be used in conjunction with   
individual epidemiological history, current medical status, and   
results of other diagnostic evaluation.   
   
                                                            Performed By: #### Q  
FTB ####  
[facility] (DEFAULT)  
[address] [address]   
   
                      M. TB TB1-Nil 0.02 IU/mL Normal                [facility]  
   
                                        Comment on above:   Order Comment: The M  
. Tuberculosis antigen levels cannot be   
correlated to stage or degree of infection, response to therapy or   
likelihood for progression to active disease. Results from   
QuantiFERON TB Gold Plus must be used in conjunction with   
individual epidemiological history, current medical status, and   
results of other diagnostic evaluation.   
   
                                                            Performed By: #### Q  
FTB ####  
[facility] (DEFAULT)  
[address] [place] [address]   
   
                      M. TB TB2-Nil 0.00 IU/mL Normal                [facility]  
   
                                        Comment on above:   Order Comment: The M  
. Tuberculosis antigen levels cannot be   
correlated to stage or degree of infection, response to therapy or   
likelihood for progression to active disease. Results from   
QuantiFERON TB Gold Plus must be used in conjunction with   
individual epidemiological history, current medical status, and   
results of other diagnostic evaluation.   
   
                                                            Performed By: #### Q  
FTB ####  
[facility] (DEFAULT)  
[address] [place] [address]   
   
                                                    M. Tuberculosis by   
Quantiferon in   
tube            Negative        Normal          Negative        [facility]  
   
                                        Comment on above:   Order Comment: The M  
. Tuberculosis antigen levels cannot be   
correlated to stage or degree of infection, response to therapy or   
likelihood for progression to active disease. Results from   
QuantiFERON TB Gold Plus must be used in conjunction with   
individual epidemiological history, current medical status, and   
results of other diagnostic evaluation.   
   
                                                            Performed By: #### Q  
FTB ####  
[facility] (DEFAULT)  
[address] [place] [address]   
   
                                                    Office Visiton 2017   
   
                                                    Alcoholism   
counseling   
(procedure)               no                        Invalid   
Interpretation   
Code                                                [facility]  
Work Phone:   
[phone]  
0  
   
                                                    Documentation of   
current   
medications   
(procedure)               Done                      Invalid   
Interpretation   
Code                                                [facility]  
Work Phone:   
[phone]  
0  
   
                                                    Tobacco smoking   
status NHIS               Never                     Invalid   
Interpretation   
Code                                                [facility]  
Work Phone:   
[phone]  
0  
   
                                Tobacco use [facility] Never smoker    Invalid   
Interpretation   
Code                                                [facility]   
[facility]  
Work Phone:   
[phone]  
0  
   
                                                    Office Visiton 2017   
   
                                                    Alcoholism   
counseling   
(procedure)               no                        Invalid   
Interpretation   
Code                                                [facility]  
Work Phone:   
[phone]  
0  
   
                                                    Documentation of   
current   
medications   
(procedure)               Done                      Invalid   
Interpretation   
Code                                                [facility]  
Work Phone:   
[phone]  
0  
   
                      Protein mass conc Done                             [facility]  
Work Phone:   
[phone]  
0  
   
                      Protein mass conc no                               [facility]  
Work Phone:   
[phone]  
0  
   
                                                    Tobacco smoking   
status NHIS     Never                                           [facility]   
[facility]  
Work Phone:   
[phone]  
0  
   
                                                    Tobacco smoking   
status NHIS     Never smoker                                    [facility]   
[facility]  
Work Phone:   
[phone]  
[phone]  
   
                                Tobacco use CPHS Never smoker    Invalid   
Interpretation   
Code                                                [facility]   
[facility]  
Work Phone:   
[phone]  
0  
   
                                                    Office Visiton 2017   
   
                                                    Alcoholism   
counseling   
(procedure)               no                        Invalid   
Interpretation   
Code                                                [facility]   
[facility]  
Work Phone:   
[phone]  
0  
   
                                                    Documentation of   
current   
medications   
(procedure)               Done                      Invalid   
Interpretation   
Code                                                [facility]   
Medicine and   
Orthopaedics  
Work Phone:   
[phone]  
[phone]  
   
                                                    Tobacco smoking   
status NHIS               Never                     Invalid   
Interpretation   
Code                                                [facility]   
[facility]  
Work Phone:   
[phone]  
[phone]  
   
                                Tobacco use CPHS Never smoker    Invalid   
Interpretation   
Code                                                [facility]   
[facility]  
Work Phone:   
[phone]  
[phone]  
   
                                                    Office Visiton 2017   
   
                                                    Alcoholism   
counseling   
(procedure)               no                        Invalid   
Interpretation   
Code                                                [facility]   
[facility]  
Work Phone:   
[phone]  
0  
   
                                                    Documentation of   
current   
medications   
(procedure)               Done                      Invalid   
Interpretation   
Code                                                [facility]   
[facility]  
Work Phone:   
[phone]  
[phone]  
   
                                                    Tobacco smoking   
status NHIS               Never                     Invalid   
Interpretation   
Code                                                [facility]   
[facility]  
Work Phone:   
[phone]  
[phone]  
   
                                Tobacco use CPHS Never smoker    Invalid   
Interpretation   
Code                                                [facility]   
[facility]  
Work Phone:   
[phone]  
[phone]  
   
                                                    Bacteria identified Anaer cx  
 Nom (Unsp spec)   
   
                      Anaerobic Culture Actinomyces species                       
  [facility]  
Work Phone:   
[phone]  
0  
   
                                                    Bacteria identified Cx Nom (  
Wound)   
   
                      Wound Culture Actinomyces canis                       [facility]  
Work Phone:   
[phone]  
0  
   
                                                    Gram stain for investigation  
 of transfusion reaction   
   
                                                    Microscopic   
observation Gram   
stain Nom (Unsp   
spec)                                                           [facility]  
Work Phone:   
[phone]  
0  
  
  
  
Vital Signs  
  
  
                      Date Time  Vital Sign Value      Performing Clinician Faci  
lity  
   
                                                    2024   
15:          Body height         177.8 cm            Dr. Jose Sloan  
Work Phone:   
[phone]                          [facility]  
   
                                                    2024   
15:                              Body mass index   
(BMI) [Ratio]             33.7 kg/m2                Dr. Jose Sloan  
Work Phone:   
[phone]                          [facility]  
   
                                                    2024   
15:          Body weight         106.59 kg           Dr. Jose Sloan  
Work Phone:   
[phone]                          [facility]  
   
                                                    2024   
15:                              Diastolic blood   
pressure                  85 mm[Hg]                 Dr. Jose Sloan  
Work Phone:   
[phone]                          [facility]  
   
                                                    2024   
15:          Respiratory rate    18 /min             Dr. Jose Sloan  
Work Phone:   
[phone]                          [facility]  
   
                                                    2024   
15:                              Systolic blood   
pressure                  127 mm[Hg]                Dr. Jose Sloan  
Work Phone:   
[phone]                          [facility]  
   
                                                    2024   
08:          Body height         177.8 cm            Shani Sanchez MD  
Work Phone:   
[phone]                          [facility]  
   
                                                    2024   
08:          Body weight         110.22 kg           Shani Sanchez MD  
Work Phone:   
[phone]                          [facility]  
   
                                                    2024   
08:                              Diastolic blood   
pressure                  78 mm[Hg]                 Shani Sanchez MD  
Work Phone:   
[phone]                          [facility]  
   
                                                    2024   
08:                              Systolic blood   
pressure                  122 mm[Hg]                Shain Sanchez MD  
Work Phone:   
[phone]                          [facility]  
   
                                                    2024   
15:          Body height         177.8 cm            Dr. Krystian Sloan  
Work Phone:   
[phone]                          [facility]  
   
                                                    2024   
15:                              Diastolic blood   
pressure                  84 mm[Hg]                 Dr. Krystian Sloan  
Work Phone:   
[phone]                          [facility]  
   
                                                    2024   
15:          Respiratory rate    16 /min             Dr. Krystian Sloan  
Work Phone:   
[phone]                          [facility]  
   
                                                    2024   
15:                              Systolic blood   
pressure                  129 mm[Hg]                Dr. Krystian Sloan  
Work Phone:   
[phone]                          [facility]  
   
                                                    2022   
08:          Body temperature    97.8 [degF]         Dr. Krystian Sloan  
Work Phone:   
[phone]                          [facility]  
   
                                                    2022   
08:                              Diastolic blood   
pressure                  81 mm[Hg]                 Dr. Krystian Sloan  
Work Phone:   
[phone]                          [facility]  
   
                                                    2022   
08:          Heart rate          62 /min             Dr. Krystian Sloan  
Work Phone:   
[phone]                          [facility]  
   
                                                    2022   
08:          Respiratory rate    16 /min             Dr. Krystian Sloan  
Work Phone:   
[phone]                          [facility]  
   
                                                    2022   
08:                              SaO2% (BldA) [Mass   
fraction]                 100 %                     Dr. Krystian Sloan  
Work Phone:   
[phone]                          [facility]  
   
                                                    2022   
08:                              Systolic blood   
pressure                  111 mm[Hg]                Dr. Krystian Sloan  
Work Phone:   
[phone]                          [facility]  
   
                                                    2022   
07:          Body height         177.8 cm            Dr. Krystian Sloan  
Work Phone:   
[phone]                          [facility]  
   
                                                    2022   
07:                              Body mass index   
(BMI) [Ratio]             30.3 kg/m2                Dr. Krystian Sloan  
Work Phone:   
[phone]                          [facility]  
   
                                                    2022   
07:          Body weight         95.8 kg             Dr. Krystian Sloan  
Work Phone:   
[phone]                          [facility]  
   
                                                    2022   
09:          Body height         177.8 cm            Dr. Krystian Sloan  
Work Phone:   
[phone]                          [facility]  
Work Phone:   
[phone]  
   
                                                    2022   
09:                              Body mass index   
(BMI) [Ratio]             29.8 kg/m2                Dr. Krystian Sloan  
Work Phone:   
[phone]                          [facility]  
   
                                                    2022   
09:          Body weight         94.34 kg            Dr. Krystian Sloan  
Work Phone:   
[phone]                          [facility]  
   
                                                    10-   
08:          Body height         177.8 cm            Shani Sanchez MD  
Work Phone:   
[phone]                          [facility]  
   
                                                    10-   
08:          Body weight         95.25 kg            Shani Sanchez MD  
Work Phone:   
[phone]                          [facility]  
   
                                                    10-   
08:                              Diastolic blood   
pressure                  76 mm[Hg]                 Shani Sanchez MD  
Work Phone:   
[phone]                          [facility]  
   
                                                    10-   
08:                              Systolic blood   
pressure                  120 mm[Hg]                Shani Sanchez MD  
Work Phone:   
[phone]                          [facility]  
   
                                                    2021   
12:      Body height     177.8 cm                        [facility]  
Work Phone:   
[phone]  
   
                                                    2021   
12:                              Body mass index   
(BMI) [Ratio]       34.2 kg/m2                              [facility]  
Work Phone:   
[phone]  
   
                                                    2021   
12:      Body temperature 97.1 [degF]                     [facility]  
Work Phone:   
[phone]  
   
                                                    2021   
12:      Body weight     108.4 kg                        [facility]  
Work Phone:   
[phone]  
   
                                                    2021   
12:                              Diastolic blood   
pressure            80 mm[Hg]                               [facility]  
Work Phone:   
[phone]  
   
                                                    2021   
12:      Heart rate      104 /min                        [facility]  
Work Phone:   
[phone]  
   
                                                    2021   
12:      Respiratory rate 20 /min                         [facility]  
Work Phone:   
[phone]  
   
                                                    2021   
12:                              SaO2% (BldA) [Mass   
fraction]           98 %                                    [facility]  
Work Phone:   
[phone]  
   
                                                    2021   
12:                              Systolic blood   
pressure            112 mm[Hg]                              [facility]  
Work Phone:   
[phone]  
   
                                                    2015   
16:                              BMI (Body Mass   
Index)              27.61 kg/m2         [facility]   
Sports Medicine and   
Orthopaedics  
Work Phone:   
[phone]  
   
                                                    2015   
16:      Body Temperature 98.3 [degF]     [facility]   
Sports Medicine and   
Orthopaedics  
Work Phone:   
[phone]  
   
                                                    2015   
16:      BP Diastolic    82 mm[Hg]       [facility]   
Sports Medicine and   
Orthopaedics  
Work Phone:   
[phone]  
   
                                                    2015   
16:      BP Systolic     126 mm[Hg]      [facility]   
Sports Medicine and   
Orthopaedics  
Work Phone:   
[phone]  
   
                                                    2015   
16:                              BSA (Body Surface   
Area)               2.01 m2             [facility]   
Sports Medicine and   
Orthopaedics  
Work Phone:   
[phone]  
   
                                                    2015   
16:      Pulse (Heart Rate) 70 /min         [facility]   
Sports Medicine and   
Orthopaedics  
Work Phone:   
[phone]  
   
                                                    2015   
16:      Pulse Oximetry  97 %            [facility]   
Sports Medicine and   
Orthopaedics  
Work Phone:   
[phone]  
   
                                                    2015   
16:140400      Respiratory Rate 16 /min         Jb Diaz [facility]  
ter   
Sports Medicine and   
Orthopaedics  
Work Phone:   
[phone]  
   
                                                    2015   
16:14040      Weight          84.82 kg        Jb Diaz [facility]  
er   
Sports Medicine and   
Orthopaedics  
Work Phone:   
[phone]  
   
                                                    2012   
16:080400      Height          175.26 cm       Jb Diaz [facility]  
er   
Sports Medicine and   
Orthopaedics  
Work Phone:   
[phone]  
  
  
  
Encounters  
  
  
                          Encounter Date Encounter Type Care Provider Facility  
   
                                                    Start: 2024  
End: 2024     ambulatory          [facility]        Facility:[facility]  
   
                                                    Start: 2024  
End: 2024                         Subsequent hospital visit   
by physician              Reinaldo Noland 2                  [facility]  
   
                                        Comment on above:   Encounter for screen  
ing for nutritional disorder   
   
                                                    Start: 2024  
End: 2024           ambulatory                CHRISTOPHER BEHR   
[facility]  
   
                                                    Start: 2024  
End: 2024     [facility]          [facility]  Facility[facility]  
   
                                                    Start: 2024  
End: 2024     ambulatory          [facility]  Facility:[facility]  
   
                                                    Start: 2024  
End: 2024     ambulatory          [facility]  Facility:[facility]  
   
                                        Start: 2024   Encounter for genera  
l   
adult medical examination   
without abnormal findings [facility]        [facility]  
   
                                                    Start: 2024  
End: 2024                         Patient encounter   
procedure                               Dr. Jose Sloan  
Work Phone:   
[phone]                          [facility]  
Work Phone:   
[phone]  
   
                                                    Start: 2024  
End: 2024           ambulatory                Dr. Jose Sloan  
Work Phone:   
[phone]                          [facility]  
Work Phone:   
[phone]  
   
                                Start: 2024 Documentation procedure Mammog  
deloris   
Coordinator                             [facility]   
MAIN  
   
                                Start: 2024 Letter encounter Mammography   
Coordinator                             [facility]   
Department  
   
                                                    Start: 2024  
End: 2024           ambulatory                Dr. Krystian Sloan  
Work Phone:   
[phone]                          [facility]  
Work Phone:   
[phone]  
   
                                                    Start: 2024  
End: 2024                         Patient encounter   
procedure                               Dr. Krystian Sloan  
Work Phone:   
[phone]                          [facility]  
Work Phone:   
[phone]  
   
                                                    Start: 2024  
End: 2024           ambulatory                SHANI SANCHEZ                                Facility:[facility]  
   
                                                    Start: 2024  
End: 2024                         Subsequent hospital visit   
by physician              [facility]     Mammogram  
   
                                        Comment on above:   Encounter for screen  
ing mammogram for breast cancer [Z12.31]   
   
                                                    Start: 2024  
End: 2024           ambulatory                SHANI SANCHEZ                                Facility:[facility]  
   
                                                    Start: 2024  
End: 2024                         Patient encounter   
procedure                               Shani Sanchez MD  
Work Phone:   
[phone]                          OB/Gynecology  
   
                                        Comment on above:   Encounter for gyneco  
logical examination (general) (routine)   
without   
abnormal findings (Primary Dx);  
Encounter for screening mammogram for breast cancer   
   
                                                    Start: 2024  
End: 2024           Patient encounter status  Shani Sanchez MD  
Work Phone:   
[phone]                          [facility]  
   
                                                    Start: 2024  
End: 2024     ambulatory          FAYE VALERO        Facility:[facility]  
   
                                                    Start: 2024  
End: 2024                         Subsequent hospital visit   
by physician                            [facility]  
Work Phone:   
[phone]                          [facility]  
   
                          Start: 2024 ambulatory   Faye Valero Facility  
:[facility]  
   
                                                    Start: 2024  
End: 2024                         Patient encounter   
procedure                               Dr. Krystian Sloan  
Work Phone:   
[phone]                          [facility]  
Work Phone:   
[phone]  
   
                                                    Start: 2024  
End: 2024           ambulatory                Dr. Krystian Sloan  
Work Phone:   
[phone]                          [facility]  
Work Phone:   
[phone]  
   
                                Start: 2024 ambulatory      Shani Sanchez MD  
Work Phone:   
[phone]                          [facility]  
   
                                        Start: 2024   Patient encounter   
procedure                               Shani Sanchez MD  
Work Phone:   
[phone]                          OB/Gynecology  
   
                                        Comment on above:   Schedule Appointment  
   
   
                                                    Start: 2023  
End: 2023           ambulatory                Dr. Krystian Sloan  
Work Phone:   
[phone]                          [facility]  
Work Phone:   
[phone]  
   
                                                    Start: 2023  
End: 2023                         Patient encounter   
procedure                               Dr. Krystian Sloan  
Work Phone:   
[phone]                          [facility]   
[facility]  
   
                                                    Start: 2023  
End: 2023                         Patient encounter   
procedure                               Dr. Krystian Sloan  
Work Phone:   
[phone]                          [facility]  
   
                                Start: 2023 ambulatory      Shnai Sanchez MD  
Work Phone:   
[phone]                          OB/Gynecology  
   
                                        Comment on above:   labs   
   
                                        Start: 2023   E-mail encounter fro  
m   
caregiver                               Shani Sanchez MD  
Work Phone:   
[phone]                          [facility]  
   
                                                    Start: 2023  
End: 2023           ambulatory                Dr. Krystian Sloan  
Work Phone:   
[phone]                          [facility]  
Work Phone:   
[phone]  
   
                                                    Start: 2023  
End: 2023                         Patient encounter   
procedure                               Dr. Krystian Sloan  
Work Phone:   
[phone]                          [facility]  
   
                          Start: 02- ambulatory   JOSE CODY  
[facility]  
   
                                                    Start: 02-  
End: 02-     ambulatory          JOSE SLOAN [facility]  
   
                                Start: 2023 ambulatory      Shani Sanchez MD  
Work Phone:   
[phone]                          OB/Gynecology  
   
                                        Comment on above:   Labs   
   
                                                    Start: 2023  
End: 2023                         Patient encounter   
procedure                               Dr. Krystian Sloan  
Work Phone:   
[phone]                          [facility]  
   
                                                    Start: 2023  
End: 2023                         Patient encounter   
procedure                               Dr. Krystian Sloan  
Work Phone:   
[phone]                          [facility]  
   
                                                    Start: 2023  
End: 2023                         Patient encounter   
procedure                               Dr. Krystian Sloan  
Work Phone:   
[phone]                          [facility]   
[facility]  
   
                                                    Start: 2023  
End: 2023           ambulatory                Dr. Krystian Sloan  
Work Phone:   
[phone]                          [facility]  
Work Phone:   
[phone]  
   
                                                    Start: 2023  
End: 2023                         Patient encounter   
procedure                               Dr. Krystian Sloan  
Work Phone:   
[phone]                          [facility]  
   
                                Start: 2023 Telephone encounter Shanirosy Sanchez MD  
Work Phone:   
[phone]                          OB/Gynecology  
   
                                        Comment on above:   Results; Orders   
   
                                                    Start: 2023  
End: 2023     ambulatory          [facility]  
   
                                                    Start: 2023  
End: 2023     ambulatory          [facility]  
   
                                Start: 2022 Non-patient / Non-visit Dr. Nuria Sloan  
Work Phone:   
[phone]                          [facility]  
   
                                                    Start: 2022  
End: 2022                         Admission to same day   
surgery center                          Dr. Krystian Sloan  
Work Phone:   
[phone]                          [facility]  
   
                                                    Start: 2022  
End: 2022           ambulatory                Dr. Krystian Sloan  
Work Phone:   
[phone]                          [facility]  
Work Phone:   
[phone]  
   
                                                    Start: 2022  
End: 2022           ambulatory                Dr. Krystian Sloan  
Work Phone:   
[phone]                          [facility]  
Work Phone:   
[phone]  
   
                                                    Start: 2022  
End: 2022                         Patient encounter   
procedure                               Dr. Krystian Sloan  
Work Phone:   
[phone]                          [facility]  
   
                                                    Start: 2022  
End: 2022           ambulatory                Dr. Krystian Sloan  
Work Phone:   
[phone]                          [facility]  
Work Phone:   
[phone]  
   
                                                    Start: 2022  
End: 2022                         Patient encounter   
procedure                               Dr. Krystian Sloan  
Work Phone:   
[phone]                          [facility]   
Specimen  
   
                                Start: 2022 Non-patient / Non-visit Dr. Nuria Sloan  
Work Phone:   
[phone]                          [facility]  
   
                                                    Start: 2022  
End: 2022                         Patient encounter   
procedure                               Dr. Krystian Sloan  
Work Phone:   
[phone]                          [facility]  
   
                                                    Start: 2022  
End: 2022                         Patient encounter   
procedure                               Karely Comer MD  
Work Phone:   
[phone]                          OB/Gynecology  
   
                                        Comment on above:   DUB (dysfunctional u  
terine bleeding) (Primary Dx)   
   
                                                    Start: 2022  
End: 2022           ambulatory                Ob Ultrasound  
Work Phone:   
[phone]                          OB/Gynecology  
   
                                                    Start: 2022  
End: 2022                         Patient encounter   
procedure                               [facility] [place]   
Ultrasound  
Work Phone:   
[phone]                          [facility]  
   
                                                    Start: 2022  
End: 2022     [facility]          [facility]  
   
                                Start: 2022 Non-patient / Non-visit Dr. Nuria Sloan  
Work Phone:   
[phone]                          [facility]  
   
                                                    Start: 11-  
End: 11-            Get Medical Advice     Shani Sanchez MD  
Work Phone:   
[phone]                          OB/Gynecology  
   
                                        Comment on above:   Lab Order   
   
                                                    Start: 10-  
End: 10-     ambulatory          [facility]  
   
                                                    Start: 10-  
End: 10-                         Patient encounter   
procedure                               Shani Sanchez MD  
Work Phone:   
[phone]                          OB/Gynecology  
   
                                        Comment on above:   Encounter for gyneco  
logical examination (general) (routine)   
without   
abnormal findings (Primary Dx);  
Encounter for screening mammogram for malignant neoplasm of breast;  
Routine medical exam;  
Screening for thyroid disorder;  
Screening for deficiency anemia;  
Screening cholesterol level;  
Encounter for vitamin deficiency screening;  
Encounter for screening for diabetes mellitus;  
Abnormal uterine bleeding (AUB);  
Screening for cervical cancer;  
Encounter for screening for human papillomavirus (HPV);  
Screen for colon cancer;  
Special screening for malignant neoplasms, colon   
   
                                                    Start: 10-  
End: 10-           Patient encounter status  Shani Sanchez MD  
Work Phone:   
[phone]                          OB/Gynecology  
   
                                                    Start: 10-  
End: 10-     ambulatory          [facility]  
   
                                                    Start: 2022  
End: 2022     ambulatory          [facility]  
   
                                                    Start: 2022  
End: 2022     ambulatory          [facility]  
   
                                                    Start: 2022  
End: 2022     ambulatory          [facility]  Facility:  
   
                                                    Start: 2022  
End: 2022     ambulatory          [facility]  
   
                                                    Start: 2022  
End: 2022                         Encounter for general   
adult medical examination   
without abnormal findings               [facility]   
[facility]  
   
                                                    Start: 2021  
End: 2021                         Patient encounter   
procedure                                           [facility]  
  
  
  
Procedures  
  
  
                          Date         Procedure    Procedure Detail Performing   
Clinician  
   
                          Start: 2024 Mammography               [facility]  
   
                          Start: 2023 Mammography               Shani Sanchez MD  
Work Phone:   
[phone]  
   
                                                    Start: 2022  
End: 2022     Colonoscopy                             Dr. Krystian thompson  
Work Phone:   
[phone]  
   
                                        Start: 2022   Us pelvic nonobstetr  
ic   
real-time image complete                            Shani Sanchez MD  
Work Phone:   
[phone]  
   
                                        Start: 10-   Microscopic observat  
ion   
[Identifier] in Cervix by   
Cyto stain                                          [facility]  
   
                                        Start: 2021   Lipid 1996 panel - S  
mckay or   
Plasma                                              Shani Sanchez MD  
Work Phone:   
[phone]  
   
                          Start: 2021 Mammography               Shani Sanchez MD  
Work Phone:   
[phone]  
   
                                                    Start: 2015  
End: 2015     Shave skin lesion                       Crow Godfrey MD  
Work Phone:   
[phone]  
   
                                                    Start: 2015  
End: 2015     Shave skin lesion                       Crow Godfrey MD  
Work Phone:   
[phone]  
   
                                                    Start: 2015  
End: 2015     Shave skin lesion 0.5 cm/<                     Crow Godfrey MD  
Work Phone:   
[phone]  
   
                                       Anaerobic microbial culture                
Dr. Krystian Sloan  
Work Phone:   
[phone]  
   
                                       Anaerobic microbial culture                
Dr. Krystian Sloan  
Work Phone:   
[phone]  
   
                                                            Investigation of   
transfusion reaction                                Dr. Krystian Sloan  
Work Phone:   
[phone]  
   
                                                            Investigation of   
transfusion reaction                                Dr. Krystian Sloan  
Work Phone:   
[phone]  
   
                                       Microbial culture, routine              D  
mihaela Sloan  
Work Phone:   
[phone]  
   
                                       Microbial culture, routine              D  
mihaela Sloan  
Work Phone:   
[phone]  
  
  
  
Plan of Treatment  
  
  
                          Date         Care Activity Detail       Author  
   
                                        Start: 2032   Screening for malign  
ant   
neoplasm of colon                                   [facility]  
   
                                        Start: 11-   Zoster Vaccines (1 o  
f   
2)                        Zoster Vaccines (1 of 2)  [facility]  
   
                          Start: 10- HPV TESTING  HPV TESTING  [facility]  
   
                          Start: 10- PAP TESTING  PAP TESTING  [facility]  
   
                                        Start: 10-   Screening for malign  
ant   
neoplasm of cervix                                  [facility]  
   
                                        Start: 2027   DTaP/Tdap/Td Vaccine  
s   
(3 - Td or Tdap)                        DTaP/Tdap/Td Vaccines (3   
- Td or Tdap)                           [facility]  
   
                                        Start: 2027   Urine microalbumin   
profile                                 DTaP,Tdap,Td Vaccine (2   
- Td or Tdap)                           [facility]  
   
                          Start: 2026 Lipid panel  Lipid Screening [facility]  
   
                          Start: 2026 LIPID SCREEN LIPID SCREEN [facility]  
   
                                        Start: 10-   Screening for malign  
ant   
neoplasm of cervix                                  [facility]  
   
                                        Start: 2025   Screening for malign  
ant   
neoplasm of breast                                  [facility]  
   
                          Start: 2024 Influenza vaccination              C  
[facility]  
   
                          Start: 2024 DIABETES SCREEN DIABETES SCREEN [facility]v  
[facility]  
   
                          Start: 2024 Diabetes Screening Diabetes Screenin  
g [facility]  
   
                          Start: 2024 Mammography  MAMMOGRAM    [facility]  
   
                                        Start: 2024   Screening for malign  
ant   
neoplasm of breast        Mammogram Screening       [facility]  
   
                                        Start: 2024   Behavioral Health   
Screening                               Behavioral Health   
Screening                               [facility]  
   
                          Start: 2024 [facility]  
[facility]  
   
                          Start: 10- HPV TESTING  HPV TESTING  [facility]  
   
                          Start: 10- PAP TESTING  PAP TESTING  [facility]  
   
                                        Start: 2023   Covid-19 Vaccine ( season)                         Covid-19 Vaccine ( season)                         [facility]  
   
                          Start: 2023 Influenza vaccination Influenza Vacc  
ine (#1) [facility]  
   
                          Start: 2023 DEPRESSION ASSESSMENT DEPRESSION ASS  
ESSMENT [facility]  
   
                                        Start: 2022   Colonoscopy w/biopsy  
   
single/multiple           COLONOSCOPY AND BIOPSY    [facility]  
   
                          Start: 2022 Patient discharge              [facility]  
   
                                        Start: 2022   Bacteria identified   
in   
Wound deep by Culture                               [facility]  
Work Phone:   
[phone]  
   
                          Start: 11- COLOGUARD (FIT-DNA) COLOGUARD (FIT-D  
NA) [facility]  
   
                          Start: 11- Colonoscopy  COLONOSCOPY  [facility]  
   
                                        Start: 11-   COLORECTAL CANCER   
SCREENING                               COLORECTAL CANCER   
SCREENING                               [facility]  
   
                          Start: 11- CT COLONOGRAPHY CT COLONOGRAPHY [facility]  
   
                          Start: 11- FECAL OCCULT BLOOD FECAL OCCULT BLOO  
D [facility]  
   
                                        Start: 11-   Screening for malign  
ant   
neoplasm of colon                                   [facility]  
   
                          Start: 11- SIGMOIDOSCOPY SIGMOIDOSCOPY [facility]  
   
                                                    Start: 10-  
End: 2022                         25-hydroxyvitamin D3   
[Mass/volume] in Serum   
or Plasma                               VITAMIN D 25 HYDROXY Lab   
Routine Routine medical   
exam Encounter for   
vitamin deficiency   
screening Expected:   
10/28/2022, Expires:   
2022                              [facility]  
Work Phone:   
[phone]  
   
                                        Comment on above:   Expected: 10/28/2022  
, Expires: 2022   
   
                                                    Start: 10-  
End: 2022                         Basic metabolic 2000   
panel - Serum or Plasma                 BASIC METABOLIC PNL Lab   
Routine Routine medical   
exam Encounter for   
screening for diabetes   
mellitus Expected:   
10/28/2022, Expires:   
2022                              [facility]  
Work Phone:   
[phone]  
   
                                        Comment on above:   Expected: 10/28/2022  
, Expires: 2022   
   
                                                    Start: 10-  
End: 2022                         CBC panel - Blood by   
Automated count                         CBC Lab Routine Routine   
medical exam Screening   
for deficiency anemia   
Expected: 10/28/2022,   
Expires: 2022                     [facility]  
Work Phone:   
[phone]  
   
                                        Comment on above:   Expected: 10/28/2022  
, Expires: 2022   
   
                                                    Start: 10-  
End: 2022           LIPID PANEL, NONFASTING   LIPID PANEL, NONFASTING   
Lab Routine Routine   
medical exam Screening   
cholesterol level   
Expected: 10/28/2022,   
Expires: 2022                     [facility]  
Work Phone:   
[phone]  
   
                                        Comment on above:   Expected: 10/28/2022  
, Expires: 2022   
   
                                                    Start: 10-  
End: 10-           PELVIC US [facility]             PELVIC US [facility] Anc   
Imaging Routine Abnormal   
uterine bleeding (AUB)   
Expected: 10/28/2022,   
Expires: 10/28/2023                     [facility]  
Work Phone:   
[phone]  
   
                                        Comment on above:   Expected: 10/28/2022  
, Expires: 10/28/2023   
   
                                                    Start: 10-  
End: 2022                         Thyrotropin   
[Units/volume] in Serum   
or Plasma                               TSH BLD Lab Routine   
Routine medical exam   
Screening for thyroid   
disorder Expected:   
10/28/2022, Expires:   
2022                              [facility]  
Work Phone:   
[phone]  
   
                                        Comment on above:   Expected: 10/28/2022  
, Expires: 2022   
   
                          Start: 2022 Influenza vaccination INFLUENZA (#1)  
 [facility]  
   
                          Start: 2022 Mammography  MAMMOGRAM    [facility]  
   
                          Start: 2022 DEPRESSION ASSESSMENT DEPRESSION ASS  
ESSMENT [facility]  
   
                                                    Start: 2017  
End: 2017     Appointment         Appointment         [facility]   
Sports Medicine and   
Orthopaedics  
Work Phone:   
[phone]  
   
                                                    Start: 2017  
End: 2017     Appointment         Appointment         [facility] Medicine and   
Orthopaedics  
Work Phone:   
[phone]  
   
                                                    Start: 2017  
End: 2017                         Physical Therapy   
General                                 Physical Therapy General   
Rehab [facility], [address] [place] [address], [address]   
[phone]                         [facility]   
Sports Medicine and   
Orthopaedics  
Work Phone:   
[phone]  
   
                                                    Start: 2017  
End: 2017     Appointment         Appointment         [facility]   
Sports Medicine and   
Orthopaedics  
Work Phone:   
[phone]  
   
                                                    Start: 2017  
End: 2017     Extremity study     Venous Doppler LE Left [facility]   
Sports Medicine and   
Orthopaedics  
Work Phone:   
[phone]  
   
                                                    Start: 2017  
End: 2017     Appointment         Appointment         [facility]   
Sports Medicine and   
Orthopaedics  
Work Phone:   
[phone]  
   
                                                    Start: 2017  
End: 2017                         Mri jnt of lwr extre   
w/o dye                                 MRI Joint Lower   
Extremity                               [facility]   
Sports Medicine and   
Orthopaedics  
Work Phone:   
[phone]  
   
                                                    Start: 2015  
End: 2015     Follow up Appt 1 week Follow up Appt 1 week [facility]   
Sports Medicine and   
Orthopaedics  
Work Phone:   
[phone]  
   
                                                    Start: 2015  
End: 2015           Shave skin lesion         Shave Excision   
Trunk/Arms/Legs .6-1.0   
cm                                      [facility]   
Sports Medicine and   
Orthopaedics  
Work Phone:   
[phone]  
   
                                                    Start: 2015  
End: 2015           Shave skin lesion         Shave Excision   
Trunk/Arms/Legs <0.6 cm                 [facility]   
Sports Medicine and   
Orthopaedics  
Work Phone:   
[phone]  
   
                                                    Start: 2015  
End: 2015                         Shave skin lesion 0.5   
cm/<                                    Shave Excision Face <0.6   
cm                                      [facility]   
Sports Medicine and   
Orthopaedics  
Work Phone:   
[phone]  
   
                                                    Start: 2015  
End: 2015     Follow Up Appt Other Follow Up Appt Other [facility]  
   
Sports Medicine and   
Orthopaedics  
Work Phone:   
[phone]  
   
                                        Start: 11-   Screening for malign  
ant   
neoplasm of cervix        HPV/Cotest                [facility]  
   
                                        Start: 11-   Hepatitis B Vaccine   
(1   
of 3 - 19+ 3-dose   
series)                                 Hepatitis B Vaccine (1   
of 3 - 19+ 3-dose   
series)                                 [facility]  
   
                                        Start: 11-   Hepatitis B Vaccines  
 (1   
of 3 - 19+ 3-dose   
series)                                 Hepatitis B Vaccines (1   
of 3 - 19+ 3-dose   
series)                                 [facility]  
   
                                        Start: 11-   Urine microalbumin   
profile                   DTAP,TDAP,TD (1 - Tdap)   [facility]  
   
                          Start: 11- HEPATITIS C SCREENING HEPATITIS C [facility]  
   
                          Start: 11- Hepatitis C screening Hepatitis C [facility]  
   
                          Start: 11- HIV SCREENING HIV SCREENING [facility]  
   
                          Start: 11- HIV screening HIV Screening [facility]  
   
                                        Start: 11-   MMR Vaccines (1 of 1  
 -   
Standard series)                        MMR Vaccines (1 of 1 -   
Standard series)                        [facility]  
   
                          Start: 05- COVID-19 VACCINE (#1) COVID-19 VACCI  
NE (#1) [facility]  
   
                                        Start: 1977   HEPATITIS B (1 of 3   
-   
3-dose series)                          HEPATITIS B (1 of 3 -   
3-dose series)                          [facility]  
   
                                        Start: 1977   Hepatitis B Vaccine   
(1   
of 3 - 3-dose series)                   Hepatitis B Vaccine (1   
of 3 - 3-dose series)                   [facility]  
   
                          Start: 1975 HIV screening HIV Screening [facility]  
   
                          Start: 1977 Lipid panel  Lipid Panel  [facility]  
   
                                        Start: 1977   Screening for malign  
ant   
neoplasm of colon                                   [facility]  
   
                          Start: 1977 Yearly Adult Physical Yearly Adult P  
[facility]  
   
                                       Anaerobic Culture Anaerobic Culture [facility]  
Work Phone:   
[phone]  
   
                                       Colonoscopy               [facility]  
Work Phone:   
[phone]  
   
                                       Colonoscopy               [facility]  
   
                                                            CT Abdomen and Pelvi  
s W   
contrast IV                                         [facility]  
   
                                                      
End: 2024                         CT for calcium scoring   
WO contrast and CTA W   
contrast IV Heart and   
coronary arteries                                   [facility]  
Work Phone:   
[phone]  
   
                                        Comment on above:   Once for 1 Occurrenc  
es starting 2024 until 2024   
   
                                                      
End: 2025                         DBT Breast - bilateral   
screening                               [facility]   
Radiology Routine   
Encounter for screening   
mammogram for breast   
cancer 1 Occurrences   
starting 2024   
until 2025                        [facility]  
Work Phone:   
[phone]  
   
                                        Comment on above:   1 Occurrences starti  
ng 2024 until 2025   
   
                                                            DBT Breast - bilater  
al   
screening                               [facility]   
Radiology Routine   
Encounter for screening   
mammogram for breast   
cancer 2024 8:29   
AM EDT                                  [facility]  
Work Phone:   
[phone]  
   
                                                      
End: 2023           [facility] SCREENING [facility]      [facility]   
Radiology Routine   
Encounter for screening   
mammogram for malignant   
neoplasm of breast 1   
Occurrences starting   
10/28/2022 until   
2023                              [facility]  
Work Phone:   
[phone]  
   
                                        Comment on above:   1 Occurrences starti  
ng 10/28/2022 until 2023   
   
                                                            Microbial culture,   
routine                   Wound Culture             [facility]  
Work Phone:   
[phone]  
   
                                                            PAP FLUID CERVICAL   
SCREENING                               PAP FLUID CERVICAL   
SCREENING Lab Routine   
Screening for cervical   
cancer Encounter for   
screening for human   
papillomavirus (HPV)   
Ordered: 10/28/2022                     [facility]  
Work Phone:   
[phone]  
   
                                        Comment on above:   Ordered: 10/28/2022   
   
                                       Patient Education Medications  [facility]  
[facility]   
Sports Medicine and   
Orthopaedics  
Work Phone:   
[phone]  
   
                                       Patient referral              [facility]  
Work Phone:   
[phone]  
   
                                                      
End: 10-           Screening colonoscopy     COLONOSCOPY SCREENING   
Endoscopy Routine Screen   
for colon cancer 1   
Occurrences starting   
10/28/2022 until   
10/28/2023                              [facility]  
Work Phone:   
[phone]  
   
                                        Comment on above:   1 Occurrences starti  
ng 10/28/2022 until 10/28/2023   
   
                                                                 [facility]  
c  
   
                                                                 [facility]  
c  
   
                                                                 [facility]  
  
  
  
  
Immunizations  
  
  
                      Immunization Date Immunization Notes      Care Provider Scooby whitehead  
   
                                        10-          influenza, injectabl  
e,   
quadrivalent,   
preservative free                                   Dr. Krystian Sloan  
Work Phone:   
[phone]                          [facility]  
   
                                        10-          influenza, seasonal,  
   
injectable                                          Dr. Krystian Sloan  
Work Phone:   
[phone]                          [facility]  
   
                                        10-          influenza, seasonal,  
   
injectable,   
preservative free                                           [facility]  
Work Phone:   
[phone]  
   
                                        10-          influenza virus   
vaccine, unspecified   
formulation                                         Shani Sanchez MD  
Work Phone:   
[phone]                          [facility]  
   
                                        2017          influenza, injectabl  
e,   
quadrivalent,   
preservative free                                   Dr. Krystian Sloan  
Work Phone:   
[phone]                          [facility]  
   
                                        2017          influenza, seasonal,  
   
injectable                                          Dr. Krystian Sloan  
Work Phone:   
[phone]                          [facility]  
   
                                        2017          influenza, seasonal,  
   
injectable,   
preservative free                                           [facility]  
Work Phone:   
[phone]  
   
                                        2017          tetanus toxoid,   
reduced diphtheria   
toxoid, and acellular   
pertussis vaccine,   
adsorbed                                                    [facility]  
   
                                        10-          influenza injectabl  
e,   
quadrivalent,   
preservative free                                   Dr. Krystian Sloan  
Work Phone:   
[phone]                          [facility]  
   
                                        10-          influenza, seasonal,  
   
injectable                                          Dr. Krystian Sloan  
Work Phone:   
[phone]                          [facility]  
   
                                        10-          influenza, seasonal,  
   
injectable,   
preservative free                                           [facility]  
Work Phone:   
[phone]  
   
                                        10-          influenza, injectabl  
e,   
quadrivalent,   
preservative free                                   Dr. Krystian Sloan  
Work Phone:   
[phone]                          [facility]  
   
                                        10-          influenza, seasonal,  
   
injectable                                          Dr. Krystian Sloan  
Work Phone:   
[phone]                          [facility]  
   
                                        10-          influenza, seasonal,  
   
injectable,   
preservative free                                           [facility]  
Work Phone:   
[phone]  
   
                                        10-          influenza, injectabl  
e,   
quadrivalent,   
preservative free                                   Dr. Krystian Sloan  
Work Phone:   
[phone]                          [facility]  
   
                                        10-          influenza, seasonal,  
   
injectable                                          Dr. Krystian Sloan  
Work Phone:   
[phone]                          [facility]  
   
                                        10-          influenza, seasonal,  
   
injectable,   
preservative free                                           [facility]  
Work Phone:   
[phone]  
   
                                        10-          Influenza virus   
vaccine                                             Dr. Krystian Sloan  
Work Phone:   
[phone]                          [facility]  
   
                                        10-          influenza, seasonal,  
   
injectable,   
preservative free                                           [facility]  
Work Phone:   
[phone]  
  
  
  
Payers  
  
  
                          Date         Payer Category Payer        Policy ID  
   
                          2024   Self-pay                  [id number]  
[id number]  
   
                          2023   Unknown                   [id number]  
[id number]  
   
                          2021   Unknown                   [id number]  
[id number]  
   
                          2017   Unknown                   [id number]  
[id number]  
   
                          1977   Unknown                   [id number] 2.16.84  
[id number]  
   
                          1977   Unknown                   [id number] 2.16.84  
[id number]  
   
                          1977   Unknown                   [id number] 2.16.84  
[id number]  
   
                          1977   Unknown                   [id number] 2.16.84  
[id number]  
   
                          1977   Unknown                   [id number] 2.16.84  
[id number]  
   
                          1977   Unknown                   [id number] 2.16.84  
[id number]  
   
                          1977   Unknown                   [id number] 2.16.84  
[id number]  
   
                          1977   Unknown                   [id number] 2.16.84  
[id number]  
   
                          1977   Unknown                   [id number] 2.16.84  
[id number]  
   
                          1977   Unknown                   [id number] 2.16.84  
[id number]  
   
                          1977   Unknown                   [id number] 2.16.8  
[id number]  
   
                          1960   Unknown                   [id number]  
   
                                       Unknown                   [id number]  
[id number]  
   
                                       Unknown                   [id number] 2.16.8  
[id number]  
   
                                       Unknown                   [id number] 2.16.8  
[id number]  
   
                                       Unknown                   [id number] 2.16.8  
[id number]  
   
                                       Unknown                   [id number] 2.16.8  
[id number]  
   
                                       Unknown                   [id number] 2.16.8  
[id number]  
   
                                       Unknown                   [id number] 2.16.8  
[id number]  
   
                                       Unknown                   [id number] 2.16.8  
[id number]  
   
                                       Unknown                   [id number] 2.16.8  
[id number]  
   
                                       Unknown                   [id number] 2.16.8  
[id number]  
  
  
  
Social History  
  
  
                          Date         Type         Detail       Facility  
   
                                                    Start: 2021  
End: 2024                         Tobacco smoking   
status NHIS                             Unknown if ever   
smoked                                  [facility]  
   
                                        Start: 1977   Sex Assigned At   
Birth                     Female                    [facility]  
   
                                        Start: 10-   Tobacco smoking   
status NHIS               Never smoked tobacco      [facility]  
   
                                        Start: 10-   Tobacco use and   
exposure                                Smokeless tobacco   
non-user                                [facility]  
   
                                                    Start: 10-  
End: 2024           Alcohol intake            Current drinker of   
alcohol (finding)                       [facility]  
   
                                        Start: 1977   Sex Assigned At   
Birth                     Not on file               [facility]  
   
                                                    Start: 2022  
End: 2024                         Exposure to   
SARS-CoV-2 (event)        Not sure                  [facility]  
Work Phone:   
[phone]  
   
                                                    Start: 10-  
End: 2024                         History of Social   
function                                            [facility]  
   
                                                    Start: 10-  
End: 2024     Tobacco use panel                       [facility]  
   
                                                            National Score   
(1-100), lower   
number is lower risk      56                        [facility]  
   
                                                    NEGATED: Highlighted   
row                                     pregnant            [facility]  
  
  
  
Goals  
  
  
                                Date            Patient Goal    Desired Activity  
/State  
   
                                                                  
  
  
  
Mental Status  
  
  
                          Date         Assessment   Result       Facility  
   
                          2022   Cognitive function Voice/Name   [facility]  
Work Phone: [phone]  
  
  
  
Clinical Notes 2022 to 2024  
           Letter - Coordinator, Mammography - 2024 8:44 AM EDT  
  
                                Note Date & Type Note            Facility  
   
                                        2024 Note     [facility]  
Medical Records Department  
[address]  
[place] [address]  
  
History Physical Exam  
24 0603  
MR#: [id number] Acct: [id number]  
Name: MAGGY CARPIO Rep #:   
[id number]  
: 1975 48 From: Faye Valero MD  
PCP: Dr. Jose Sloan MD   
Status:REG SDC  
  
Location: [place] [address]  
  
  
  
  
History and Physical  
Date of Admission: 24  
Allergies  
  
No Known Allergies Allergy (Verified   
24 15:35)  
  
  
Medications  
  
cholecalciferol (vitamin D3) 125 mcg   
(5,000 unit) capsule 125 mcg PO DAILY   
22 [History  
Confirmed 24]  
magnesium glycinate 100 mg tablet 100   
mg PO DAILY 24 [History   
Confirmed 24]  
  
  
  
PFSH  
Medical History (Reviewed 24 @   
15:34 by Kate Potts)  
  
Anxiety  
Hand, foot and mouth disease  
Heartburn  
History of drainage of abscess (   
2022)  
History of IBS  
History of pain when walking  
History of steroid therapy  
Migraine headache  
Non-smoker  
Pilonidal abscess of rubi cleft  
Wears glasses  
  
  
Surgical History (Reviewed 24 @   
15:34 by Kate Potts)  
  
History of ankle surgery  
History of arthroscopy of left knee  
History of colonoscopy  
History of esophagogastroduodenoscopy   
(EGD)  
History of incision and drainage  
History of laparoscopic   
cholecystectomy  
History of surgical removal of   
ganglion cyst  
History of umbilical hernia repair  
  
  
Family History (Reviewed 24 @   
15:34 by Kate Potts)  
Father  
Colon polyps  
Diabetes  
AAA (abdominal aortic aneurysm)  
HypertensionMother  
Colon polyps  
Neuropathy  
  
Social History (Reviewed 24 @   
15:34 by Kate Potts)  
Smoking Status: Never smoker  
  
  
  
HPI  
HPI  
HPI:  
Patient is a 47 y/o F I am seeing for   
an update history and physical for an   
upcoming elective right  
possible bilateral inguinal hernia   
repair with mesh. Patient denies any   
recent hospitalization or  
illnesses. Patient denies denies any   
medication changes. She denies any   
compilations or side effects  
previously from anesthesia. Patient   
notes a previous history of umbilical   
hernia repair. Patient  
denies any cardiac or pulmonary   
concerns. She does not follow with a   
cardiologist. She is not  
currently on any blood thinners.  
  
Patient's previous history per Dr. Valero:  
48-year-old female. I have previously   
assisted her with a pilonidal abscess   
which had quite a  
delayed healing and required multiple   
office visits.She now presents with   
concerns regarding a  
possible hernia. The patient states   
that but tickly when she is standing   
that she is noted which  
she is suggesting some fatty fullness   
in the right medial groin area. She   
does not correlate this  
with any particular injury or   
accident. She likes running and is   
scheduled to do a running event in  
May. She is always able to get the   
area to reduce and actually can feel   
the area reduced when she  
lies supine.  
  
She has had a previous laparoscopic   
cholecystectomy. She has had a remote   
umbilical herniorrhaphy  
in childhood. No report of mesh in   
that location.  
  
She has been having some right lower   
quadrant discomfort which she always   
attributed to her right  
ovary.  
  
Upon self-examination she is wondering   
whether she has weakness on the left   
as well  
  
  
ROS  
General  
General: No weight change, appetite,   
fatigue, colon cancer, breast cancer   
or weakness  
HEENT  
HEENT: No difficulty swallowing, eye   
injury, eye surgery, swollen glands or   
hoarseness  
Endo  
Endocrine: No thyroid disease,   
diabetes mellitus, thyroid cancer,   
Hair loss, heat intolerance or  
cold intolerance  
Skin  
Skin: No rash or changing moles  
Breast  
Breast: No left breast lump, right   
breast lump, nipple discharge, breast   
pain, abnormal mammogram,  
abnormal US or breast enlargement  
Musc  
Musculoskeletal: No back problems,   
arthritis, rheumatoid arthritis, gout   
or joint pain  
Cardio  
Cardiovascular: No murmur, pacemaker,   
heart disease, atrial fibrillation,   
high blood pressure, heart  
attack, heart stent, palpitations,   
shortness of breat with exertion or   
chest pain  
Psych  
Psychiatric: No depression, anxiety or   
hearing voices  
Resp  
Respiratory: No shortness of breath,   
No sleep apnea, No cough, No COPD, No   
asthma, No emphysema and  
No wheezing  
Gastro  
Gastrointestinal: No abdominal pain,   
No nausea or vomiting, No diarrhea, No   
constipation, No blood  
in stool, No acid reflux, No   
hemorrhoids, No ulcers, No gallbladder   
problem and No black,tarry  
stools  
Hema  
Hematologic: No blood thinners, No   
blood disorders, No bleeding, No   
anemia and No blood clots  
Neuro  
Neurologic: No system reviewed and no   
additional complaints, except as   
documented, No as per HPI, No  
abnormal gait, No abnormal hearing, No   
abnormal movements, No abnormal   
speech, No behavioral  
changes, No burning sensations, No   
confusion, No convulsions, No   
disequilibrium, No dizziness, No  
localized weakness, No frequent falls,   
No headache(s), No lack of   
coordination, No loss of vision,  
No memory loss, No n (more content not   
included)...                            [facility]  
   
                                                    2024 Miscellaneous   
Notes                                   Formatting of this note might be   
different from the original.  
2024  
  
PID: [id number] Maggy Carpio  
[address]  
[place] [address]  
  
Dear Ms. Carpio,  
  
We are pleased to inform you that the   
results of your recent breast imaging   
exam on 2024 are normal.  
  
Your mammogram demonstrates that you   
have dense breast tissue, which could   
hide abnormalities. Dense breast   
tissue, in and of itself, is a   
relatively common condition.   
Therefore, this information is not   
provided to cause undue concern;   
rather, it is to raise your awareness   
and promote discussion with your   
health care provider regarding the   
presence of dense breast tissue in   
addition to other risk factors.  
  
Early detection of cancer is very   
important. We also understand   
recommendations regarding breast   
cancer screening are controversial.   
Please discuss with your primary care   
provider which strategy is best for   
you and whether a mammogram is right   
for you.  
  
Your imaging studies and report will   
be kept on file at [facility] as   
part of your permanent medical record   
and are available for your continuing   
care.  
  
Thank you for allowing us to help in   
meeting your health care needs.  
  
Sincerely,  
  
Dr. Schmidt  
Interpreting Radiologist  
[facility]  
  
(Normal over 40)  
Electronically signed by Coordinator,   
Mammography at 2024 8:44 AM EDT  
documented in this encounter            [facility]  
  
  
  
                                        2024 Progress note   
  
  
  
   
                                           
   
                                        Note Date/Time      2024   
12:26pm  
   
                                                    [facility]  
[facility]  
[facility]  
[address] [place] [address]. [address]  
[place] [address]  
[phone]  
  
OFFICE VISIT  
Date of Service: 24  
  
MR#: [id number] Acct: [id number]  
  
Name: MAGGY CARPIO Rep #: [id number]  
[id number]  
: 1975 Provider: Dr. Aden  
rt MARIO Valero MD  
Age/Sex: 48/F Location: [facility]  
  
Status: Signed  
  
Intake  
Vital Signs  
  
  
24  
15:12  
  
Height 5 ft 10 in  
  
/84 H  
  
Blood Pressure Location Rt brachial  
  
Position Sitting  
  
Respiration 16  
  
  
Intake  
Visit Reasons: I&D of pilonidal cyst  
Chief Complaint: inflamed pilonidal cyst  
 Required: No  
Accompanied by:   
Is patient in pain?: No  
Allergies  
  
No Known Allergies Allergy (Verified 24 12:24)  
  
  
  
Medications  
  
cholecalciferol (vitamin D3) 125 mcg (5,000 unit) capsule   
125 mcg PO DAILY 22 [History Confirmed 24]  
magnesium glycinate 100 mg tablet 100 mg PO DAILY   
24 [History Confirmed 24]  
  
  
  
PFSH  
Medical History (Reviewed 24 @ 12:23 by April Aguilar)  
  
Abscess  
Anxiety  
Hand, foot and mouth disease  
Heartburn  
History of drainage of abscess (~2022)  
History of IBS  
Migraine headache  
Non-smoker  
Pilonidal abscess of  cleft  
  
  
Surgical History (Reviewed 24 @ 12:23 by April Aguilar)  
  
History of ankle surgery  
History of arthroscopy of left knee  
History of colonoscopy  
History of esophagogastroduodenoscopy (EGD)  
History of incision and drainage  
History of laparoscopic cholecystectomy  
History of surgical removal of ganglion cyst  
History of umbilical hernia repair  
  
  
Family History (Reviewed 24 @ 12:23 by April Aguilar)  
Father Colon polyps  
Diabetes  
AAA (abdominal aortic aneurysm)  
Hypertension  
Mother Colon polyps  
Neuropathy  
  
  
Social History (Reviewed 24 @ 12:23 by April Aguilar)  
Smoking Status: Never smoker  
  
  
  
HPI  
HPI  
HPI:  
Patient is 1 year out status post resolving what appeared   
to be a pilonidal abscess. She has just a small amount of   
redness in the even more smaller amountof little pustule  
  
Exam  
Skin  
Other:  
With nursing present the sacrococcygeal area was   
inspected. 3 mm to the right of the midline there is   
minimal erythema and a 1 mm whitish discoloration.  
  
Office Procedures  
Procedure Time Out  
Time Out  
Informed consent given: Yes  
Consent signed: Yes  
Time out checklist: patient, procedure, site   
marked/identified, positioning of patient, supplies   
available, allergies confirmed and team agrees on   
procedure  
Time out staff in room: Yes  
Time out verified: Yes  
Time out date: 24  
Time out time: 12:25  
  
  
Assessment and Plan  
Assessment and Plan  
(1) Pilonidal abscess of rubi cleft:  
Status: Acute  
Plan:  
The area of concern was very diminutive. I had to use a   
bright light to inspectit. We did cleanse it with some   
Betadine use some local and I used splinter forceps to   
inspect the area a extraordinary minimal amount of pus   
was encountered that went from the right to the midline.   
I unroofed it for 3 mm. Did notappear to have a depth   
more than 3 mm. No further tracking. Treated with silver   
nitrate. I expect it to resolve. She will notify me if   
she has any difficulties and they may use silver nitrate   
as needed at home.  
  
Faye Valero M.D., F.A.C.S.  
  
Coding  
Level of Care Code  
Off vis,est,level 2  
  
Diagnoses  
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  
  
  
24 1250 <Electronically signed by Faye omer MD>  
Date ___________ __________________________________  
_  
Faye Valero MD  
  
  
  
  
  
  
Cosigner Signature: Date ___________   
_____________________  
______________  
(if applicable)  
  
  
CC: ~  
   
  
[facility]  
Work Phone: [phone]- NoteHNO ID: [id number]  
Author: SHANI HOUSE MD  
Service: ?  
Author Type: Physician  
Type: Progress Notes  
Filed: 2024 09:54  
Note Text:  
Chaperone offered: Patient declinesBreann Smart is a 48 year old  who presents for an annual gynecologic exam with  
complaints, irregular bleeding and perimenopausal symptoms. Not sleeping as  
well- more hot flashes/night sweats. 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  
Menses: Irregular- sometimes Skips 2 months now- 4-5 flow.  
Contraception: vasectomy  
HPV vaccine: No  
Last Pap: 2022 normal  
HPV: 11/3/2022 negative  
History of abnormal pap: No  
Last mammogram: normal  
Sexually active: Yes  
History of STDS: None  
Patient concerns for STD exposure: No.  
Pain with intercourse: No  
Postcoital bleeding: No  
Exercise: Personal training with Stephanie Allen 2 x week  
Diet: balanced  
OB History  
 T0  L0  
SAB0 IAB0 Ectopic0 Multiple0 Live Births0  
Gyn History  
LMP: 2024, Having periods  
Age at Menarche:  
Age at First Pregnancy:  
Age at Menopause:  
Gyn History Comments:  
Sexual Activity: Yes; Male  
Contraception: Vasectomy  
PAST MEDICAL HISTORY  
Diagnosis Date  
Lactose intolerance  
Raynaud disease  
PAST SURGICAL HISTORY  
Procedure Laterality Date  
ANKLE ARTHROSCOPY Right 2018  
COLONOSCOPY W/BIOPSY SINGLE/MULTIPLE 13  
EGD TRANSORAL BIOPSY SINGLE/MULTIPLE 13  
KNEE ARTHROSCOPY Left 2017  
LAP MARVA W EXPLORATION OF CD 2012  
PAST SURGICAL HISTORY OF  
ganglion cyst on wrist x3  
REPAIR UMBILICAL HERNIA 1982  
SKIN BX, 1 LESION 14  
Punch bx right gluteal fold skin lesion  
FAMILY HISTORY  
Problem Relation Age of Onset  
Diabetes Father  
other (myasthenia gravis) Father  
SOCIAL HISTORY  
Social History  
Tobacco Use  
Smoking status: Never  
Smokeless tobacco: Never  
Vaping Use  
Vaping Use: Never used  
Substance Use Topics  
Alcohol use: Yes  
Comment: 3x monthly  
Drug use: Never  
REVIEW OF SYSTEMS  
Abdomen: No abdominal pain, nausea, vomiting, diarrhea, or constipation. No  
bloating, early satiety, indigestion, or increased flatulence.  
Bladder: No dysuria, gross hematuria, urinary frequency, urinary urgency, or  
incontinence.  
Breast: No breast lumps, nipple d/c, overlying skin changes, redness or skin  
retraction.  
Allergies and current medication updated:Yes  
EXAM: /78   Ht 5' 10  (1.78m)   Wt 243 lb (110.2kg)   LMP 2024   BMI  
34.87 kg/(m2).  
GENERAL: pleasant,  female in no apparent distress  
HEENT: Normocephalic, atraumatic, mucus membranes moist, and no lesions  
NECK: Supple, full range of motion, no adenopathy, and thyroid normal  
DERMATOLOGY: Normal, without lesions, non-icteric, and non-hirsute  
BREAST: soft, non-tender, symmetric, no dominant mass, normal nipple-areolar  
complex, no lymphadenopathy, and no nipple discharge  
ABDOMEN: soft, non-tender, and no masses  
PELVIC: external genitalia normal, normal Bartholin's glands, urethra, [place]'s  
glands, no vulvar lesions, no cervical lesions, good vaginal support,  
physiologic discharge present, normal appearing perineal body and perianal  
region  
BIMANUAL: uterus normal size, shape and consistency, no adnexal masses, and  
non-tender  
RECTOVAGINAL: deferred.  
NEURO: alert and oriented x3,exam grossly non-focal  
EXTREMITIES: normal  
ASSESSMENT/PLAN:  
1) Health maintenance:  
Pap/HPV up to date.  
Mammogram ordered.  
Nutrition, exercise and routine health maintenance exams reviewed.  
2) Contraception: vasectomy. Contraceptive options reviewed and information  
provided.  
3) STD screening: Declined STD check.  
4) Follow up one year or sooner as needed  
5) reviewed options for perimenopausal symptoms hormonal vs non hormonal.  
[facility] 
Present illness Narrative* Shani House MD - 2024 8:48 AM EDT
  Formatting of this note is different from the original.  
Chaperone offered: Patient declines.  
  
Maggy is a 48 year old  who presents for an annual gynecologic exam with 
complaints, irregular bleeding and perimenopausal symptoms. Not sleeping as 
well- more hot flashes/night sweats. 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  
  
Menses: Irregular- sometimes Skips 2 months now- 4-5 flow.  
Contraception: vasectomy  
HPV vaccine: No  
Last Pap: 2022 normal  
HPV: 11/3/2022 negative  
History of abnormal pap: No  
Last mammogram: normal  
Sexually active: Yes  
History of STDS: None  
Patient concerns for STD exposure: No.  
Pain with intercourse: No  
Postcoital bleeding: No  
Exercise: Personal training with [employer] 2 x week  
Diet: balanced  
  
OB History  
 T0  L0  
SAB0 IAB0 Ectopic0 Multiple0 Live Births0  
  
Gyn History  
  
LMP: 2024, Having periods  
Age at Menarche:  
Age at First Pregnancy:  
Age at Menopause:  
Gyn History Comments:  
Sexual Activity: Yes; Male  
Contraception: Vasectomy  
  
  
  
PAST MEDICAL HISTORY  
Diagnosis Date  
Lactose intolerance  
Raynaud disease  
  
PAST SURGICAL HISTORY  
Procedure Laterality Date  
ANKLE ARTHROSCOPY Right 2018  
COLONOSCOPY W/BIOPSY SINGLE/MULTIPLE 13  
EGD TRANSORAL BIOPSY SINGLE/MULTIPLE 13  
KNEE ARTHROSCOPY Left 2017  
LAP MARVA W EXPLORATION OF CD 2012  
PAST SURGICAL HISTORY OF  
ganglion cyst on wrist x3  
REPAIR UMBILICAL HERNIA 1982  
SKIN BX, 1 LESION 14  
Punch bx right gluteal fold skin lesion  
  
FAMILY HISTORY  
Problem Relation Age of Onset  
Diabetes Father  
other (myasthenia gravis) Father  
SOCIAL HISTORY  
Social History  
  
Tobacco Use  
Smoking status: Never  
Smokeless tobacco: Never  
Vaping Use  
Vaping Use: Never used  
Substance Use Topics  
Alcohol use: Yes  
Comment: 3x monthly  
Drug use: Never  
  
REVIEW OF SYSTEMS  
Abdomen: No abdominal pain, nausea, vomiting, diarrhea, or constipation. No 
bloating, early satiety, indigestion, or increased flatulence.  
Bladder: No dysuria, gross hematuria, urinary frequency, urinary urgency, or 
incontinence.  
Breast: No breast lumps, nipple d/c, overlying skin changes, redness or skin 
retraction.  
Allergies and current medication updated:Yes  
  
EXAM: /78   Ht 5' 10  (1.78m)   Wt 243 lb (110.2kg)   LMP 2024   BMI
34.87 kg/(m^2).  
  
  
GENERAL: pleasant,  female in no apparent distress  
HEENT: Normocephalic, atraumatic, mucus membranes moist, and no lesions  
NECK: Supple, full range of motion, no adenopathy, and thyroid normal  
DERMATOLOGY: Normal, without lesions, non-icteric, and non-hirsute  
BREAST: soft, non-tender, symmetric, no dominant mass, normal nipple-areolar 
complex, no lymphadenopathy, and no nipple discharge  
ABDOMEN: soft, non-tender, and no masses  
PELVIC: external genitalia normal, normal Bartholin's glands, urethra, [place]'s 
glands, no vulvar lesions, no cervical lesions, good vaginal support, 
physiologic discharge present, normal appearing perineal body and perianal 
region  
BIMANUAL: uterus normal size, shape and consistency, no adnexal masses, and 
non-tender  
RECTOVAGINAL: deferred.  
NEURO: alert and oriented x3,exam grossly non-focal  
EXTREMITIES: normal  
  
ASSESSMENT/PLAN:  
1) Health maintenance:  
Pap/HPV up to date.  
Mammogram ordered.  
Nutrition, exercise and routine health maintenance exams reviewed.  
2) Contraception: vasectomy. Contraceptive options reviewed and information 
provided.  
3) STD screening: Declined STD check.  
4) Follow up one year or sooner as needed  
5) reviewed options for perimenopausal symptoms hormonal vs non hormonal.  
  
Shani Walsh MD  
  
Electronically signed by Shani House MD at 2024 9:54 AM EDT  
  
documented in this encounterTriHealth03- NoteHNO ID: [id number]  
Author: KRYSTA ALVARADO RT(CLYDE)  
Service: ?  
Author Type: Technician  
Type: Progress Notes  
Filed: 2024 15:33  
Note Text:  
Radiology Service Progress Note  
PATIENT NAME: Maggy Carpio  
MRN: [id number]  
DATE OF SERVICE: 2024  
TIME: 3:33 PM  
PATIENT IDENTITY VERIFICATION COMPLETED USING TWO (2) IDENTIFIERS: Name and  
Date of Birth confirmed by patient verbally.  
FALL SCREENING: Has the patient had 2 falls in the last year or 1 fall with  
injury or currently using an Ambulatory Assistive Device (Walker, Cane,  
Wheelchair, Crutches, etc.)? No  
PATIENT GENDER DATA: Female. Pregnancy status: Pregnant: No Breastfeeding  
status: NO.  
PATIENT RELEVANT IMPLANT DATA REVIEWED: Yes  
PATIENT PRESENTS WITH AN IMPLANTABLE OR ATTACHED MEDICAL DEVICE: No  
RADIOLOGY DEPARTMENT: CT; Exam(s) Completed: Pelvis  
PERIPHERAL IV DATA: Not applicable  
SIGNED BY: RT Murphy(CLYDE)  
2024 3:33 [facility]03- History of Present 
illness Narrative* Krysta Alvarado RT(R) - 2024 3:20 PM EST
  Formatting of this note might be different from the original.  
Radiology Service Progress Note  
  
PATIENT NAME: Maggy Carpio  
MRN: [id number]  
  
DATE OF SERVICE: 2024  
TIME: 3:33 PM  
PATIENT IDENTITY VERIFICATION COMPLETED USING TWO (2) IDENTIFIERS: Name and Date
of Birth confirmedby patient verbally.  
FALL SCREENING: Has the patient had 2 falls in the last year or 1 fall with 
injury or currently using an Ambulatory Assistive Device (Walker, Cane, 
Wheelchair, Crutches, etc.)? No  
PATIENT GENDER DATA: Female. Pregnancy status: Pregnant: No Breastfeeding 
status: NO.  
PATIENT RELEVANT IMPLANT DATA REVIEWED: Yes  
PATIENT PRESENTS WITH AN IMPLANTABLE OR ATTACHED MEDICAL DEVICE: No  
  
RADIOLOGY DEPARTMENT: CT; Exam(s) Completed: Pelvis  
  
PERIPHERAL IV DATA: Not applicable  
  
SIGNED BY: RT Murphy(CLYDE)  
2024 3:33 PM  
  
  
Electronically signed by Krysta Alvarado RT(R) at 2024 3:33 PM EST  
  
documented in this encounterTriHealth02- Miscellaneous Notes* 
  Telephone Encounter - Karina Rm RN - 2023 1:43 PM ESTFormatting 
  of this note might be different from the original.  
Lab order faxed to [facility]  
Electronically signed by Karina Rm RN at 2023 1:43 PM EST  
  
* Telephone Encounter - Shani Sanchez MD - 2023 1:12 PM EST
  Formatting of this note might be different from the original.  
Dx codes placed- order in box ready to be faxed.  
Electronically signed by Shani Sanchez MD at 2023 1:12 PM EST  
  
* Telephone Encounter - Shani Sanchez MD - 2023 10:50 AM EST
  Formatting of this note might be different from the original.  
What recent change so I can put a diagnosis code for the labs?  
Electronically signed by Shani Sanchez MD at 2023 10:50 AM EST  
  
* Telephone Encounter - Karina Rm RN - 2023 9:58 AM ESTFormatting 
  of this note might be different from the original.  
Lab order sheet to DM if agreeable  
Electronically signed by Karina Rm RN at 2023 10:05 AM EST  
  
documented in this encounterTriHealth01- Progress note  
  
  
  
  
                                        Author              Dr. Valero  
[facility]  
2023 4:55pm  
   
                                        Note Date/Time      2023 4  
:14pm  
   
                                                    [facility]  
[facility]  
[address] [place] [address]. [address]  
[place] [address]  
[phone]  
  
OFFICE VISIT  
Date of Service: 23  
  
MR#: [id number] Acct: [id number]  
  
Name: MAGGY CARPIO Rep #: [id number]  
[id number]  
: 1975 Provider: Dr. Markie Valero MD  
Age/Sex: 47/F Location: [facility]  
  
Status: Signed  
  
Intake  
Vital Signs  
  
  
22  
07:10  
  
Height 5 ft 10 in  
  
  
Intake  
Visit Reasons: PILONIDAL CYST  
Chief Complaint: Check Pilonidal Abscess  
 Required: No  
Allergies  
  
No Known Allergies Allergy (Verified 23 16:11)  
  
  
  
Medications  
  
B-complex with vitamin C 1 ea PO DAILY 17 [History Confirmed 23]  
cyanocobalamin (vitamin B-12) 500 mcg tablet (Vitamin B-12) 800 mcg PO DAILY 
17   
[History Confirmed 23]  
omega-3 fatty acids-fish oil 340 mg-1,000 mg capsule (Fish Oil) 2 ea PO DAILY   
17 [History Confirmed 23]  
mupirocin 2 % topical ointment 1 applic topical .QDAILY #2 tubes 18 [Rx   
Confirmed 23]  
ascorbate calcium (vitamin C) 500 mg tablet 500 mg PO DAILY 22 [History   
Confirmed 23]  
ashwagandha root extract 300 mg capsule 300 mg PO DAILY 22 [History 
Confirmed   
23]  
cholecalciferol (vitamin D3) 125 mcg (5,000 unit) capsule 125 mcg PO DAILY 
22   
[History Confirmed 23]  
magnesium oxide 400 mg PO DAILY 22 [History Confirmed 23]  
zinc gluconate 30 mg tablet 30 mg PO DAILY 22 [History Confirmed 23]  
  
  
  
Subjective  
Details:  
Patient complains of very recent onset of recurrent shortness at the pilonidal 
I&D   
site.  
  
Objective  
Details:  
Right of the midline at the previous I&D site there appears to be erythema and   
fluctuance. I recommended to the patient that we perform a repeat I&D.  
  
She was taken to the procedure room placed prone on the table. Cristi midline 
raphae   
sacrococcygeal area is prepped with Betadine. 1% lidocaine mixed 50-50 with 0.5%
  
Marcaine was used as local anesthetic. 11 blade was used to reopen atthe 
previous I&D   
site. Small amount of liquidy purulence emanated there from. I then did some 
sharp   
scissor debridement of some tissue. Placed some gauze. She tolerated procedure 
well  
  
She will now initiate once daily quarter inch Nu Gauze packing. We will see 
herin the   
office next week.  
  
Faye Valero M.D., F.A.C.S.  
  
Coding  
Level of Care Code  
Global Post Op  
  
Diagnoses  
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  
  
[facility]  
Medical History (Updated 23 @ 16:12 by April Aguilar)  
  
Abscess  
Anxiety  
Hand, foot and mouth disease  
Heartburn  
History of drainage of abscess (~2022)  
History of IBS  
Migraine headache  
Non-smoker  
Pilonidal abscess of  cleft  
  
  
Surgical History (Updated 23 @ 16:14 by April Aguilar)  
  
History of ankle surgery  
History of arthroscopy of left knee  
History of colonoscopy  
History of esophagogastroduodenoscopy (EGD)  
History of incision and drainage  
History of laparoscopic cholecystectomy  
History of surgical removal of ganglion cyst  
History of umbilical hernia repair  
  
  
Family History (Reviewed 23 @ 16:12 by April Aguilar)  
Father Colon polyps  
Diabetes  
AAA (abdominal aortic aneurysm)  
Hypertension  
Mother Colon polyps  
Neuropathy  
  
  
Social History (Reviewed 23 @ 16:12 by April Aguilar)  
Smoking Status: Never smoker  
  
  
  
Assessment and Plan (No Qualifiers)  
Assessment and Plan  
(1) Pilonidal abscess of rubi cleft:  
Status: Acute  
Plan:  
Recurrent abscess of the rubi cleft I&D . I am concerned that she may require 
future   
more radical resection  
  
We will resume oral antibiotic therapy.  
  
Faye Valero M.D., F.A.C.S.  
  
  
23 [id number] <Electronically signed by Faye omer MD>  
Date ___________ __________________________________  
_  
Faye Valero MD  
  
  
  
  
  
  
Cosigner Signature: Date ___________ _____________________  
______________  
(if applicable)  
  
  
CC: ~  
   
  
[facility]  
Work Phone: [phone]- Miscellaneous Notes* Telephone Encounter - 
  Tabatha Brooks RN - 2023 1:36 PM ESTFormatting of this note might be 
  different from the original.  
Order faxed to [facility] radiology.  
Tabatha Brooks RN  
  
Electronically signed by Tabatha Brooks RN at 2023 1:36 PM EST  
  
* Telephone Encounter - Shani Sanchez MD - 2023 12:27 PM EST
  Formatting of this note might be different from the original.  
ordered  
Electronically signed by Shani Sanchez MD at 2023 12:28 PM EST  
  
* Telephone Encounter - Tabatha Brooks RN - 2023 11:47 AM ESTFormatting of
  this note might be different from the original.  
DM reviewed breast imaging report from [facility]. Called patient and she
is aware of needing 6 month follow up diagnostic imaging of left breast. Order 
form to DM to sign. Will fax to [facility] once signed.  
Tabatha Brooks RN  
  
Electronically signed by Tabatha Brooks RN at 2023 11:54 AM EST  
  
documented in this encounterTriHealth10- Instructions* Patient 
  Instructions*   
  
Shani Sanchez MD - 10/28/2022 9:16 AM EDT  
  
Formatting of this note is different from the original.  
Images from the original note were not included.  
  
  
Miralax/Dulcolax Bowel Prep  
For this bowel preparation, you will need to purchase the following medications 
at any pharmacy:  
Over the counter Miralax (generic name is polyethylene glycol) 8.3 oz or 238 
grams  
Four (4) Dulcolax (generic name is Bisacodyl) tablets  
3 days prior to your procedure, you need to be on a low fiber diet (Such as 
popcorn, beans, seeds, nuts, salad and raw vegetables, corn, fresh and dried 
fruit and multi-grain bread)  
YOU MUST BE ON CLEAR LIQUIDS FOR 2 FULL DAYS PRIOR TO YOUR COLONOSCOPY  
  
_____________Day one which would be two days before your colonoscopy, you will 
need to be on clear liquids all day.  
You may have coffee or tea-black only (no cream), clear broths (beef, chicken or
vegetable), apple juice, white grape juice, pop, Gatorade, Powerade, lemonade, 
Jello, popsicles, Michael-aid, and water-But nothing red or dark purple in color 
and no dairy products, tomato or orange juices.  
  
_____________Day two which would be the day before your colonoscopy continue 
clear liquids all day as above. And follow the instructions below:  
  
8:00 AM - Mix the Miralax with 64 oz of Gatorade or another clear liquid of 
choice and place in refrigerator. Most people say the drink is better cold.  
4:00 PM - Take 2 of the Dulcolax tablets with 8 oz of water.  
6:00 PM - Start to drink the Miralax mixture. You must finish it by midnight.  
8:00 PM - Take the other 2 Dulcolax tablets with 8 oz of water.  
You may continue to drink clear liquids while you are taking your prep and after
you finish it as long as it is before midnight. Drink lots of fluids so you don 
t become dehydrated.  
  
Nothing to drink after midnight the night before the procedure unless you are 
instructed differently by the physician or nurses. Please remember to take your 
normal medications the morning of the procedure with a small sip of water 
especially your blood pressure medications. If you are diabetic, you need to 
contact your physician about how to take your diabetic medications and/or 
insulin during the prepping period and the day of your procedure.  
  
Any questions please call:  
Dr. Betts or Dr. Rich [phone]  
Araceli Jauregui [phone]  
Dr. Holley [phone]  
[facility] nurses [phone]  
  
  
Electronically signed by Shani Sanchez MD at 10/28/2022 9:16 AM EDT  
  
  
  
documented in this encounterTriHealth10- History of Present 
illness Narrative* Shani Sanchez MD - 10/28/2022 8:07 AM EDT
  Formatting of this note is different from the original.  
Maggy is a 46 year old  who presents for an annual gynecologic exam with 
questions about premenopausal symptoms and incontinence issues. Working at 
[email or web address] as surgical tech.  
  
Menses: Becoming more irregular the past 6 months. Menses occurring more 
frequently, sometimes 2x/month. Lasts 3-5 days. Lighter flow than previous 
[facility]  
Contraception: vasectomy  
HPV vaccine: No  
Last Pap: 2018 normal  
HPV: 2018 negative  
History of abnormal pap: No  
Last mammogram:  normal  
Sexually active: Yes  
History of STDS: None  
Patient concerns for STD exposure: No.  
Pain with intercourse: No  
Postcoital bleeding: No  
Hot flashes: Yes, 5-10x/month for last 6 months  
Night sweats: No  
Vaginal dryness: No  
Mood swings: Yes, more prominent in last 6 months  
Exercise: 3 times a week for 45-60 minutes. Type: R 2nd metatarsal Fx. So mostly
resistance training  
Diet: Mixed, working with RD, dairy free diet.  
  
OB History  
 T0  L0  
SAB0 IAB0 Ectopic0 Multiple0 Live Births0  
  
Gyn History  
  
LMP: 2021, Having periods  
Age at Menarche:  
Age at First Pregnancy:  
Age at Menopause:  
Gyn History Comments:  
Sexual Activity: Yes; Male  
Contraception: Vasectomy  
  
  
  
PAST MEDICAL HISTORY  
Diagnosis Date  
Lactose intolerance  
Raynaud disease  
  
PAST SURGICAL HISTORY  
Procedure Laterality Date  
ANKLE ARTHROSCOPY Right 2018  
COLONOSCOPY W/BIOPSY SINGLE/MULTIPLE 13  
EGD TRANSORAL BIOPSY SINGLE/MULTIPLE 13  
KNEE ARTHROSCOPY Left 2017  
LAP MARVA W EXPLORATION OF CD 2012  
PAST SURGICAL HISTORY OF  
ganglion cyst on wrist x3  
REPAIR UMBILICAL HERNIA 1982  
SKIN BX, 1 LESION 14  
Punch bx right gluteal fold skin lesion  
  
FAMILY HISTORY  
Problem Relation Age of Onset  
Diabetes Father  
other (myasthenia gravis) Father  
SOCIAL HISTORY  
Social History  
  
Tobacco Use  
Smoking status: Never  
Smokeless tobacco: Never  
Vaping Use  
Vaping Use: Never used  
Substance Use Topics  
Alcohol use: Yes  
Comment: 3x monthly  
  
REVIEW OF SYSTEMS  
Abdomen: Admits to occasional diarrhea. No abdominal pain, nausea, vomiting, or 
constipation. No bloating, early satiety, indigestion, or increased flatulence.  
Bladder: Admits to stress incontinence. Not causing significant distress. No pad
use. No dysuria, gross hematuria, burning with urination, urinary frequency, 
urinary urgency.  
Breast: No breast lumps, nipple d/c, overlying skin changes, redness or skin 
retraction.  
Allergies and current medication updated:No  
  
EXAM: LMP 2021  
  
  
GENERAL: pleasant,  female in no apparent distress  
HEENT: Normocephalic, atraumatic, mucus membranes moist, and no lesions  
NECK: Supple, full range of motion, no adenopathy, and thyroid normal  
DERMATOLOGY: Normal, without lesions, non-icteric, and non-hirsute  
BREAST: soft, non-tender, symmetric, no dominant mass, normal nipple-areolar 
complex, no lymphadenopathy, and no nipple discharge  
ABDOMEN: soft, non-tender, and no masses  
PELVIC: external genitalia normal, normal Bartholin's glands, urethra, [place]'s 
glands, no vulvar lesions, no cervical lesions, cervical prolpase grade 1, 
physiologic discharge present, normal appearing perineal body and perianal 
region  
BIMANUAL: uterus normal size, shape and consistency, no adnexal masses, non-
tender, and ?? Fibroid posteriorly  
RECTOVAGINAL: deferred.  
NEURO: alert and oriented x3,exam grossly non-focal  
EXTREMITIES: normal  
  
ASSESSMENT/PLAN:  
1) Health maintenance:  
Pap done with HPV.  
Mammogram ordered.  
Nutrition, exercise and routine health maintenance exams reviewed.  
Calcium/Vitamin D supplementation information provided.  
Lipids/glucose: ordered  
Vitamin D: ordered  
2) Contraception: vasectomy. Contraceptive options reviewed and information 
provided.  
3) STD screening: Declined STD check.  
4) Follow up one year or sooner as needed  
5) pelvic us for AUB  
  
Shani Walsh MD  
  
Electronically signed by Shani Sanchez MD at 10/28/2022 9:16 AM EDT  
  
* Shweta Paz Ma - 10/28/2022 7:59 AM EDTFormatting of this note might be 
  different from the original.  
Chaperone offered: Patient declines.  
  
Electronically signed by Shweta Paz Ma at 10/28/2022 9:16 AM EDT  
  
documented in [facility]Tri[facility]06- NotePROCEDURE: XR FOOT RT
MIN 3 VIEWS, XR ANKLE RT MIN 3 VIEWS  
COMPARISON: None.  
HISTORY: Pain in right foot  
FINDINGS:  
BONES:No acute fracture or dislocation. No focal lytic or sclerotic changes.  
Mild enthesopathic spurring of the calcaneus at the Achilles and plantar  
insertions.  
SOFT TISSUES:Negative. No visible soft tissue swelling.  
EFFUSION:None visible.  
OTHER: Negative.  
IMPRESSION:  
No acute bony abnormality of the foot or ankle  
Enthesopathic spurring of the calcaneus  
Electronically authenticated by: SARWAT CARRILLO Date: 2022 17:07[facility]06- NotePROCEDURE: XR FOOT RT MIN 3 VIEWS, XR ANKLE RT MIN 3 
VIEWS  
COMPARISON: None.  
HISTORY: Pain in right foot  
FINDINGS:  
BONES:No acute fracture or dislocation. No focal lytic or sclerotic changes.  
Mild enthesopathic spurring of the calcaneus at the Achilles and plantar  
insertions.  
SOFT TISSUES:Negative. No visible soft tissue swelling.  
EFFUSION:None visible.  
OTHER: Negative.  
IMPRESSION:  
No acute bony abnormality of the foot or ankle  
Enthesopathic spurring of the calcaneus  
Electronically authenticated by: SARWAT CARRILLO Date: 2022 17:07[facility] noteNo assessment information availableWK[facility]  
Work Phone: [phone] note*   
  
                                                    Diagnosis  
   
                                                      
  
  
Encounter for gynecological examination (general) (routine) without abnormal   
findings- Primary  
   
                                                      
  
  
Encounter for screening mammogram for malignant neoplasm of breast  
  
  
Other screening mammogram  
   
                                                      
  
  
Routine medical exam  
  
  
Routine general medical examination at a health care facility  
   
                                                      
  
  
Screening for thyroid disorder  
   
                                                      
  
  
Screening for deficiency anemia  
  
  
Screening for other and unspecified deficiency anemia  
   
                                                      
  
  
Screening cholesterol level  
  
  
Screening for lipoid disorders  
   
                                                      
  
  
Encounter for vitamin deficiency screening  
  
  
Screening for other and unspecified endocrine, nutritional, metabolic, and 
immunity   
disorders  
   
                                                      
  
  
Encounter for screening for diabetes mellitus  
  
  
Screening for diabetes mellitus  
   
                                                      
  
  
Abnormal uterine bleeding (AUB)  
   
                                                      
  
  
Screening for cervical cancer  
  
  
Screening for malignant neoplasm of the cervix  
   
                                                      
  
  
Encounter for screening for human papillomavirus (HPV)  
  
  
Special screening examination for human papillomavirus (HPV)  
   
                                                      
  
  
Screen for colon cancer  
  
  
Special screening for malignant neoplasms, colon  
   
                                                      
  
  
Special screening for malignant neoplasms, colon  
  
documented in this encounter  
[facility]   
  
                                                    Diagnosis  
   
                                                      
  
  
DUB (dysfunctional uterine bleeding)- Primary  
  
  
Other disorder of menstruation and other abnormal bleeding from female genital 
tract  
  
documented in this encounter  
[facility]*   
  
                          Diagnosis    Onset Date   Resolution   Status  
   
                          Pilonidal abscess of rubi cleft                        
     [facility]  
Work Phone: [phone] note*   
  
                          Diagnosis    Onset Date   Resolution   Status  
   
                          Pilonidal abscess of  cleft                        
     acute  
   
                          Pilonidal abscess of  cleft                        
     [facility]  
Work Phone: [phone] note*   
  
                          Diagnosis    Onset Date   Resolution   Status  
   
                          Pilonidal abscess of  cleft                        
     acute  
   
                          Pilonidal abscess of  cleft                        
     acute  
   
                          Encounter for screening for malignant neoplasm of colo  
n                           [facility]  
Work Phone: [phone] note*   
  
                                                    Diagnosis  
   
                                                      
  
  
Abnormal uterine bleeding (AUB)  
  
documented in this encounter  
[facility] note*   
  
                          Diagnosis    Onset Date   Resolution   Status  
   
                          Pilonidal abscess of rubi cleft                        
     acute  
   
                          Pilonidal abscess of rubi cleft                        
     acute  
   
                          Encounter for screening for malignant neoplasm of colo  
n                           acute  
   
                          Pilonidal abscess of rubi cleft                        
     [facility]  
Work Phone: [phone] note*   
  
                          Diagnosis    Onset Date   Resolution   Status  
   
                          Pilonidal abscess of  cleft                        
     acute  
   
                          Pilonidal abscess of  cleft                        
     acute  
   
                          Encounter for screening for malignant neoplasm of colo  
n                           acute  
   
                          Pilonidal abscess of rubi cleft                        
     acute  
   
                          Pilonidal abscess of  cleft                        
     acute  
   
                          Pilonidal abscess of rubi cleft                        
     acute  
   
                          Pilonidal abscess of  cleft                        
     [facility]  
Work Phone: [phone] note*   
  
                          Diagnosis    Onset Date   Resolution   Status  
   
                          Pilonidal abscess of rubi cleft                        
     acute  
   
                          Pilonidal abscess of  cleft                        
     acute  
   
                          Encounter for screening for malignant neoplasm of colo  
n                           acute  
   
                          Pilonidal abscess of rubi cleft                        
     acute  
   
                          Pilonidal abscess of  cleft                        
     acute  
   
                          Pilonidal abscess of  cleft                        
     acute  
   
                          Pilonidal abscess of  cleft                        
     acute  
   
                          Pilonidal abscess of rubi cleft                        
     acute  
   
                          Pilonidal abscess of rubi cleft                        
     acute  
   
                          Pilonidal abscess of  cleft                        
     [facility]  
Work Phone: [phone] note*   
  
                                                    Diagnosis  
   
                                                      
  
  
Encounter for gynecological examination (general) (routine) without abnormal   
findings- Primary  
   
                                                      
  
  
Encounter for screening mammogram for breast cancer  
  
documented in this encounter  
[facility]   
  
                          Diagnosis    Onset Date   Resolution   Status  
   
                                                    Inguinal hernia of right josafat  
e without obstruction or   
gangrene                                                    acute  
   
                          Left groin pain                           acute  
   
                          Pilonidal abscess of rubi cleft                        
     [facility]  
Work Phone: [phone] note*   
  
                                                    Diagnosis  
   
                                                      
  
  
Encounter for screening mammogram for breast cancer  
  
documented in this encounter  
[facility]   
  
                                                    Diagnosis  
   
                                                      
  
  
Encounter for screening for nutritional disorder  
  
documented in this encounter  
[facility]  
Work Phone: [phone] for referral (narrative)* Outpatient Procedure 
  (Routine) - Pending Review  
  
                          Specialty    Diagnoses / Procedures Referred By Contac  
t Referred To Contact  
   
                                                    [facility]   
INST[facility]                                 
  
  
Diagnoses  
  
  
Screen for colon cancer  
  
  
  
Procedures  
  
  
COLONOSCOPY SCREENING  
  
  
COLONOSCOPY FLX DX   
W/COLLJ SPEC WHEN PFRMD                   
  
  
Shani House MD  
  
  
[address]  
  
  
[place] [address]  
  
  
Phone: [phone]  
  
  
Fax: [phone]                         
  
  
[facility]   
[facility]  
  
  
[address]  
  
  
[place] [address]  
  
  
  
                          Referral ID  Status       Reason       Start   
Date                                    Expiration   
Date                                    Visits   
Requested                               Visits   
Authorized  
   
                                        [id number]            Pending   
Review                                    
  
  
Auto-Generat  
ed Referral                             10/28/202  
2                   10/28/2023          1                   1  
  
  
  
  
Electronically signed by Shani Sanchez MD at 10/28/2022 9:15 AM EDT  
  
  
* Diagnostic Procedure Only (Routine) - Pending Review  
  
                          Specialty    Diagnoses / Procedures Referred By Contac  
t Referred To Contact  
   
                                                    [facility]                                 
  
  
Diagnoses  
  
  
Abnormal uterine   
bleeding (AUB)  
  
  
  
Procedures  
  
  
PELVIC US WHI  
  
  
US PELVIC NONOBSTETRIC   
REAL-TIME IMAGE COMPLETE                  
  
  
Shani House  [address]  
  
  
[place] [address]  
  
  
Phone: [phone]  
  
  
Fax: [phone]                         
  
  
[facility]  
  
  
[address] [place] [address]  
  
  
  
                          Referral ID  Status       Reason       Start   
Date                                    Expiration   
Date                                    Visits   
Requested                               Visits   
Authorized  
   
                                        [id number]            Pending   
Review                                    
  
  
Auto-Generat  
ed Referral                             10/28/202  
2                   10/28/2023          1                   1  
  
  
  
  
Electronically signed by Shani Sanchez MD at 10/28/2022 8:43 AM EDT  
  
  
* Diagnostic Procedure Only (Routine) - Pending Review  
  
                          Specialty    Diagnoses / Procedures Referred By Contac  
t Referred To Contact  
   
                                        [facility]            
  
  
Diagnoses  
  
  
Encounter for screening   
mammogram for malignant   
neoplasm of breast  
  
  
  
Procedures  
  
  
EMILY SCREENING W CLAUDETTE  
  
  
SCREENING DIGITAL BREAST   
TOMOSYNTHESIS BI  
  
  
SCREENING MAMMOGRAPHY BI   
2-VIEW BREAST INC CAD                     
  
  
Shani House MD  
  
  
[address]  
  
  
[place] [address]  
  
  
Phone: [phone]  
  
  
Fax: [phone]                         
  
  
[facility]  
  
  
[address] [place]   
[address]  
  
  
  
                          Referral ID  Status       Reason       Start   
Date                                    Expiration   
Date                                    Visits   
Requested                               Visits   
Authorized  
   
                                        [id number]            Pending   
Review                                    
  
  
Auto-Generat  
ed Referral                             10/28/202  
2                   2023          1                   1  
  
  
  
  
Electronically signed by Shani Sanchez MD at 10/28/2022 8:07 AM EDT  
  
  
[facility] Diagnostic Procedure Only 
  (Routine) - Authorized  
  
                          Specialty    Diagnoses / Procedures Referred By Contac  
t Referred To Contact  
   
                                        [facility]            
  
  
Diagnoses  
  
  
Encounter for screening   
mammogram for breast cancer  
  
  
  
Procedures  
  
  
EMILY SCREENING W CLAUDETTE  
  
  
SCREENING DIGITAL BREAST   
TOMOSYNTHESIS BI  
  
  
SCREENING MAMMOGRAPHY BI   
2-VIEW BREAST INC CAD                     
  
  
NeShani Larson MD  
  
  
[address]  
  
  
[place] [address]  
  
  
Phone: [phone]  
  
  
Fax: [phone]                         
  
  
[facility]  
  
  
[address] [place]   
[address]  
  
  
  
                          Referral ID  Status       Reason       Start   
Date                                    Expiration   
Date                                    Visits   
Requested                               Visits   
Authorized  
   
                                [id number]        Authorized        
  
  
Auto-Generat  
ed Referral     3/18/2024       2025       1               1  
  
  
  
  
Electronically signed by Shani Sanchez MD at 2024 9:04 AM EDT  
  
  
[facility] for visit Narrative* Diagnostic Procedure Only (Routine) 
  - Closed  
  
                          Specialty    Diagnoses / Procedures Referred By Contac  
t Referred To Contact  
   
                                                    [facility]                                 
  
  
Diagnoses  
  
  
Abnormal uterine   
bleeding (AUB)  
  
  
  
Procedures  
  
  
PELVIC US WHI  
  
  
US PELVIC NONOBSTETRIC   
REAL-TIME IMAGE COMPLETE                  
  
  
Shani House MD  
  
  
[address]  
  
  
[place] [address]  
  
  
Phone: [phone]  
  
  
Fax: [phone]                         
  
  
[facility]  
  
  
[address] [place] [address]  
  
  
  
                    Referral ID Status    Reason    Start Date Expiration Date V  
isits   
Requested                               Visits   
Authorized  
   
                                [id number]        Closed            
  
  
Auto-Generate  
d Referral      2022      1               1  
  
  
  
[facility] for visit Narrative* Diagnostic Procedure Only (Routine) 
  - Closed  
  
                          Specialty    Diagnoses / Procedures Referred By Contac  
t Referred To Contact  
   
                                                    Radiology / RADIO CT   
[facility]                             
  
  
Diagnoses  
  
  
*order in scan docs* pt   
says she will contact   
insurance for pre  
  
  
approval  
  
  
CT ABD/PEL W IVCON   
(possible hernia)  
  
  
K40.90 - Unilateral   
inguinal hernia,   
without obstruction or   
gangrene,  
  
  
not specified as   
recurrent  
  
  
Faye Valero MD  
  
  
  
Procedures  
  
  
CT ORAL PREP                              
  
  
Faye Valero MD  
  
  
[address]  
  
  
[place] [address]  
  
  
Phone: [phone]  
  
  
Fax: [phone]                         
  
  
Radio [address] Scan [facility]  
  
  
[address]  
  
  
[place] [address]  
  
  
Phone: [phone]  
  
  
Fax: [phone]  
  
  
  
                Referral ID Status  Reason  Start Date Expiration Date Visits Re  
quested Visits   
Authorized  
   
                [id number] Closed          2024 3/21/2024 2       2  
  
  
  
[facility] Only (Routine) 
  - Closed  
  
                          Specialty    Diagnoses / Procedures Referred By Contac  
t Referred To Contact  
   
                                        [facility]            
  
  
Diagnoses  
  
  
Encounter for screening   
mammogram for breast cancer  
  
  
  
Procedures  
  
  
EMILY SCREENING W CLAUDETTE  
  
  
SCREENING DIGITAL BREAST   
TOMOSYNTHESIS BI  
  
  
SCREENING MAMMOGRAPHY BI   
2-VIEW BREAST INC CAD                     
  
  
Shani House MD  
  
  
[address]  
  
  
[place] [address]  
  
  
Phone: [phone]  
  
  
Fax: [phone]                         
  
  
[facility]  
  
  
[address] [place]   
[address]  
  
  
  
                    Referral ID Status    Reason    Start Date Expiration Date V  
isits   
Requested                               Visits   
Authorized  
   
                                [id number]        Closed            
  
  
Auto-Generate  
d Referral      3/18/2024       2025       1               1  
  
  
  
[facility]  
  
Summary Purpose  
  
  
                                                      
  
  
  
Family History  
No Family History Records Found  
  
                          Relationship Condition    Age at Onset Recorded Date/T  
mabel  
   
                          father       Polyp of colon Unknown        
   
                                       Diabetes mellitus Unknown        
   
                                       Abdominal aortic aneurysm (AAA) Unknown    
      
   
                                       Hypertension Unknown        
   
                          mother       Polyp of colon Unknown        
   
                                       Neuropathy   Unknown        
  
  
  
Advance Directives  
No Advanced Directives Records Found  
  
                                Advance Directive Response        Recorded Date/  
Time  
   
                                Living Will     No              May 5th, 2017 11  
:08am  
   
                                Power of  No              May 5th, 2017   
11:08am  
  
  
  
                                Advance Directive Response        Recorded Date/  
Time  
   
                                Living Will     No              May 5th, 2017 10  
:08am  
   
                                Power of  No              May 5th, 2017   
10:08am  
  
  
  
                                Advance Directive Response        Recorded Date/  
Time  
   
                                Living Will     No              , 2  
022 2:41pm  
   
                                Power of  No              2022 2:41pm  
  
  
  
                                Advance Directive Response        Recorded Date/  
Time  
   
                                Living Will     No              , 2  
022 3:41pm  
   
                                Power of  No              2022 3:41pm  
  
  
  
                                Advance Directive Response        Recorded Date/  
Time  
   
                                Living Will     No              May 3rd, 2024 12  
:35pm  
   
                                Power of  No              May 3rd, 2024   
12:35pm  
  
  
  
Chief Complaint and Reason for Visit  
  
  
                                        Chief Complaint     HAND FOOT MOUTH  
  
  
  
                                        Chief Complaint     Amb Documentation  
abscess  
Amb Documentation  
   
                                        Reason for Visit    Pilonidal abscess of  
  cleft  
  
  
  
                                        Chief Complaint     Amb Documentation  
abscess  
Amb Documentation  
recheck pilonidal  
   
                                        Reason for Visit    Pilonidal abscess of  
  cleft  
Pilonidal abscess of  cleft  
  
  
  
                                        Chief Complaint     Amb Documentation  
abscess  
Amb Documentation  
recheck pilonidal  
   
                                        Reason for Visit    Pilonidal abscess of  
 rubi cleft  
Pilonidal abscess of rubi cleft  
Encounter for screening for malignant neoplasm of colon  
  
  
  
                                        Chief Complaint     Amb Documentation  
abscess  
Amb Documentation  
recheck pilonidal  
PILONIDAL CYST  
   
                                        Reason for Visit    Pilonidal abscess of  
  cleft  
Pilonidal abscess of  cleft  
Encounter for screening for malignant neoplasm of colon  
Pilonidal abscess of rubi cleft  
  
  
  
                                        Chief Complaint     Amb Documentation  
abscess  
Amb Documentation  
recheck pilonidal  
PILONIDAL CYST  
F/U Incsion and drainage pilonidal abscess   
F/U Incsion and drainage pilonidal abscess   
wound check  
WOUND CHECK  
   
                                        Reason for Visit    Pilonidal abscess of  
  cleft  
Pilonidal abscess of rubi cleft  
Encounter for screening for malignant neoplasm of colon  
Pilonidal abscess of  cleft  
Pilonidal abscess of  cleft  
Pilonidal abscess of rubi cleft  
Pilonidal abscess of rubi cleft  
  
  
  
                                        Chief Complaint     abscess  
Amb Documentation  
recheck pilonidal  
PILONIDAL CYST  
F/U Incsion and drainage pilonidal abscess   
F/U Incsion and drainage pilonidal abscess   
wound check  
Check pilonidal  
   
                                        Reason for Visit    Pilonidal abscess of  
  cleft  
Pilonidal abscess of rubi cleft  
Encounter for screening for malignant neoplasm of colon  
Pilonidal abscess of  cleft  
Pilonidal abscess of  cleft  
Pilonidal abscess of rubi cleft  
Pilonidal abscess of  cleft  
Pilonidal abscess of  cleft  
Pilonidal abscess of rubi cleft  
Pilonidal abscess of rubi cleft  
  
  
  
                                        Chief Complaint     Possible hernia  
  
  
  
                                        Chief Complaint     Possible hernia  
I&D of pilonidal cyst  
   
                                        Reason for Visit    Inguinal hernia of r  
ight side without obstruction or gangrene  
Left groin pain  
Pilonidal abscess of rubi cleft  
  
  
  
                                        Chief Complaint     Possible hernia  
I&D of pilonidal cyst  
UPDATE H&P  
   
                                        Reason for Visit    Inguinal hernia of r  
ight side without obstruction or gangrene  
Left groin pain  
Pilonidal abscess of  cleft  
  
  
  
Reason for Referral  
  
  
                          Specialty    Diagnoses / Procedures Referred By Contac  
t Referred To Contact  
   
                                        Radiology             
  
  
Diagnoses  
  
  
Encounter for screening for   
nutritional disorder  
  
  
  
Procedures  
  
  
CT cardiac scoring wo IV   
contrast                                  
  
  
Ranney, Christopher Behr,  [address]  
  
  
[address]  
  
  
[place] [address]  
  
  
Phone: [phone]  
  
  
Fax: [phone]                         
  
  
  
  
  
                          Referral ID  Status       Reason       Start   
Date                                    Expiration   
Date                                    Visits   
Requested                               Visits   
Authorized  
   
                                        [id number]             Pending   
Review                                    
  
  
Perform   
Procedure       2024       1               1  
  
  
  
Additional Source Comments  
  
  
  
                                                    INFORMATION SOURCE (unrecogn  
ized section and content)  
   
                                          
  
  
  
                                        DATE CREATED        AUTHOR  
   
                                2020                      [facility]  
  
  
  
                                DATE CREATED    AUTHOR          AUTHOR'S ORGANIZ  
ATION  
   
                                2022                      [facility]  
  
  
  
                                DATE CREATED    AUTHOR          AUTHOR'S ORGANIZ  
ATION  
   
                                2023                      [facility]  
  
  
  
                                DATE CREATED    AUTHOR          AUTHOR'S ORGANIZ  
ATION  
   
                                2024                      [facility]  
  
  
  
                                DATE CREATED    AUTHOR          AUTHOR'S ORGANIZ  
ATION  
   
                                2024                      [facility]  
  
  
  
                                DATE CREATED    AUTHOR          AUTHOR'S ORGANIZ  
ATION  
   
                                2024                      [facility]  
  
  
  
  
  
                                                    Goals (unrecognized section   
and content)  
   
                                                    Goals may be documented in a  
n alternate sectionGoals may be documented in an   
alternate sectionGoals may be documented in an alternate sectionGoals may be   
documented in an alternate sectionGoals may be documented in an alternate   
sectionGoals may be documented in an alternate section  
  
  
  
  
                                                    Source Comments (unrecognize  
d section and content)  
   
                                                    In the event this informatio  
n is protected by the Federal Confidentiality of   
Alcohol   
and Drug Abuse Patient Records regulations: This information has been disclosed 
to   
you from records protected by Federal confidentiality rules (42 CFR Part 2). The
  
Federal rules prohibit you from making any further disclosure of this 
information   
unless further disclosure is expressly permitted by the written consent of the 
person   
to whom it pertains or as otherwise permitted by 42 CFR Part 2. A general   
authorization for the release of medical or other information is NOT sufficient 
for   
this purpose. The Federal rules restrict any use of the information to 
criminally   
investigate or prosecute any alcohol or drug abuse patient.[facility]HealthIn 
the   
event this information is protected by the Federal Confidentiality of Alcohol 
and   
Drug Abuse Patient Records regulations: This information has been disclosed to 
you   
from records protected by Federal confidentiality rules (42 CFR Part 2). The 
Federal   
rules prohibit you from making any further disclosure of this information unless
  
further disclosure is expressly permitted by the written consent of the person 
to   
whom it pertains or as otherwise permitted by 42 CFR Part 2. A general 
authorization   
for the release of medical or other information is NOT sufficient for this 
purpose.   
The Federal rules restrict any use of the information to criminally investigate 
or   
prosecute any alcohol or drug abuse patient.[facility]HealthIn the event this   
information is protected by the Federal Confidentiality of Alcohol and Drug 
Abuse   
Patient Records regulations: This information has been disclosed to you from 
records   
protected by Federal confidentiality rules (42 CFR Part 2). The Federal rules   
prohibit you from making any further disclosure of this information unless 
further   
disclosure is expressly permitted by the written consent of the person to whom 
it   
pertains or as otherwise permitted by 42 CFR Part 2. A general authorization for
the   
release of medical or other information is NOT sufficient for this purpose. The   
Federal rules restrict any use of the information to criminally investigate or   
prosecute any alcohol or drug abuse patient.[facility]HealthIn the event this   
information is protected by the Federal Confidentiality of Alcohol and Drug 
Abuse   
Patient Records regulations: This information has been disclosed to you from 
records   
protected by Federal confidentiality rules (42 CFR Part 2). The Federal rules   
prohibit you from making any further disclosure of this information unless 
further   
disclosure is expressly permitted by the written consent of the person to whom 
it   
pertains or as otherwise permitted by 42 CFR Part 2. A general authorization for
the   
release of medical or other information is NOT sufficient for this purpose. The   
Federal rules restrict any use of the information to criminally investigate or   
prosecute any alcohol or drug abuse patient.[facility]HealthIn the event this   
information is protected by the Federal Confidentiality of Alcohol and Drug 
Abuse   
Patient Records regulations: This information has been disclosed to you from 
records   
protected by Federal confidentiality rules (42 CFR Part 2). The Federal rules   
prohibit you from making any further disclosure of this information unless 
further   
disclosure is expressly permitted by the written consent of the person to whom 
it   
pertains or as otherwise permitted by 42 CFR Part 2. A general authorization for
the   
release of medical or other information is NOT sufficient for this purpose. The   
Federal rules restrict any use of the information to criminally investigate or   
prosecute any alcohol or drug abuse patient.TriHealthIn the event this   
information is protected by the Federal Confidentiality of Alcohol and Drug 
Abuse   
Patient Records regulations: This information has been disclosed to you from 
records   
protected by Federal confidentiality rules (42 CFR Part 2). The Federal rules   
prohibit you from making any further disclosure of this information unless 
further   
disclosure is expressly permitted by the written consent of the person to whom 
it   
pertains or as otherwise permitted by 42 CFR Part 2. A general authorization for
the   
release of medical or other information is NOT sufficient for this purpose. The   
Federal rules restrict any use of the information to criminally investigate or   
prosecute any alcohol or drug abuse patient.TriHealthIn the event this   
information is protected by the Federal Confidentiality of Alcohol and Drug 
Abuse   
Patient Records regulations: This information has been disclosed to you from 
records   
protected by Federal confidentiality rules (42 CFR Part 2). The Federal rules   
prohibit you from making any further disclosure of this information unless 
further   
disclosure is expressly permitted by the written consent of the person to whom 
it   
pertains or as otherwise permitted by 42 CFR Part 2. A general authorization for
the   
release of medical or other information is NOT sufficient for this purpose. The   
Federal rules restrict any use of the information to criminally investigate or   
prosecute any alcohol or drug abuse patient.TriHealthIn the event this   
information is protected by the Federal Confidentiality of Alcohol and Drug 
Abuse   
Patient Records regulations: This information has been disclosed to you from 
records   
protected by Federal confidentiality rules (42 CFR Part 2). The Federal rules   
prohibit you from making any further disclosure of this information unless 
further   
disclosure is expressly permitted by the written consent of the person to whom 
it   
pertains or as otherwise permitted by 42 CFR Part 2. A general authorization for
the   
release of medical or other information is NOT sufficient for this purpose. The   
Federal rules restrict any use of the information to criminally investigate or   
prosecute any alcohol or drug abuse patient.TriHealthIn the event this   
information is protected by the Federal Confidentiality of Alcohol and Drug 
Abuse   
Patient Records regulations: This information has been disclosed to you from 
records   
protected by Federal confidentiality rules (42 CFR Part 2). The Federal rules   
prohibit you from making any further disclosure of this information unless 
further   
disclosure is expressly permitted by the written consent of the person to whom 
it   
pertains or as otherwise permitted by 42 CFR Part 2. A general authorization for
the   
release of medical or other information is NOT sufficient for this purpose. The   
Federal rules restrict any use of the information to criminally investigate or   
prosecute any alcohol or drug abuse patient.TriHealthIn the event this   
information is protected by the Federal Confidentiality of Alcohol and Drug 
Abuse   
Patient Records regulations: This information has been disclosed to you from 
records   
protected by Federal confidentiality rules (42 CFR Part 2). The Federal rules   
prohibit you from making any further disclosure of this information unless 
further   
disclosure is expressly permitted by the written consent of the person to whom 
it   
pertains or as otherwise permitted by 42 CFR Part 2. A general authorization for
the   
release of medical or other information is NOT sufficient for this purpose. The   
Federal rules restrict any use of the information to criminally investigate or   
prosecute any alcohol or drug abuse patient.TriHealthIn the event this   
information is protected by the Federal Confidentiality of Alcohol and Drug 
Abuse   
Patient Records regulations: This information has been disclosed to you from 
records   
protected by Federal confidentiality rules (42 CFR Part 2). The Federal rules   
prohibit you from making any further disclosure of this information unless 
further   
disclosure is expressly permitted by the written consent of the person to whom 
it   
pertains or as otherwise permitted by 42 CFR Part 2. A general authorization for
the   
release of medical or other information is NOT sufficient for this purpose. The   
Federal rules restrict any use of the information to criminally investigate or   
prosecute any alcohol or drug abuse patient.TriHealthIn the event this   
information is protected by the Federal Confidentiality of Alcohol and Drug 
Abuse   
Patient Records regulations: This information has been disclosed to you from 
records   
protected by Federal confidentiality rules (42 CFR Part 2). The Federal rules   
prohibit you from making any further disclosure of this information unless 
further   
disclosure is expressly permitted by the written consent of the person to whom 
it   
pertains or as otherwise permitted by 42 CFR Part 2. A general authorization for
the   
release of medical or other information is NOT sufficient for this purpose. The   
Federal rules restrict any use of the information to criminally investigate or   
prosecute any alcohol or drug abuse patient.TriHealthIn the event this   
information is protected by the Federal Confidentiality of Alcohol and Drug 
Abuse   
Patient Records regulations: This information has been disclosed to you from 
records   
protected by Federal confidentiality rules (42 CFR Part 2). The Federal rules   
prohibit you from making any further disclosure of this information unless 
further   
disclosure is expressly permitted by the written consent of the person to whom 
it   
pertains or as otherwise permitted by 42 CFR Part 2. A general authorization for
the   
release of medical or other information is NOT sufficient for this purpose. The   
Federal rules restrict any use of the information to criminally investigate or   
prosecute any alcohol or drug abuse patient.[facility]  
  
  
  
  
                                                    Reason for Visit (unrecogniz  
ed section and content)  
   
                                          
  
  
  
                                        Reason              Comments  
   
                                        Yearly Exam           
  
  
  
                                        Reason              Comments  
   
                                        DUB                   
  
  
  
                          Specialty    Diagnoses / Procedures Referred By Contac  
t Referred To Contact  
   
                                        OB/GYN                
  
  
Diagnoses  
  
  
Encounter for follow-up  
  
  
READ  
  
  
  
Procedures  
  
  
OFFICE/OUTPATIENT ESTABLISHED   
HIGH MDM 40-54 MIN  
  
  
EST WHI PATIENT                           
  
  
Self                                      
  
  
Karely Comer MD  
  
  
[address] [place] [address]  
  
  
Phone: [phone]  
  
  
Fax: [phone]  
  
  
  
                Referral ID Status  Reason  Start Date Expiration Date Visits Re  
quested Visits   
Authorized  
   
                [id number] Closed          2022 1       1  
  
  
  
                                        Reason              Comments  
   
                                        Results               
   
                                        Orders                
  
  
  
                                        Reason              Comments  
   
                                        Radiology CT          
  
  
  
                          Specialty    Diagnoses / Procedures Referred By Contac  
t Referred To Contact  
   
                                                    Radiology / RADIO CT   
[facility]                             
  
  
Diagnoses  
  
  
*order in scan docs* pt   
says she will contact   
insurance for pre  
  
  
approval  
  
  
CT ABD/PEL W IVCON   
(possible hernia)  
  
  
K40.90 - Unilateral   
inguinal hernia,   
without obstruction or   
gangrene,  
  
  
not specified as   
recurrent  
  
  
Faye Valero MD  
  
  
  
Procedures  
  
  
CT ORAL PREP                              
  
  
Faye Valero MD  
  
  
[address]  
  
  
[place] [address]  
  
  
Phone: [phone]  
  
  
Fax: [phone]                         
  
  
[facility]   
[facility]  
  
  
[address]  
  
  
[place] [address]  
  
  
Phone: [phone]  
  
  
Fax: [phone]  
  
  
  
                Referral ID Status  Reason  Start Date Expiration Date Visits Re  
quested Visits   
Authorized  
   
                [id number] Closed          2024 3/21/2024 2       2  
  
  
  
                                        Reason              Comments  
   
                                        Yearly Exam           
  
  
  
                          Specialty    Diagnoses / Procedures Referred By Contac  
t Referred To Contact  
   
                                        Radiology             
  
  
Diagnoses  
  
  
Encounter for screening for   
nutritional disorder  
  
  
  
Procedures  
  
  
CT cardiac scoring wo IV   
contrast                                  
  
  
Ranney, Christopher Behr, MD  
  
  
[address]   
  
  
[address]  
  
  
[place] [address]  
  
  
Phone: [phone]  
  
  
Fax: [phone]                         
  
  
  
  
  
                          Referral ID  Status       Reason       Start   
Date                                    Expiration   
Date                                    Visits   
Requested                               Visits   
Authorized  
   
                                        [id number]             Pending   
Review                                    
  
  
Perform   
Procedure       2024       1               1  
  
  
  
  
  
                                                    Care Teams (unrecognized sec  
tion and content)  
   
                                          
  
  
  
                      Team Member Relationship Specialty  Start Date End Date  
   
                                                      
  
  
Jose Sloan MD  
  
  
[address] [facility] [place] [place] [id number] [phone] (Work)  
  
  
[phone] (Fax) PCP - General                   2/8/10            
  
  
  
                      Team Member Relationship Specialty  Start Date End Date  
   
                                                      
  
  
Jose Sloan MD  
  
  
[address]  
  
  
[place], [place] [id number] [phone] (Work)  
  
  
[phone] (Fax) PCP - General                   2/8/10            
  
  
  
                      Team Member Relationship Specialty  Start Date End Date  
   
                                                      
  
  
Jose Sloan MD  
  
  
[address]  
  
  
[place] [address] [phone] (Work)  
  
  
[phone] (Fax) PCP - General                   2/8/10            
  
  
  
                      Team Member Relationship Specialty  Start Date End Date  
   
                                                      
  
  
Jose Sloan MD  
  
  
[address] [place] [address] [phone] (Work)  
  
  
[phone] (Fax) PCP - General                   2/8/10            
  
  
  
                                                    Team Status: Active   
   
                          Member       Role         Status       Dates  
   
                          Dr. Krystian Sloan MD Family Provider Active       
    
   
                          Dr. Krystian Sloan MD Primary Care Provider Activ  
e         
  
  
  
                                                    Team Status: Active   
   
                          Member       Role         Status       Dates  
   
                          Dr. Krystian Sloan MD Primary Care Provider Activ  
e         
   
                          Carolina Sunshine   Attending Provider Active         
  
  
  
                                                    Team Status: Inactive   
   
                          Member       Role         Status       Dates  
   
                          Dr. Krystian Sloan MD Primary Care Provider, Refe  
rring Provider Active         
   
                          Dr. Faye Valero MD Attending Provider Active       
    
  
  
  
                                                    Team Status: Active   
   
                          Member       Role         Status       Dates  
   
                          Dr. Krystian Sloan MD Primary Care Provider Activ  
e         
   
                          Yusra Maria Attending Provider Active         
  
  
  
                                                    Team Status: Active   
   
                          Member       Role         Status       Dates  
   
                          Dr. Krystian Sloan MD Primary Care Provider, Refe  
rring Provider Active         
   
                          Dr. Faye Valero MD Attending Provider, Other Prov  
ider Active         
  
  
  
                                                    Team Status: Inactive   
   
                          Member       Role         Status       Dates  
   
                          Dr. Krystian Sloan MD Primary Care Provider Activ  
e         
   
                          Dr. Faye Valero MD Attending Provider, Referring   
Provider Active         
  
  
  
                      Team Member Relationship Specialty  Start Date End Date  
   
                                                      
  
  
Jose Sloan MD  
  
  
[address] [place] [address]  
  
  
[phone] (Work)  
  
  
[phone] (Fax) PCP - General                   2/8/10            
  
  
  
                                                    Team Status: Inactive   
   
                          Member       Role         Status       Dates  
   
                          Dr. Krystian Slaon MD Primary Care Provider, Refe  
rring Provider Active         
   
                          BENJAMIN LindsayC Attending Provider Active      
     
  
  
  
                      Team Member Relationship Specialty  Start Date End Date  
   
                                                      
  
  
Jose Sloan MD  
  
  
[address] [place] [id number] [phone] (Work)  
  
  
[phone] (Fax) PCP - General                   2/8/10            
  
  
  
                                                    Team Status: Inactive   
   
                          Member       Role         Status       Dates  
   
                          Dr. Krystian Sloan MD Primary Care Provider Activ  
e         
   
                          Dr. Faye Valero MD Attending Provider Active       
    
   
                          Doreen URIBE PA-C Referring Provider Active      
     
  
  
  
                      Team Member Relationship Specialty  Start Date End Date  
   
                                                      
  
  
Jose Sloan MD  
  
  
NPI: [id number]  
  
  
[address]  
  
  
[place] [address]  
  
  
[phone] (Work)  
  
  
[phone] (Fax) PCP - General                   2/8/10            
  
  
  
                      Team Member Relationship Specialty  Start Date End Date  
   
                                                      
  
  
Jose Sloan MD  
  
  
NPI: [id number]  
  
  
[address]  
  
  
[place] [address]  
  
  
[phone] (Work)  
  
  
[phone] (Fax) PCP - General                   2/8/10            
  
  
  
                      Team Member Relationship Specialty  Start Date End Date  
   
                                                      
  
  
Jose Sloan MD  
  
  
NPI: [id number]  
  
  
[address]  
  
  
[place] [address]  
  
  
[phone] (Work)  
  
  
[phone] (Fax) PCP - General                   2/8/10            
  
  
  
                      Team Member Relationship Specialty  Start Date End Date  
   
                                                      
  
  
Jose Sloan MD  
  
  
NPI: [id number]  
  
  
[address]  
  
  
[place] [address]  
  
  
[phone] (Work)  
  
  
[phone] (Fax) PCP - General                   2/8/10            
  
  
  
                      Team Member Relationship Specialty  Start Date End Date  
   
                                                      
  
  
Jose Sloan MD  
  
  
NPI: [id number]  
  
  
[address]  
  
  
[place] [address]  
  
  
[phone] (Work)  
  
  
[phone] (Fax) PCP - General                   2/8/10            
  
  
  
                      Team Member Relationship Specialty  Start Date End Date  
   
                                                      
  
  
Jose Sloan MD  
  
  
NPI: [id number]  
  
  
[address]  
  
  
[place] [address]  
  
  
[phone] (Work)  
  
  
[phone] (Fax) PCP - General                   2/8/10            
  
  
  
                                                    Team Status: Active   
   
                          Member       Role         Status       Dates  
   
                          Dr. Jose Sloan MD Family Provider Active      
     
   
                          Dr. Jose Sloan MD Primary Care Provider Acti  
ve         
  
  
  
                                                    Team Status: Inactive   
   
                          Member       Role         Status       Dates  
   
                          Dr. Jose Sloan MD Primary Care Provider, Ref  
erring Provider Active         
   
                          Dr. Faye Valero MD Attending Provider Active       
    
  
  
  
                                                    Team Status: Inactive   
   
                          Member       Role         Status       Dates  
   
                          Dr. Jose Sloan MD Primary Care Provider, Ref  
erring Provider Active         
   
                          Doreen URIBE, PA-C Attending Provider Active      
     
  
  
  
                      Team Member Relationship Specialty  Start Date End Date  
   
                                                      
  
  
Ranney, Christopher Behr, MD  
  
  
NPI: [id number]  
  
  
[address]  
  
  
[address]  
  
  
[place] [address]  
  
  
[phone] (Work)  
  
  
[phone] (Fax) PCP - General   Family Medicine 8/10/24           
  
  
FOR RECORDS PERTAINING TO PATIENTS WHO ARE OR HAVE BEEN ENROLLED IN A CHEMICAL 
DEPENDENCY/SUBSTANCEABUSE PROGRAM, SOME INFORMATION MAY BE OMITTED. This 
clinical summary was aggregated from multiple sources. Caution should be 
exercised in using it in the provision of clinical care. This summary normalizes
information from multiple sources, and as a consequence, information in this 
document may materially change the coding, format and clinical context of 
patient data. In addition, data may be omitted in some cases. CLINICAL DECISIONS
SHOULD BE BASED ON THE PRIMARY CLINICAL RECORDS. [employer] provides 
no warranty or guarantee of the accuracy or completeness of information in this 
document.

## 2025-02-10 NOTE — PC.NURSE
1302: time out completed as documented. pt positioned per anesthesia,2L O2 via nasal cannula applied per policy. pt placed on monitors.
1303: pt medicated by VAISHNAVI Broussard
1305: popliteal block began and completed at 1310,pt tolerated well without complaints.
1311: saphenous block began and completed at 1314,pt tolerated well without complaints.
pts vitals monitored throughout procedure and were WNL,pt will be monitored until taken back to OR. pts bed to lowest position with siderails up and call light within reach.

## 2025-03-24 ENCOUNTER — HOSPITAL ENCOUNTER (OUTPATIENT)
Dept: HOSPITAL 100 - CT | Age: 50
Discharge: HOME | End: 2025-03-24
Payer: COMMERCIAL

## 2025-03-24 DIAGNOSIS — Z87.19: ICD-10-CM

## 2025-03-24 DIAGNOSIS — Z98.890: ICD-10-CM

## 2025-03-24 DIAGNOSIS — R10.32: Primary | ICD-10-CM

## 2025-03-24 PROCEDURE — 74177 CT ABD & PELVIS W/CONTRAST: CPT
